# Patient Record
Sex: FEMALE | Race: WHITE | NOT HISPANIC OR LATINO
[De-identification: names, ages, dates, MRNs, and addresses within clinical notes are randomized per-mention and may not be internally consistent; named-entity substitution may affect disease eponyms.]

---

## 2017-05-22 ENCOUNTER — APPOINTMENT (OUTPATIENT)
Dept: SURGERY | Facility: CLINIC | Age: 82
End: 2017-05-22

## 2017-05-22 VITALS — HEIGHT: 64 IN | WEIGHT: 170 LBS | BODY MASS INDEX: 29.02 KG/M2

## 2017-05-22 RX ORDER — COLESEVELAM HYDROCHLORIDE 625 MG/1
625 TABLET, FILM COATED ORAL
Qty: 60 | Refills: 0 | Status: ACTIVE | COMMUNITY
Start: 2016-12-29

## 2017-06-20 ENCOUNTER — RECORD ABSTRACTING (OUTPATIENT)
Age: 82
End: 2017-06-20

## 2017-06-20 ENCOUNTER — APPOINTMENT (OUTPATIENT)
Dept: HEMATOLOGY ONCOLOGY | Facility: CLINIC | Age: 82
End: 2017-06-20

## 2017-06-20 VITALS — SYSTOLIC BLOOD PRESSURE: 144 MMHG | DIASTOLIC BLOOD PRESSURE: 70 MMHG | TEMPERATURE: 98 F | HEART RATE: 68 BPM

## 2017-06-21 ENCOUNTER — APPOINTMENT (OUTPATIENT)
Dept: SURGERY | Facility: CLINIC | Age: 82
End: 2017-06-21

## 2017-07-07 ENCOUNTER — APPOINTMENT (OUTPATIENT)
Dept: SURGERY | Facility: CLINIC | Age: 82
End: 2017-07-07

## 2017-08-08 ENCOUNTER — RX RENEWAL (OUTPATIENT)
Age: 82
End: 2017-08-08

## 2017-10-02 ENCOUNTER — APPOINTMENT (OUTPATIENT)
Dept: SURGERY | Facility: CLINIC | Age: 82
End: 2017-10-02
Payer: MEDICARE

## 2017-10-02 DIAGNOSIS — Z80.3 FAMILY HISTORY OF MALIGNANT NEOPLASM OF BREAST: ICD-10-CM

## 2017-10-02 PROCEDURE — 99214 OFFICE O/P EST MOD 30 MIN: CPT

## 2017-11-09 ENCOUNTER — RX RENEWAL (OUTPATIENT)
Age: 82
End: 2017-11-09

## 2017-12-12 ENCOUNTER — APPOINTMENT (OUTPATIENT)
Dept: HEMATOLOGY ONCOLOGY | Facility: CLINIC | Age: 82
End: 2017-12-12
Payer: MEDICARE

## 2017-12-12 VITALS
HEART RATE: 72 BPM | SYSTOLIC BLOOD PRESSURE: 154 MMHG | BODY MASS INDEX: 29.52 KG/M2 | WEIGHT: 172 LBS | DIASTOLIC BLOOD PRESSURE: 80 MMHG

## 2017-12-12 DIAGNOSIS — Z92.89 PERSONAL HISTORY OF OTHER MEDICAL TREATMENT: ICD-10-CM

## 2017-12-12 PROCEDURE — 99214 OFFICE O/P EST MOD 30 MIN: CPT

## 2018-03-18 ENCOUNTER — RX RENEWAL (OUTPATIENT)
Age: 83
End: 2018-03-18

## 2018-06-07 ENCOUNTER — RECORD ABSTRACTING (OUTPATIENT)
Age: 83
End: 2018-06-07

## 2018-06-07 DIAGNOSIS — Z92.241 PERSONAL HISTORY OF SYSTEMIC STEROID THERAPY: ICD-10-CM

## 2018-06-07 DIAGNOSIS — S90.00XA CONTUSION OF UNSPECIFIED ANKLE, INITIAL ENCOUNTER: ICD-10-CM

## 2018-06-07 DIAGNOSIS — Z83.3 FAMILY HISTORY OF DIABETES MELLITUS: ICD-10-CM

## 2018-06-11 ENCOUNTER — APPOINTMENT (OUTPATIENT)
Dept: ORTHOPEDIC SURGERY | Facility: CLINIC | Age: 83
End: 2018-06-11
Payer: MEDICARE

## 2018-06-11 VITALS — BODY MASS INDEX: 30.48 KG/M2 | WEIGHT: 172 LBS | HEIGHT: 63 IN

## 2018-06-11 PROCEDURE — 20610 DRAIN/INJ JOINT/BURSA W/O US: CPT | Mod: 50

## 2018-06-11 PROCEDURE — 99214 OFFICE O/P EST MOD 30 MIN: CPT | Mod: 25

## 2018-06-12 ENCOUNTER — APPOINTMENT (OUTPATIENT)
Dept: CT IMAGING | Facility: HOSPITAL | Age: 83
End: 2018-06-12
Payer: MEDICARE

## 2018-06-12 ENCOUNTER — OUTPATIENT (OUTPATIENT)
Dept: OUTPATIENT SERVICES | Facility: HOSPITAL | Age: 83
LOS: 1 days | End: 2018-06-12
Payer: MEDICARE

## 2018-06-12 DIAGNOSIS — Z00.8 ENCOUNTER FOR OTHER GENERAL EXAMINATION: ICD-10-CM

## 2018-06-12 PROCEDURE — 74176 CT ABD & PELVIS W/O CONTRAST: CPT

## 2018-06-12 PROCEDURE — 74176 CT ABD & PELVIS W/O CONTRAST: CPT | Mod: 26

## 2018-06-14 ENCOUNTER — APPOINTMENT (OUTPATIENT)
Dept: HEMATOLOGY ONCOLOGY | Facility: CLINIC | Age: 83
End: 2018-06-14
Payer: MEDICARE

## 2018-06-14 VITALS
HEART RATE: 64 BPM | WEIGHT: 162 LBS | TEMPERATURE: 99.4 F | SYSTOLIC BLOOD PRESSURE: 160 MMHG | DIASTOLIC BLOOD PRESSURE: 74 MMHG | BODY MASS INDEX: 28.7 KG/M2

## 2018-06-14 PROCEDURE — 99214 OFFICE O/P EST MOD 30 MIN: CPT

## 2018-06-14 RX ORDER — LATANOPROST/PF 0.005 %
0.01 DROPS OPHTHALMIC (EYE)
Qty: 25 | Refills: 0 | Status: ACTIVE | COMMUNITY
Start: 2017-12-29

## 2018-06-15 ENCOUNTER — RX RENEWAL (OUTPATIENT)
Age: 83
End: 2018-06-15

## 2018-06-18 DIAGNOSIS — N26.1 ATROPHY OF KIDNEY (TERMINAL): ICD-10-CM

## 2018-06-18 DIAGNOSIS — K80.20 CALCULUS OF GALLBLADDER WITHOUT CHOLECYSTITIS WITHOUT OBSTRUCTION: ICD-10-CM

## 2018-06-18 DIAGNOSIS — K57.30 DIVERTICULOSIS OF LARGE INTESTINE WITHOUT PERFORATION OR ABSCESS WITHOUT BLEEDING: ICD-10-CM

## 2018-06-18 DIAGNOSIS — N28.1 CYST OF KIDNEY, ACQUIRED: ICD-10-CM

## 2018-08-02 ENCOUNTER — APPOINTMENT (OUTPATIENT)
Dept: HEMATOLOGY ONCOLOGY | Facility: CLINIC | Age: 83
End: 2018-08-02
Payer: MEDICARE

## 2018-08-02 VITALS
DIASTOLIC BLOOD PRESSURE: 64 MMHG | SYSTOLIC BLOOD PRESSURE: 130 MMHG | WEIGHT: 160 LBS | TEMPERATURE: 99.1 F | RESPIRATION RATE: 16 BRPM | BODY MASS INDEX: 28.34 KG/M2 | HEART RATE: 72 BPM

## 2018-08-02 PROCEDURE — 99215 OFFICE O/P EST HI 40 MIN: CPT

## 2018-08-08 ENCOUNTER — APPOINTMENT (OUTPATIENT)
Dept: SURGERY | Facility: CLINIC | Age: 83
End: 2018-08-08
Payer: MEDICARE

## 2018-08-08 VITALS
HEIGHT: 63 IN | SYSTOLIC BLOOD PRESSURE: 162 MMHG | HEART RATE: 67 BPM | RESPIRATION RATE: 16 BRPM | BODY MASS INDEX: 29.23 KG/M2 | WEIGHT: 165 LBS | DIASTOLIC BLOOD PRESSURE: 78 MMHG | TEMPERATURE: 98.1 F | OXYGEN SATURATION: 98 %

## 2018-08-08 DIAGNOSIS — Z85.828 PERSONAL HISTORY OF OTHER MALIGNANT NEOPLASM OF SKIN: ICD-10-CM

## 2018-08-08 DIAGNOSIS — Z86.69 PERSONAL HISTORY OF OTHER DISEASES OF THE NERVOUS SYSTEM AND SENSE ORGANS: ICD-10-CM

## 2018-08-08 PROCEDURE — 99214 OFFICE O/P EST MOD 30 MIN: CPT

## 2018-08-08 RX ORDER — CLOTRIMAZOLE AND BETAMETHASONE DIPROPIONATE 10; .5 MG/G; MG/G
1-0.05 CREAM TOPICAL
Qty: 45 | Refills: 0 | Status: DISCONTINUED | COMMUNITY
Start: 2016-12-27 | End: 2018-08-08

## 2018-08-08 RX ORDER — ATORVASTATIN CALCIUM 80 MG/1
TABLET, FILM COATED ORAL
Refills: 0 | Status: DISCONTINUED | COMMUNITY
End: 2018-08-08

## 2018-08-08 RX ORDER — NYSTATIN 100000 U/G
100000 OINTMENT TOPICAL
Qty: 15 | Refills: 0 | Status: DISCONTINUED | COMMUNITY
Start: 2018-05-17 | End: 2018-08-08

## 2018-08-08 RX ORDER — TRIAMCINOLONE ACETONIDE 1 MG/G
0.1 OINTMENT TOPICAL
Qty: 15 | Refills: 0 | Status: DISCONTINUED | COMMUNITY
Start: 2018-05-17 | End: 2018-08-08

## 2018-08-08 RX ORDER — UBIDECARENONE/VIT E ACET 100MG-5
50 MCG CAPSULE ORAL
Refills: 0 | Status: DISCONTINUED | COMMUNITY
End: 2018-08-08

## 2018-08-08 RX ORDER — TRIAMCINOLONE ACETONIDE 0.25 MG/G
0.03 OINTMENT TOPICAL
Qty: 80 | Refills: 0 | Status: DISCONTINUED | COMMUNITY
Start: 2017-08-08 | End: 2018-08-08

## 2018-08-17 ENCOUNTER — RESULT REVIEW (OUTPATIENT)
Age: 83
End: 2018-08-17

## 2018-09-06 ENCOUNTER — OUTPATIENT (OUTPATIENT)
Dept: OUTPATIENT SERVICES | Facility: HOSPITAL | Age: 83
LOS: 1 days | End: 2018-09-06
Payer: MEDICARE

## 2018-09-06 VITALS
OXYGEN SATURATION: 98 % | SYSTOLIC BLOOD PRESSURE: 156 MMHG | DIASTOLIC BLOOD PRESSURE: 66 MMHG | RESPIRATION RATE: 18 BRPM | TEMPERATURE: 99 F | WEIGHT: 160.06 LBS | HEIGHT: 62 IN | HEART RATE: 63 BPM

## 2018-09-06 DIAGNOSIS — Z98.890 OTHER SPECIFIED POSTPROCEDURAL STATES: Chronic | ICD-10-CM

## 2018-09-06 DIAGNOSIS — C19 MALIGNANT NEOPLASM OF RECTOSIGMOID JUNCTION: ICD-10-CM

## 2018-09-06 DIAGNOSIS — Z90.89 ACQUIRED ABSENCE OF OTHER ORGANS: Chronic | ICD-10-CM

## 2018-09-06 DIAGNOSIS — Z01.818 ENCOUNTER FOR OTHER PREPROCEDURAL EXAMINATION: ICD-10-CM

## 2018-09-06 DIAGNOSIS — I82.409 ACUTE EMBOLISM AND THROMBOSIS OF UNSPECIFIED DEEP VEINS OF UNSPECIFIED LOWER EXTREMITY: ICD-10-CM

## 2018-09-06 DIAGNOSIS — Z98.49 CATARACT EXTRACTION STATUS, UNSPECIFIED EYE: Chronic | ICD-10-CM

## 2018-09-06 LAB
ANION GAP SERPL CALC-SCNC: 8 MMOL/L — SIGNIFICANT CHANGE UP (ref 5–17)
BLD GP AB SCN SERPL QL: NEGATIVE — SIGNIFICANT CHANGE UP
BUN SERPL-MCNC: 16 MG/DL — SIGNIFICANT CHANGE UP (ref 7–23)
CALCIUM SERPL-MCNC: 7.9 MG/DL — LOW (ref 8.4–10.5)
CHLORIDE SERPL-SCNC: 107 MMOL/L — SIGNIFICANT CHANGE UP (ref 96–108)
CO2 SERPL-SCNC: 23 MMOL/L — SIGNIFICANT CHANGE UP (ref 22–31)
CREAT SERPL-MCNC: 1.24 MG/DL — SIGNIFICANT CHANGE UP (ref 0.5–1.3)
GLUCOSE SERPL-MCNC: 141 MG/DL — HIGH (ref 70–99)
HCT VFR BLD CALC: 32.1 % — LOW (ref 34.5–45)
HGB BLD-MCNC: 9.8 G/DL — LOW (ref 11.5–15.5)
MCHC RBC-ENTMCNC: 25.3 PG — LOW (ref 27–34)
MCHC RBC-ENTMCNC: 30.5 GM/DL — LOW (ref 32–36)
MCV RBC AUTO: 82.9 FL — SIGNIFICANT CHANGE UP (ref 80–100)
PLATELET # BLD AUTO: 384 K/UL — SIGNIFICANT CHANGE UP (ref 150–400)
POTASSIUM SERPL-MCNC: 3.5 MMOL/L — SIGNIFICANT CHANGE UP (ref 3.5–5.3)
POTASSIUM SERPL-SCNC: 3.5 MMOL/L — SIGNIFICANT CHANGE UP (ref 3.5–5.3)
RBC # BLD: 3.87 M/UL — SIGNIFICANT CHANGE UP (ref 3.8–5.2)
RBC # FLD: 16 % — HIGH (ref 10.3–14.5)
RH IG SCN BLD-IMP: POSITIVE — SIGNIFICANT CHANGE UP
SODIUM SERPL-SCNC: 138 MMOL/L — SIGNIFICANT CHANGE UP (ref 135–145)
WBC # BLD: 7.08 K/UL — SIGNIFICANT CHANGE UP (ref 3.8–10.5)
WBC # FLD AUTO: 7.08 K/UL — SIGNIFICANT CHANGE UP (ref 3.8–10.5)

## 2018-09-06 PROCEDURE — 85027 COMPLETE CBC AUTOMATED: CPT

## 2018-09-06 PROCEDURE — 80048 BASIC METABOLIC PNL TOTAL CA: CPT

## 2018-09-06 PROCEDURE — 86850 RBC ANTIBODY SCREEN: CPT

## 2018-09-06 PROCEDURE — 86901 BLOOD TYPING SEROLOGIC RH(D): CPT

## 2018-09-06 PROCEDURE — 86900 BLOOD TYPING SEROLOGIC ABO: CPT

## 2018-09-06 PROCEDURE — G0463: CPT

## 2018-09-06 NOTE — H&P PST ADULT - NSANTHOSAYNRD_GEN_A_CORE
No. HODAN screening performed.  STOP BANG Legend: 0-2 = LOW Risk; 3-4 = INTERMEDIATE Risk; 5-8 = HIGH Risk

## 2018-09-06 NOTE — H&P PST ADULT - PMH
<<-----Click on this checkbox to enter Past Medical History Basal cell carcinoma    Breast CA    DVT (deep venous thrombosis)  left leg  HLD (hyperlipidemia)    HTN (hypertension)    Macular degeneration

## 2018-09-06 NOTE — H&P PST ADULT - LAST ECHOCARDIOGRAM
going for pre-surgical evaluation next week going for pre-surgical evaluation next week with cardiologist

## 2018-09-06 NOTE — H&P PST ADULT - HISTORY OF PRESENT ILLNESS
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  Presents today for ERP, laparoscopic sigmoid colectomy.

## 2018-09-11 ENCOUNTER — APPOINTMENT (OUTPATIENT)
Dept: ORTHOPEDIC SURGERY | Facility: CLINIC | Age: 83
End: 2018-09-11
Payer: MEDICARE

## 2018-09-11 PROBLEM — I82.409 ACUTE EMBOLISM AND THROMBOSIS OF UNSPECIFIED DEEP VEINS OF UNSPECIFIED LOWER EXTREMITY: Chronic | Status: ACTIVE | Noted: 2018-09-06

## 2018-09-11 PROBLEM — H35.30 UNSPECIFIED MACULAR DEGENERATION: Chronic | Status: ACTIVE | Noted: 2018-09-06

## 2018-09-11 PROBLEM — I10 ESSENTIAL (PRIMARY) HYPERTENSION: Chronic | Status: ACTIVE | Noted: 2018-09-06

## 2018-09-11 PROBLEM — E78.5 HYPERLIPIDEMIA, UNSPECIFIED: Chronic | Status: ACTIVE | Noted: 2018-09-06

## 2018-09-11 PROBLEM — C50.919 MALIGNANT NEOPLASM OF UNSPECIFIED SITE OF UNSPECIFIED FEMALE BREAST: Chronic | Status: ACTIVE | Noted: 2018-09-06

## 2018-09-11 PROBLEM — C44.91 BASAL CELL CARCINOMA OF SKIN, UNSPECIFIED: Chronic | Status: ACTIVE | Noted: 2018-09-06

## 2018-09-11 PROCEDURE — 20610 DRAIN/INJ JOINT/BURSA W/O US: CPT | Mod: 50

## 2018-09-11 PROCEDURE — 99214 OFFICE O/P EST MOD 30 MIN: CPT | Mod: 25

## 2018-09-13 ENCOUNTER — NON-APPOINTMENT (OUTPATIENT)
Age: 83
End: 2018-09-13

## 2018-09-13 ENCOUNTER — APPOINTMENT (OUTPATIENT)
Dept: CARDIOLOGY | Facility: CLINIC | Age: 83
End: 2018-09-13
Payer: MEDICARE

## 2018-09-13 ENCOUNTER — APPOINTMENT (OUTPATIENT)
Dept: CARDIOLOGY | Facility: CLINIC | Age: 83
End: 2018-09-13

## 2018-09-13 VITALS
HEART RATE: 62 BPM | DIASTOLIC BLOOD PRESSURE: 61 MMHG | OXYGEN SATURATION: 97 % | WEIGHT: 165 LBS | SYSTOLIC BLOOD PRESSURE: 169 MMHG | BODY MASS INDEX: 29.23 KG/M2 | HEIGHT: 63 IN

## 2018-09-13 VITALS — HEART RATE: 60 BPM | DIASTOLIC BLOOD PRESSURE: 60 MMHG | SYSTOLIC BLOOD PRESSURE: 130 MMHG

## 2018-09-13 DIAGNOSIS — E78.00 PURE HYPERCHOLESTEROLEMIA, UNSPECIFIED: ICD-10-CM

## 2018-09-13 DIAGNOSIS — K62.5 HEMORRHAGE OF ANUS AND RECTUM: ICD-10-CM

## 2018-09-13 DIAGNOSIS — I82.402 ACUTE EMBOLISM AND THROMBOSIS OF UNSPECIFIED DEEP VEINS OF LEFT LOWER EXTREMITY: ICD-10-CM

## 2018-09-13 DIAGNOSIS — I10 ESSENTIAL (PRIMARY) HYPERTENSION: ICD-10-CM

## 2018-09-13 PROCEDURE — 93000 ELECTROCARDIOGRAM COMPLETE: CPT

## 2018-09-13 PROCEDURE — 93306 TTE W/DOPPLER COMPLETE: CPT

## 2018-09-13 PROCEDURE — 99205 OFFICE O/P NEW HI 60 MIN: CPT

## 2018-09-14 PROBLEM — I35.0 AORTIC STENOSIS: Status: ACTIVE | Noted: 2018-09-14

## 2018-09-19 ENCOUNTER — APPOINTMENT (OUTPATIENT)
Dept: SURGERY | Facility: HOSPITAL | Age: 83
End: 2018-09-19
Payer: MEDICARE

## 2018-09-19 ENCOUNTER — RESULT REVIEW (OUTPATIENT)
Age: 83
End: 2018-09-19

## 2018-09-19 ENCOUNTER — TRANSCRIPTION ENCOUNTER (OUTPATIENT)
Age: 83
End: 2018-09-19

## 2018-09-19 ENCOUNTER — INPATIENT (INPATIENT)
Facility: HOSPITAL | Age: 83
LOS: 4 days | Discharge: NURSING, REHAB OR LONG-TERM CARE | DRG: 331 | End: 2018-09-24
Attending: COLON & RECTAL SURGERY | Admitting: COLON & RECTAL SURGERY
Payer: MEDICARE

## 2018-09-19 VITALS
HEART RATE: 60 BPM | OXYGEN SATURATION: 100 % | RESPIRATION RATE: 18 BRPM | TEMPERATURE: 98 F | DIASTOLIC BLOOD PRESSURE: 74 MMHG | SYSTOLIC BLOOD PRESSURE: 157 MMHG

## 2018-09-19 DIAGNOSIS — C19 MALIGNANT NEOPLASM OF RECTOSIGMOID JUNCTION: ICD-10-CM

## 2018-09-19 DIAGNOSIS — Z98.49 CATARACT EXTRACTION STATUS, UNSPECIFIED EYE: Chronic | ICD-10-CM

## 2018-09-19 DIAGNOSIS — Z98.890 OTHER SPECIFIED POSTPROCEDURAL STATES: Chronic | ICD-10-CM

## 2018-09-19 DIAGNOSIS — Z90.89 ACQUIRED ABSENCE OF OTHER ORGANS: Chronic | ICD-10-CM

## 2018-09-19 LAB
ANION GAP SERPL CALC-SCNC: 10 MMOL/L — SIGNIFICANT CHANGE UP (ref 5–17)
ANION GAP SERPL CALC-SCNC: 9 MMOL/L — SIGNIFICANT CHANGE UP (ref 5–17)
APTT BLD: 23.5 SEC — LOW (ref 27.5–37.4)
APTT BLD: 23.6 SEC — LOW (ref 27.5–37.4)
BUN SERPL-MCNC: 20 MG/DL — SIGNIFICANT CHANGE UP (ref 7–23)
BUN SERPL-MCNC: 21 MG/DL — SIGNIFICANT CHANGE UP (ref 7–23)
CALCIUM SERPL-MCNC: 9.4 MG/DL — SIGNIFICANT CHANGE UP (ref 8.4–10.5)
CALCIUM SERPL-MCNC: 9.7 MG/DL — SIGNIFICANT CHANGE UP (ref 8.4–10.5)
CHLORIDE SERPL-SCNC: 97 MMOL/L — SIGNIFICANT CHANGE UP (ref 96–108)
CHLORIDE SERPL-SCNC: 98 MMOL/L — SIGNIFICANT CHANGE UP (ref 96–108)
CO2 SERPL-SCNC: 33 MMOL/L — HIGH (ref 22–31)
CO2 SERPL-SCNC: 33 MMOL/L — HIGH (ref 22–31)
CREAT SERPL-MCNC: 1.26 MG/DL — SIGNIFICANT CHANGE UP (ref 0.5–1.3)
CREAT SERPL-MCNC: 1.26 MG/DL — SIGNIFICANT CHANGE UP (ref 0.5–1.3)
GLUCOSE SERPL-MCNC: 198 MG/DL — HIGH (ref 70–99)
GLUCOSE SERPL-MCNC: 202 MG/DL — HIGH (ref 70–99)
HCT VFR BLD CALC: 31.7 % — LOW (ref 34.5–45)
HGB BLD-MCNC: 10.2 G/DL — LOW (ref 11.5–15.5)
INR BLD: 1.38 RATIO — HIGH (ref 0.88–1.16)
INR BLD: 1.39 RATIO — HIGH (ref 0.88–1.16)
MAGNESIUM SERPL-MCNC: 2.7 MG/DL — HIGH (ref 1.6–2.6)
MAGNESIUM SERPL-MCNC: 2.8 MG/DL — HIGH (ref 1.6–2.6)
MCHC RBC-ENTMCNC: 26 PG — LOW (ref 27–34)
MCHC RBC-ENTMCNC: 32.1 GM/DL — SIGNIFICANT CHANGE UP (ref 32–36)
MCV RBC AUTO: 81.2 FL — SIGNIFICANT CHANGE UP (ref 80–100)
PHOSPHATE SERPL-MCNC: 2.1 MG/DL — LOW (ref 2.5–4.5)
PHOSPHATE SERPL-MCNC: 2.1 MG/DL — LOW (ref 2.5–4.5)
PLATELET # BLD AUTO: 398 K/UL — SIGNIFICANT CHANGE UP (ref 150–400)
POTASSIUM SERPL-MCNC: 4.2 MMOL/L — SIGNIFICANT CHANGE UP (ref 3.5–5.3)
POTASSIUM SERPL-MCNC: 4.3 MMOL/L — SIGNIFICANT CHANGE UP (ref 3.5–5.3)
POTASSIUM SERPL-SCNC: 4.2 MMOL/L — SIGNIFICANT CHANGE UP (ref 3.5–5.3)
POTASSIUM SERPL-SCNC: 4.3 MMOL/L — SIGNIFICANT CHANGE UP (ref 3.5–5.3)
PROTHROM AB SERPL-ACNC: 15 SEC — HIGH (ref 9.8–12.7)
PROTHROM AB SERPL-ACNC: 15.1 SEC — HIGH (ref 9.8–12.7)
RBC # BLD: 3.9 M/UL — SIGNIFICANT CHANGE UP (ref 3.8–5.2)
RBC # FLD: 14.4 % — SIGNIFICANT CHANGE UP (ref 10.3–14.5)
RH IG SCN BLD-IMP: POSITIVE — SIGNIFICANT CHANGE UP
SODIUM SERPL-SCNC: 140 MMOL/L — SIGNIFICANT CHANGE UP (ref 135–145)
SODIUM SERPL-SCNC: 140 MMOL/L — SIGNIFICANT CHANGE UP (ref 135–145)
WBC # BLD: 15.1 K/UL — HIGH (ref 3.8–10.5)
WBC # FLD AUTO: 15.1 K/UL — HIGH (ref 3.8–10.5)

## 2018-09-19 PROCEDURE — 45378 DIAGNOSTIC COLONOSCOPY: CPT

## 2018-09-19 PROCEDURE — 88305 TISSUE EXAM BY PATHOLOGIST: CPT | Mod: 26

## 2018-09-19 RX ORDER — CIPROFLOXACIN LACTATE 400MG/40ML
250 VIAL (ML) INTRAVENOUS ONCE
Qty: 0 | Refills: 0 | Status: COMPLETED | OUTPATIENT
Start: 2018-09-19 | End: 2018-09-19

## 2018-09-19 RX ORDER — METRONIDAZOLE 500 MG
250 TABLET ORAL ONCE
Qty: 0 | Refills: 0 | Status: COMPLETED | OUTPATIENT
Start: 2018-09-19 | End: 2018-09-19

## 2018-09-19 RX ORDER — INFLUENZA VIRUS VACCINE 15; 15; 15; 15 UG/.5ML; UG/.5ML; UG/.5ML; UG/.5ML
0.5 SUSPENSION INTRAMUSCULAR ONCE
Qty: 0 | Refills: 0 | Status: DISCONTINUED | OUTPATIENT
Start: 2018-09-19 | End: 2018-09-24

## 2018-09-19 RX ORDER — HEPARIN SODIUM 5000 [USP'U]/ML
5000 INJECTION INTRAVENOUS; SUBCUTANEOUS EVERY 8 HOURS
Qty: 0 | Refills: 0 | Status: DISCONTINUED | OUTPATIENT
Start: 2018-09-19 | End: 2018-09-20

## 2018-09-19 RX ORDER — ATENOLOL 25 MG/1
50 TABLET ORAL DAILY
Qty: 0 | Refills: 0 | Status: DISCONTINUED | OUTPATIENT
Start: 2018-09-19 | End: 2018-09-20

## 2018-09-19 RX ORDER — LATANOPROST 0.05 MG/ML
1 SOLUTION/ DROPS OPHTHALMIC; TOPICAL AT BEDTIME
Qty: 0 | Refills: 0 | Status: DISCONTINUED | OUTPATIENT
Start: 2018-09-19 | End: 2018-09-20

## 2018-09-19 RX ORDER — EXEMESTANE 25 MG/1
25 TABLET, SUGAR COATED ORAL DAILY
Qty: 0 | Refills: 0 | Status: DISCONTINUED | OUTPATIENT
Start: 2018-09-19 | End: 2018-09-20

## 2018-09-19 RX ORDER — COLESEVELAM HYDROCHLORIDE 625 MG/1
1875 TABLET, FILM COATED ORAL
Qty: 0 | Refills: 0 | Status: DISCONTINUED | OUTPATIENT
Start: 2018-09-19 | End: 2018-09-20

## 2018-09-19 RX ADMIN — HEPARIN SODIUM 5000 UNIT(S): 5000 INJECTION INTRAVENOUS; SUBCUTANEOUS at 22:55

## 2018-09-19 RX ADMIN — EXEMESTANE 25 MILLIGRAM(S): 25 TABLET, SUGAR COATED ORAL at 11:40

## 2018-09-19 RX ADMIN — Medication 250 MILLIGRAM(S): at 11:40

## 2018-09-19 RX ADMIN — COLESEVELAM HYDROCHLORIDE 1875 MILLIGRAM(S): 625 TABLET, FILM COATED ORAL at 17:33

## 2018-09-19 RX ADMIN — Medication 250 MILLIGRAM(S): at 16:17

## 2018-09-19 RX ADMIN — Medication 250 MILLIGRAM(S): at 23:34

## 2018-09-19 RX ADMIN — HEPARIN SODIUM 5000 UNIT(S): 5000 INJECTION INTRAVENOUS; SUBCUTANEOUS at 14:50

## 2018-09-19 RX ADMIN — LATANOPROST 1 DROP(S): 0.05 SOLUTION/ DROPS OPHTHALMIC; TOPICAL at 22:55

## 2018-09-20 ENCOUNTER — RESULT REVIEW (OUTPATIENT)
Age: 83
End: 2018-09-20

## 2018-09-20 ENCOUNTER — APPOINTMENT (OUTPATIENT)
Dept: SURGERY | Facility: HOSPITAL | Age: 83
End: 2018-09-20
Payer: MEDICARE

## 2018-09-20 DIAGNOSIS — I35.0 NONRHEUMATIC AORTIC (VALVE) STENOSIS: ICD-10-CM

## 2018-09-20 LAB
ANION GAP SERPL CALC-SCNC: 13 MMOL/L — SIGNIFICANT CHANGE UP (ref 5–17)
APTT BLD: 24.8 SEC — LOW (ref 27.5–37.4)
BUN SERPL-MCNC: 16 MG/DL — SIGNIFICANT CHANGE UP (ref 7–23)
CALCIUM SERPL-MCNC: 8.6 MG/DL — SIGNIFICANT CHANGE UP (ref 8.4–10.5)
CHLORIDE SERPL-SCNC: 99 MMOL/L — SIGNIFICANT CHANGE UP (ref 96–108)
CO2 SERPL-SCNC: 25 MMOL/L — SIGNIFICANT CHANGE UP (ref 22–31)
CREAT SERPL-MCNC: 1.14 MG/DL — SIGNIFICANT CHANGE UP (ref 0.5–1.3)
GLUCOSE SERPL-MCNC: 189 MG/DL — HIGH (ref 70–99)
HCT VFR BLD CALC: 31.8 % — LOW (ref 34.5–45)
HCT VFR BLD CALC: 32.7 % — LOW (ref 34.5–45)
HGB BLD-MCNC: 9.8 G/DL — LOW (ref 11.5–15.5)
HGB BLD-MCNC: 9.9 G/DL — LOW (ref 11.5–15.5)
INR BLD: 1.36 RATIO — HIGH (ref 0.88–1.16)
MCHC RBC-ENTMCNC: 24.7 PG — LOW (ref 27–34)
MCHC RBC-ENTMCNC: 25.1 PG — LOW (ref 27–34)
MCHC RBC-ENTMCNC: 30.3 GM/DL — LOW (ref 32–36)
MCHC RBC-ENTMCNC: 30.8 GM/DL — LOW (ref 32–36)
MCV RBC AUTO: 81.5 FL — SIGNIFICANT CHANGE UP (ref 80–100)
MCV RBC AUTO: 81.6 FL — SIGNIFICANT CHANGE UP (ref 80–100)
PLATELET # BLD AUTO: 383 K/UL — SIGNIFICANT CHANGE UP (ref 150–400)
PLATELET # BLD AUTO: 393 K/UL — SIGNIFICANT CHANGE UP (ref 150–400)
POTASSIUM SERPL-MCNC: 3.6 MMOL/L — SIGNIFICANT CHANGE UP (ref 3.5–5.3)
POTASSIUM SERPL-SCNC: 3.6 MMOL/L — SIGNIFICANT CHANGE UP (ref 3.5–5.3)
PROTHROM AB SERPL-ACNC: 14.8 SEC — HIGH (ref 9.8–12.7)
RBC # BLD: 3.9 M/UL — SIGNIFICANT CHANGE UP (ref 3.8–5.2)
RBC # BLD: 4.01 M/UL — SIGNIFICANT CHANGE UP (ref 3.8–5.2)
RBC # FLD: 14.1 % — SIGNIFICANT CHANGE UP (ref 10.3–14.5)
RBC # FLD: 14.1 % — SIGNIFICANT CHANGE UP (ref 10.3–14.5)
SODIUM SERPL-SCNC: 137 MMOL/L — SIGNIFICANT CHANGE UP (ref 135–145)
SURGICAL PATHOLOGY STUDY: SIGNIFICANT CHANGE UP
WBC # BLD: 10.1 K/UL — SIGNIFICANT CHANGE UP (ref 3.8–10.5)
WBC # BLD: 16.9 K/UL — HIGH (ref 3.8–10.5)
WBC # FLD AUTO: 10.1 K/UL — SIGNIFICANT CHANGE UP (ref 3.8–10.5)
WBC # FLD AUTO: 16.9 K/UL — HIGH (ref 3.8–10.5)

## 2018-09-20 PROCEDURE — 88309 TISSUE EXAM BY PATHOLOGIST: CPT | Mod: 26

## 2018-09-20 PROCEDURE — 88341 IMHCHEM/IMCYTCHM EA ADD ANTB: CPT | Mod: 26

## 2018-09-20 PROCEDURE — 44213 LAP MOBIL SPLENIC FL ADD-ON: CPT

## 2018-09-20 PROCEDURE — 44207 L COLECTOMY/COLOPROCTOSTOMY: CPT

## 2018-09-20 PROCEDURE — 88305 TISSUE EXAM BY PATHOLOGIST: CPT | Mod: 26

## 2018-09-20 PROCEDURE — 88342 IMHCHEM/IMCYTCHM 1ST ANTB: CPT | Mod: 26

## 2018-09-20 RX ORDER — ACETAMINOPHEN 500 MG
1000 TABLET ORAL ONCE
Qty: 0 | Refills: 0 | Status: COMPLETED | OUTPATIENT
Start: 2018-09-20 | End: 2018-09-20

## 2018-09-20 RX ORDER — ACETAMINOPHEN 500 MG
1000 TABLET ORAL ONCE
Qty: 0 | Refills: 0 | Status: COMPLETED | OUTPATIENT
Start: 2018-09-21 | End: 2018-09-21

## 2018-09-20 RX ORDER — HYDROMORPHONE HYDROCHLORIDE 2 MG/ML
0.1 INJECTION INTRAMUSCULAR; INTRAVENOUS; SUBCUTANEOUS
Qty: 0 | Refills: 0 | Status: DISCONTINUED | OUTPATIENT
Start: 2018-09-20 | End: 2018-09-20

## 2018-09-20 RX ORDER — HEPARIN SODIUM 5000 [USP'U]/ML
5000 INJECTION INTRAVENOUS; SUBCUTANEOUS EVERY 8 HOURS
Qty: 0 | Refills: 0 | Status: DISCONTINUED | OUTPATIENT
Start: 2018-09-20 | End: 2018-09-24

## 2018-09-20 RX ORDER — DEXTROSE MONOHYDRATE, SODIUM CHLORIDE, AND POTASSIUM CHLORIDE 50; .745; 4.5 G/1000ML; G/1000ML; G/1000ML
1000 INJECTION, SOLUTION INTRAVENOUS
Qty: 0 | Refills: 0 | Status: DISCONTINUED | OUTPATIENT
Start: 2018-09-20 | End: 2018-09-20

## 2018-09-20 RX ORDER — METOPROLOL TARTRATE 50 MG
2.5 TABLET ORAL EVERY 6 HOURS
Qty: 0 | Refills: 0 | Status: DISCONTINUED | OUTPATIENT
Start: 2018-09-20 | End: 2018-09-22

## 2018-09-20 RX ORDER — SODIUM CHLORIDE 9 MG/ML
1000 INJECTION, SOLUTION INTRAVENOUS
Qty: 0 | Refills: 0 | Status: DISCONTINUED | OUTPATIENT
Start: 2018-09-20 | End: 2018-09-21

## 2018-09-20 RX ORDER — HYDROMORPHONE HYDROCHLORIDE 2 MG/ML
0.2 INJECTION INTRAMUSCULAR; INTRAVENOUS; SUBCUTANEOUS
Qty: 0 | Refills: 0 | Status: DISCONTINUED | OUTPATIENT
Start: 2018-09-20 | End: 2018-09-20

## 2018-09-20 RX ORDER — ONDANSETRON 8 MG/1
4 TABLET, FILM COATED ORAL ONCE
Qty: 0 | Refills: 0 | Status: DISCONTINUED | OUTPATIENT
Start: 2018-09-20 | End: 2018-09-20

## 2018-09-20 RX ORDER — SODIUM CHLORIDE 9 MG/ML
1000 INJECTION, SOLUTION INTRAVENOUS
Qty: 0 | Refills: 0 | Status: DISCONTINUED | OUTPATIENT
Start: 2018-09-20 | End: 2018-09-20

## 2018-09-20 RX ADMIN — HYDROMORPHONE HYDROCHLORIDE 0.2 MILLIGRAM(S): 2 INJECTION INTRAMUSCULAR; INTRAVENOUS; SUBCUTANEOUS at 13:44

## 2018-09-20 RX ADMIN — HYDROMORPHONE HYDROCHLORIDE 0.2 MILLIGRAM(S): 2 INJECTION INTRAMUSCULAR; INTRAVENOUS; SUBCUTANEOUS at 14:00

## 2018-09-20 RX ADMIN — Medication 1000 MILLIGRAM(S): at 23:57

## 2018-09-20 RX ADMIN — COLESEVELAM HYDROCHLORIDE 1875 MILLIGRAM(S): 625 TABLET, FILM COATED ORAL at 05:20

## 2018-09-20 RX ADMIN — HEPARIN SODIUM 5000 UNIT(S): 5000 INJECTION INTRAVENOUS; SUBCUTANEOUS at 21:34

## 2018-09-20 RX ADMIN — HEPARIN SODIUM 5000 UNIT(S): 5000 INJECTION INTRAVENOUS; SUBCUTANEOUS at 18:29

## 2018-09-20 RX ADMIN — HYDROMORPHONE HYDROCHLORIDE 0.2 MILLIGRAM(S): 2 INJECTION INTRAMUSCULAR; INTRAVENOUS; SUBCUTANEOUS at 11:45

## 2018-09-20 RX ADMIN — Medication 400 MILLIGRAM(S): at 18:29

## 2018-09-20 RX ADMIN — Medication 400 MILLIGRAM(S): at 23:38

## 2018-09-20 RX ADMIN — SODIUM CHLORIDE 40 MILLILITER(S): 9 INJECTION, SOLUTION INTRAVENOUS at 11:54

## 2018-09-20 RX ADMIN — HYDROMORPHONE HYDROCHLORIDE 0.2 MILLIGRAM(S): 2 INJECTION INTRAMUSCULAR; INTRAVENOUS; SUBCUTANEOUS at 11:35

## 2018-09-20 NOTE — CONSULT NOTE ADULT - SUBJECTIVE AND OBJECTIVE BOX
HPI: 89 f s/p colon mass resection.       PAST MEDICAL & SURGICAL HISTORY:  Basal cell carcinoma  Breast CA  HLD (hyperlipidemia)  Macular degeneration  DVT (deep venous thrombosis): left leg  HTN (hypertension)  S/P tonsillectomy  S/P cataract extraction  S/P lumpectomy of breast      Review of Systems:   limited , s/p OR.  denies CP, SOB, palpitaions.  + incisional discomfort    Allergies    No Known Allergies    Intolerances        Social History:     FAMILY HISTORY:      MEDICATIONS  (STANDING):  acetaminophen  IVPB .. 1000 milliGRAM(s) IV Intermittent once  acetaminophen  IVPB .. 1000 milliGRAM(s) IV Intermittent once  heparin  Injectable 5000 Unit(s) SubCutaneous every 8 hours  influenza   Vaccine 0.5 milliLiter(s) IntraMuscular once  lactated ringers. 1000 milliLiter(s) (40 mL/Hr) IV Continuous <Continuous>    MEDICATIONS  (PRN):        CAPILLARY BLOOD GLUCOSE        I&O's Summary    19 Sep 2018 07:01  -  20 Sep 2018 07:00  --------------------------------------------------------  IN: 375 mL / OUT: 1 mL / NET: 374 mL    20 Sep 2018 07:01  -  20 Sep 2018 16:41  --------------------------------------------------------  IN: 120 mL / OUT: 300 mL / NET: -180 mL        PHYSICAL EXAM:  GENERAL: NAD  HEAD:  Atraumatic, Normocephalic  EYES: EOMI, PERRLA, conjunctiva and sclera clear  NECK: Supple, No JVD  CHEST/LUNG: Clear to auscultation bilaterally; No wheeze  HEART: Regular rate and rhythm; No murmurs, rubs, or gallops  ABDOMEN: Soft, Nontender, Nondistended; Dressing clean  EXTREMITIES:  2+ Peripheral Pulses, No clubbing, cyanosis, or edema  PSYCH: AAOx3  NEUROLOGY: non-focal  SKIN: No rashes or lesions    LABS:                        9.8    16.9  )-----------( 393      ( 20 Sep 2018 12:08 )             31.8     09-20    137  |  99  |  16  ----------------------------<  189<H>  3.6   |  25  |  1.14    Ca    8.6      20 Sep 2018 12:08  Phos  2.1     09-19  Mg     2.8     09-19      PT/INR - ( 20 Sep 2018 06:47 )   PT: 14.8 sec;   INR: 1.36 ratio         PTT - ( 20 Sep 2018 06:47 )  PTT:24.8 sec          RADIOLOGY & ADDITIONAL TESTS:    Imaging Personally Reviewed:    Consultant(s) Notes Reviewed:      Care Discussed with Consultants/Other Providers:

## 2018-09-20 NOTE — CONSULT NOTE ADULT - ASSESSMENT
89 f s/p colon mass resection  pain control  await return of GI fx  low dose BB   DVT-restart AC with Coumadin when surgically stable.  d/w daughter at bedside  Further action as per clinical course   Julio Whitmore MD pager [phone]

## 2018-09-20 NOTE — BRIEF OPERATIVE NOTE - PROCEDURE
<<-----Click on this checkbox to enter Procedure Laparoscopic sigmoid colectomy  09/20/2018    Active  AGAINES

## 2018-09-20 NOTE — PROVIDER CONTACT NOTE (OTHER) - SITUATION
Pt NPO at midnight. Ordered for 3 10oz Ensures overnight between 8 hours and 2 hours prior to surgery at 730am.

## 2018-09-20 NOTE — CONSULT NOTE ADULT - ASSESSMENT
89 y.o. female who had a recent work-up for rectal bleeding.  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  Patient is now s/p surgery for colo-rectal cancer.   Pain is being controlled.  No melena.  No rectal bleeding.    Plan:  1.  Appreciate excellent surgical care.  2.  Post-op diet per surgery.  3.  Await surgical path.

## 2018-09-20 NOTE — CONSULT NOTE ADULT - SUBJECTIVE AND OBJECTIVE BOX
HPI:  89 y.o. female who had a recent work-up for rectal bleeding.  I did a flexible sigmoidoscopy that showed an adenocarcinoma of the recto-sigmoid.  Patient is now s/p surgery for colo-rectal cancer.   Pain is being controlled.  No melena.  No rectal bleeding.      PAST MEDICAL & SURGICAL HISTORY:  Basal cell carcinoma  Breast CA  HLD (hyperlipidemia)  Macular degeneration  DVT (deep venous thrombosis): left leg  HTN (hypertension)  S/P tonsillectomy  S/P cataract extraction  S/P lumpectomy of breast      Allergies    No Known Allergies    Intolerances        MEDICATIONS  (STANDING):  acetaminophen  IVPB .. 1000 milliGRAM(s) IV Intermittent once  heparin  Injectable 5000 Unit(s) SubCutaneous every 8 hours  influenza   Vaccine 0.5 milliLiter(s) IntraMuscular once  lactated ringers. 1000 milliLiter(s) (40 mL/Hr) IV Continuous <Continuous>  metoprolol tartrate Injectable 2.5 milliGRAM(s) IV Push every 6 hours    MEDICATIONS  (PRN):          Review of Systems:    General:  No wt loss, fevers, chills, night sweats,fatigue,   CV:  No pain, palpitatioins, hypo/hypertension  Resp:  No dyspnea, cough, tachypnea, wheezing  GI:  No pain, No nausea, No vomiting, No diarrhea, No constipatiion, No weight loss, No fever, No pruritis, No rectal bleeding, No tarry stools, No dysphagia,  :  No pain, bleeding, incontinence, nocturia  Muscle:  No pain, weakness  Neuro:  No weakness, tingling, memory problems  Psych:  No fatigue, insomnia, mood problems, depression  Endocrine:  No polyuria, polydypsia, cold/heat intolerance  Heme:  No petechiae, ecchymosis, easy bruisability  Skin:  No rash, tattoos, scars, edema  Otherwise 10 point ROS negative      Vital Signs Last 24 Hrs  T(C): 36.7 (20 Sep 2018 19:38), Max: 37.2 (20 Sep 2018 01:37)  T(F): 98 (20 Sep 2018 19:38), Max: 98.9 (20 Sep 2018 01:37)  HR: 64 (20 Sep 2018 19:38) (54 - 65)  BP: 122/58 (20 Sep 2018 19:38) (109/61 - 162/67)  BP(mean): 111 (20 Sep 2018 14:30) (80 - 122)  RR: 18 (20 Sep 2018 19:38) (15 - 18)  SpO2: 98% (20 Sep 2018 19:38) (96% - 100%)    PHYSICAL EXAM:    Constitutional: NAD, well-developed  Neck: No LAD, supple  Respiratory: CTA and P  Cardiovascular: S1 and S2, RRR, no M  Gastrointestinal: BS+, soft, NT/ND, neg HSM,  Extremities: No peripheral edema, neg clubing, cyanosis  Vascular: 2+ peripheral pulses  Neurological: A/O x 3, no focal deficits  Psychiatric: Normal mood, normal affect  Skin: No rashes        LABS:                        9.8    16.9  )-----------( 393      ( 20 Sep 2018 12:08 )             31.8     09-20    137  |  99  |  16  ----------------------------<  189<H>  3.6   |  25  |  1.14    Ca    8.6      20 Sep 2018 12:08  Phos  2.1     09-19  Mg     2.8     09-19      PT/INR - ( 20 Sep 2018 06:47 )   PT: 14.8 sec;   INR: 1.36 ratio         PTT - ( 20 Sep 2018 06:47 )  PTT:24.8 sec        RADIOLOGY & ADDITIONAL TESTS:

## 2018-09-21 LAB
ANION GAP SERPL CALC-SCNC: 9 MMOL/L — SIGNIFICANT CHANGE UP (ref 5–17)
BUN SERPL-MCNC: 10 MG/DL — SIGNIFICANT CHANGE UP (ref 7–23)
CALCIUM SERPL-MCNC: 8 MG/DL — LOW (ref 8.4–10.5)
CHLORIDE SERPL-SCNC: 95 MMOL/L — LOW (ref 96–108)
CO2 SERPL-SCNC: 24 MMOL/L — SIGNIFICANT CHANGE UP (ref 22–31)
CREAT SERPL-MCNC: 1.01 MG/DL — SIGNIFICANT CHANGE UP (ref 0.5–1.3)
GLUCOSE SERPL-MCNC: 104 MG/DL — HIGH (ref 70–99)
HCT VFR BLD CALC: 24.7 % — LOW (ref 34.5–45)
HCT VFR BLD CALC: 29.2 % — LOW (ref 34.5–45)
HGB BLD-MCNC: 10.2 G/DL — LOW (ref 11.5–15.5)
HGB BLD-MCNC: 7.3 G/DL — LOW (ref 11.5–15.5)
MAGNESIUM SERPL-MCNC: 2.2 MG/DL — SIGNIFICANT CHANGE UP (ref 1.6–2.6)
MCHC RBC-ENTMCNC: 24.3 PG — LOW (ref 27–34)
MCHC RBC-ENTMCNC: 28.7 PG — SIGNIFICANT CHANGE UP (ref 27–34)
MCHC RBC-ENTMCNC: 29.6 GM/DL — LOW (ref 32–36)
MCHC RBC-ENTMCNC: 34.8 GM/DL — SIGNIFICANT CHANGE UP (ref 32–36)
MCV RBC AUTO: 82.3 FL — SIGNIFICANT CHANGE UP (ref 80–100)
MCV RBC AUTO: 82.4 FL — SIGNIFICANT CHANGE UP (ref 80–100)
PHOSPHATE SERPL-MCNC: 2.1 MG/DL — LOW (ref 2.5–4.5)
PLATELET # BLD AUTO: 322 K/UL — SIGNIFICANT CHANGE UP (ref 150–400)
PLATELET # BLD AUTO: 403 K/UL — HIGH (ref 150–400)
POTASSIUM SERPL-MCNC: 4.3 MMOL/L — SIGNIFICANT CHANGE UP (ref 3.5–5.3)
POTASSIUM SERPL-SCNC: 4.3 MMOL/L — SIGNIFICANT CHANGE UP (ref 3.5–5.3)
RBC # BLD: 3 M/UL — LOW (ref 3.8–5.2)
RBC # BLD: 3.54 M/UL — LOW (ref 3.8–5.2)
RBC # FLD: 14.4 % — SIGNIFICANT CHANGE UP (ref 10.3–14.5)
RBC # FLD: 16.2 % — HIGH (ref 10.3–14.5)
SODIUM SERPL-SCNC: 128 MMOL/L — LOW (ref 135–145)
WBC # BLD: 10.9 K/UL — HIGH (ref 3.8–10.5)
WBC # BLD: 13.6 K/UL — HIGH (ref 3.8–10.5)
WBC # FLD AUTO: 10.9 K/UL — HIGH (ref 3.8–10.5)
WBC # FLD AUTO: 13.6 K/UL — HIGH (ref 3.8–10.5)

## 2018-09-21 RX ORDER — ACETAMINOPHEN 500 MG
650 TABLET ORAL EVERY 6 HOURS
Qty: 0 | Refills: 0 | Status: DISCONTINUED | OUTPATIENT
Start: 2018-09-21 | End: 2018-09-24

## 2018-09-21 RX ORDER — ACETAMINOPHEN 500 MG
1000 TABLET ORAL ONCE
Qty: 0 | Refills: 0 | Status: COMPLETED | OUTPATIENT
Start: 2018-09-21 | End: 2018-09-21

## 2018-09-21 RX ADMIN — Medication 1000 MILLIGRAM(S): at 18:06

## 2018-09-21 RX ADMIN — HEPARIN SODIUM 5000 UNIT(S): 5000 INJECTION INTRAVENOUS; SUBCUTANEOUS at 05:17

## 2018-09-21 RX ADMIN — HEPARIN SODIUM 5000 UNIT(S): 5000 INJECTION INTRAVENOUS; SUBCUTANEOUS at 21:31

## 2018-09-21 RX ADMIN — Medication 2.5 MILLIGRAM(S): at 17:36

## 2018-09-21 RX ADMIN — Medication 1000 MILLIGRAM(S): at 05:30

## 2018-09-21 RX ADMIN — HEPARIN SODIUM 5000 UNIT(S): 5000 INJECTION INTRAVENOUS; SUBCUTANEOUS at 13:25

## 2018-09-21 RX ADMIN — Medication 400 MILLIGRAM(S): at 05:15

## 2018-09-21 RX ADMIN — Medication 2.5 MILLIGRAM(S): at 11:13

## 2018-09-21 RX ADMIN — Medication 400 MILLIGRAM(S): at 17:35

## 2018-09-21 RX ADMIN — Medication 1000 MILLIGRAM(S): at 11:45

## 2018-09-21 RX ADMIN — Medication 400 MILLIGRAM(S): at 11:17

## 2018-09-21 NOTE — PHYSICAL THERAPY INITIAL EVALUATION ADULT - ADDITIONAL COMMENTS
Pt lives with her daughter in a private house with 2 small steps to negotiate. Pt has a Rollator and raised toilet seat.

## 2018-09-21 NOTE — PROGRESS NOTE ADULT - ATTENDING COMMENTS
I have seen and examined the patient. I agree with the above surgery resident's note.  await GI fxn, cont erp, physical therapy, will need rehab

## 2018-09-21 NOTE — PHYSICAL THERAPY INITIAL EVALUATION ADULT - BALANCE TRAINING, PT EVAL
GOAL: Patient will demonstrate an increase in static/dynamic balance in sitting/standing where deficient by at least 1 grade within 3 weeks to facilitate greater independence during functional mobility and ADL's.

## 2018-09-21 NOTE — PROVIDER CONTACT NOTE (OTHER) - ACTION/TREATMENT ORDERED:
MD notified, instructed to attempt to get urine, no order for straight cath at this time, will continue to try to obtain.
MD notified, instructed to keep pt NPO overnight, will continue to monitor patient.
will continue to monitor
will continue to monitor

## 2018-09-21 NOTE — PHYSICAL THERAPY INITIAL EVALUATION ADULT - STRENGTHENING, PT EVAL
GOAL: Patient will demonstrate a 1/3 increase in strength where deficient within 3 weeks to assist with performing functional mobility and ADLs.

## 2018-09-22 LAB
ANION GAP SERPL CALC-SCNC: 8 MMOL/L — SIGNIFICANT CHANGE UP (ref 5–17)
BUN SERPL-MCNC: 14 MG/DL — SIGNIFICANT CHANGE UP (ref 7–23)
CALCIUM SERPL-MCNC: 9.2 MG/DL — SIGNIFICANT CHANGE UP (ref 8.4–10.5)
CHLORIDE SERPL-SCNC: 103 MMOL/L — SIGNIFICANT CHANGE UP (ref 96–108)
CO2 SERPL-SCNC: 29 MMOL/L — SIGNIFICANT CHANGE UP (ref 22–31)
CREAT SERPL-MCNC: 1.24 MG/DL — SIGNIFICANT CHANGE UP (ref 0.5–1.3)
GLUCOSE SERPL-MCNC: 110 MG/DL — HIGH (ref 70–99)
HCT VFR BLD CALC: 30.6 % — LOW (ref 34.5–45)
HGB BLD-MCNC: 8.9 G/DL — LOW (ref 11.5–15.5)
MAGNESIUM SERPL-MCNC: 2.6 MG/DL — SIGNIFICANT CHANGE UP (ref 1.6–2.6)
MCHC RBC-ENTMCNC: 24.1 PG — LOW (ref 27–34)
MCHC RBC-ENTMCNC: 29.1 GM/DL — LOW (ref 32–36)
MCV RBC AUTO: 82.9 FL — SIGNIFICANT CHANGE UP (ref 80–100)
PHOSPHATE SERPL-MCNC: 2.4 MG/DL — LOW (ref 2.5–4.5)
PLATELET # BLD AUTO: 420 K/UL — HIGH (ref 150–400)
POTASSIUM SERPL-MCNC: 4.5 MMOL/L — SIGNIFICANT CHANGE UP (ref 3.5–5.3)
POTASSIUM SERPL-SCNC: 4.5 MMOL/L — SIGNIFICANT CHANGE UP (ref 3.5–5.3)
RBC # BLD: 3.69 M/UL — LOW (ref 3.8–5.2)
RBC # FLD: 16.5 % — HIGH (ref 10.3–14.5)
SODIUM SERPL-SCNC: 140 MMOL/L — SIGNIFICANT CHANGE UP (ref 135–145)
WBC # BLD: 10.69 K/UL — HIGH (ref 3.8–10.5)
WBC # FLD AUTO: 10.69 K/UL — HIGH (ref 3.8–10.5)

## 2018-09-22 RX ORDER — ATENOLOL 25 MG/1
50 TABLET ORAL DAILY
Qty: 0 | Refills: 0 | Status: DISCONTINUED | OUTPATIENT
Start: 2018-09-22 | End: 2018-09-24

## 2018-09-22 RX ORDER — SODIUM,POTASSIUM PHOSPHATES 278-250MG
1 POWDER IN PACKET (EA) ORAL
Qty: 0 | Refills: 0 | Status: COMPLETED | OUTPATIENT
Start: 2018-09-22 | End: 2018-09-23

## 2018-09-22 RX ADMIN — Medication 650 MILLIGRAM(S): at 22:42

## 2018-09-22 RX ADMIN — Medication 2.5 MILLIGRAM(S): at 05:00

## 2018-09-22 RX ADMIN — Medication 650 MILLIGRAM(S): at 11:00

## 2018-09-22 RX ADMIN — Medication 650 MILLIGRAM(S): at 02:25

## 2018-09-22 RX ADMIN — HEPARIN SODIUM 5000 UNIT(S): 5000 INJECTION INTRAVENOUS; SUBCUTANEOUS at 15:14

## 2018-09-22 RX ADMIN — Medication 2.5 MILLIGRAM(S): at 01:55

## 2018-09-22 RX ADMIN — HEPARIN SODIUM 5000 UNIT(S): 5000 INJECTION INTRAVENOUS; SUBCUTANEOUS at 22:41

## 2018-09-22 RX ADMIN — Medication 650 MILLIGRAM(S): at 18:18

## 2018-09-22 RX ADMIN — Medication 650 MILLIGRAM(S): at 01:55

## 2018-09-22 RX ADMIN — Medication 650 MILLIGRAM(S): at 10:21

## 2018-09-22 RX ADMIN — Medication 650 MILLIGRAM(S): at 18:15

## 2018-09-22 RX ADMIN — Medication 1 PACKET(S): at 18:16

## 2018-09-22 RX ADMIN — HEPARIN SODIUM 5000 UNIT(S): 5000 INJECTION INTRAVENOUS; SUBCUTANEOUS at 05:00

## 2018-09-22 NOTE — PROGRESS NOTE ADULT - ATTENDING COMMENTS
I saw and examined the pt and discussed the tx plan with the House Staff. I agree with the exam and plan as documented in the surgery resident's note from today.  Diamond Myers MD

## 2018-09-23 LAB
ANION GAP SERPL CALC-SCNC: 8 MMOL/L — SIGNIFICANT CHANGE UP (ref 5–17)
BUN SERPL-MCNC: 17 MG/DL — SIGNIFICANT CHANGE UP (ref 7–23)
CALCIUM SERPL-MCNC: 9.5 MG/DL — SIGNIFICANT CHANGE UP (ref 8.4–10.5)
CHLORIDE SERPL-SCNC: 101 MMOL/L — SIGNIFICANT CHANGE UP (ref 96–108)
CO2 SERPL-SCNC: 28 MMOL/L — SIGNIFICANT CHANGE UP (ref 22–31)
CREAT SERPL-MCNC: 1.19 MG/DL — SIGNIFICANT CHANGE UP (ref 0.5–1.3)
GLUCOSE SERPL-MCNC: 130 MG/DL — HIGH (ref 70–99)
HCT VFR BLD CALC: 35 % — SIGNIFICANT CHANGE UP (ref 34.5–45)
HGB BLD-MCNC: 9.9 G/DL — LOW (ref 11.5–15.5)
MAGNESIUM SERPL-MCNC: 2.4 MG/DL — SIGNIFICANT CHANGE UP (ref 1.6–2.6)
MCHC RBC-ENTMCNC: 24.1 PG — LOW (ref 27–34)
MCHC RBC-ENTMCNC: 28.3 GM/DL — LOW (ref 32–36)
MCV RBC AUTO: 85.4 FL — SIGNIFICANT CHANGE UP (ref 80–100)
PHOSPHATE SERPL-MCNC: 2.8 MG/DL — SIGNIFICANT CHANGE UP (ref 2.5–4.5)
PLATELET # BLD AUTO: 409 K/UL — HIGH (ref 150–400)
POTASSIUM SERPL-MCNC: 4.8 MMOL/L — SIGNIFICANT CHANGE UP (ref 3.5–5.3)
POTASSIUM SERPL-SCNC: 4.8 MMOL/L — SIGNIFICANT CHANGE UP (ref 3.5–5.3)
RBC # BLD: 4.1 M/UL — SIGNIFICANT CHANGE UP (ref 3.8–5.2)
RBC # FLD: 16.9 % — HIGH (ref 10.3–14.5)
SODIUM SERPL-SCNC: 137 MMOL/L — SIGNIFICANT CHANGE UP (ref 135–145)
WBC # BLD: 11.19 K/UL — HIGH (ref 3.8–10.5)
WBC # FLD AUTO: 11.19 K/UL — HIGH (ref 3.8–10.5)

## 2018-09-23 RX ADMIN — Medication 650 MILLIGRAM(S): at 20:00

## 2018-09-23 RX ADMIN — HEPARIN SODIUM 5000 UNIT(S): 5000 INJECTION INTRAVENOUS; SUBCUTANEOUS at 05:45

## 2018-09-23 RX ADMIN — Medication 650 MILLIGRAM(S): at 14:46

## 2018-09-23 RX ADMIN — ATENOLOL 50 MILLIGRAM(S): 25 TABLET ORAL at 05:42

## 2018-09-23 RX ADMIN — Medication 650 MILLIGRAM(S): at 13:43

## 2018-09-23 RX ADMIN — Medication 650 MILLIGRAM(S): at 02:29

## 2018-09-23 RX ADMIN — Medication 650 MILLIGRAM(S): at 09:28

## 2018-09-23 RX ADMIN — HEPARIN SODIUM 5000 UNIT(S): 5000 INJECTION INTRAVENOUS; SUBCUTANEOUS at 13:43

## 2018-09-23 RX ADMIN — HEPARIN SODIUM 5000 UNIT(S): 5000 INJECTION INTRAVENOUS; SUBCUTANEOUS at 22:28

## 2018-09-23 RX ADMIN — Medication 650 MILLIGRAM(S): at 01:00

## 2018-09-23 RX ADMIN — Medication 1 PACKET(S): at 05:45

## 2018-09-24 ENCOUNTER — TRANSCRIPTION ENCOUNTER (OUTPATIENT)
Age: 83
End: 2018-09-24

## 2018-09-24 VITALS
DIASTOLIC BLOOD PRESSURE: 75 MMHG | TEMPERATURE: 98 F | OXYGEN SATURATION: 95 % | SYSTOLIC BLOOD PRESSURE: 131 MMHG | RESPIRATION RATE: 18 BRPM | HEART RATE: 70 BPM

## 2018-09-24 LAB
ANION GAP SERPL CALC-SCNC: 6 MMOL/L — SIGNIFICANT CHANGE UP (ref 5–17)
BUN SERPL-MCNC: 18 MG/DL — SIGNIFICANT CHANGE UP (ref 7–23)
CALCIUM SERPL-MCNC: 9.5 MG/DL — SIGNIFICANT CHANGE UP (ref 8.4–10.5)
CHLORIDE SERPL-SCNC: 104 MMOL/L — SIGNIFICANT CHANGE UP (ref 96–108)
CO2 SERPL-SCNC: 28 MMOL/L — SIGNIFICANT CHANGE UP (ref 22–31)
CREAT SERPL-MCNC: 1.13 MG/DL — SIGNIFICANT CHANGE UP (ref 0.5–1.3)
GLUCOSE SERPL-MCNC: 117 MG/DL — HIGH (ref 70–99)
MAGNESIUM SERPL-MCNC: 2.2 MG/DL — SIGNIFICANT CHANGE UP (ref 1.6–2.6)
PHOSPHATE SERPL-MCNC: 2.9 MG/DL — SIGNIFICANT CHANGE UP (ref 2.5–4.5)
POTASSIUM SERPL-MCNC: 4.9 MMOL/L — SIGNIFICANT CHANGE UP (ref 3.5–5.3)
POTASSIUM SERPL-SCNC: 4.9 MMOL/L — SIGNIFICANT CHANGE UP (ref 3.5–5.3)
SODIUM SERPL-SCNC: 138 MMOL/L — SIGNIFICANT CHANGE UP (ref 135–145)

## 2018-09-24 PROCEDURE — 85027 COMPLETE CBC AUTOMATED: CPT

## 2018-09-24 PROCEDURE — 84100 ASSAY OF PHOSPHORUS: CPT

## 2018-09-24 PROCEDURE — 88309 TISSUE EXAM BY PATHOLOGIST: CPT

## 2018-09-24 PROCEDURE — 80048 BASIC METABOLIC PNL TOTAL CA: CPT

## 2018-09-24 PROCEDURE — 97162 PT EVAL MOD COMPLEX 30 MIN: CPT

## 2018-09-24 PROCEDURE — 85730 THROMBOPLASTIN TIME PARTIAL: CPT

## 2018-09-24 PROCEDURE — 88305 TISSUE EXAM BY PATHOLOGIST: CPT

## 2018-09-24 PROCEDURE — 86900 BLOOD TYPING SEROLOGIC ABO: CPT

## 2018-09-24 PROCEDURE — 97530 THERAPEUTIC ACTIVITIES: CPT

## 2018-09-24 PROCEDURE — C1889: CPT

## 2018-09-24 PROCEDURE — 85610 PROTHROMBIN TIME: CPT

## 2018-09-24 PROCEDURE — 83735 ASSAY OF MAGNESIUM: CPT

## 2018-09-24 PROCEDURE — 86901 BLOOD TYPING SEROLOGIC RH(D): CPT

## 2018-09-24 PROCEDURE — 88341 IMHCHEM/IMCYTCHM EA ADD ANTB: CPT

## 2018-09-24 PROCEDURE — 88342 IMHCHEM/IMCYTCHM 1ST ANTB: CPT

## 2018-09-24 PROCEDURE — 97116 GAIT TRAINING THERAPY: CPT

## 2018-09-24 RX ORDER — ACETAMINOPHEN 500 MG
2 TABLET ORAL
Qty: 40 | Refills: 0
Start: 2018-09-24 | End: 2018-09-28

## 2018-09-24 RX ADMIN — HEPARIN SODIUM 5000 UNIT(S): 5000 INJECTION INTRAVENOUS; SUBCUTANEOUS at 06:02

## 2018-09-24 RX ADMIN — Medication 650 MILLIGRAM(S): at 13:11

## 2018-09-24 RX ADMIN — Medication 650 MILLIGRAM(S): at 13:08

## 2018-09-24 RX ADMIN — ATENOLOL 50 MILLIGRAM(S): 25 TABLET ORAL at 06:03

## 2018-09-24 RX ADMIN — Medication 650 MILLIGRAM(S): at 08:40

## 2018-09-24 RX ADMIN — Medication 650 MILLIGRAM(S): at 08:35

## 2018-09-24 RX ADMIN — HEPARIN SODIUM 5000 UNIT(S): 5000 INJECTION INTRAVENOUS; SUBCUTANEOUS at 13:07

## 2018-09-24 NOTE — DISCHARGE NOTE ADULT - CARE PLAN
Principal Discharge DX:	Colon cancer  Goal:	sigmoid colectomy  Assessment and plan of treatment:	-laparoscopic sigmoid colectomy was performed with stapled EEA anastomosis  -pain controlled, diet advanced as appropriate. Principal Discharge DX:	Colon cancer  Goal:	sigmoid colectomy  Assessment and plan of treatment:	-laparoscopic sigmoid colectomy was performed with stapled EEA anastomosis  -pain controlled, diet advanced as appropriate.    No plan for chemotherapy or radiation therapy while in rehab.

## 2018-09-24 NOTE — PROGRESS NOTE ADULT - SUBJECTIVE AND OBJECTIVE BOX
SURGERY DAILY PROGRESS NOTE:       SUBJECTIVE/ROS: Patient examined at bedside. No acute events overnight.  - passing flatus and bm  - tolerating regular diet  - OOB to chair      OBJECTIVE:    Vital Signs Last 24 Hrs  T(C): 36.8 (24 Sep 2018 01:21), Max: 37.5 (23 Sep 2018 14:30)  T(F): 98.2 (24 Sep 2018 01:21), Max: 99.5 (23 Sep 2018 14:30)  HR: 60 (24 Sep 2018 01:21) (60 - 79)  BP: 151/73 (24 Sep 2018 01:21) (127/72 - 176/82)  BP(mean): --  RR: 18 (24 Sep 2018 01:21) (18 - 18)  SpO2: 98% (24 Sep 2018 01:21) (95% - 99%)    I&O's Detail    22 Sep 2018 07:01  -  23 Sep 2018 07:00  --------------------------------------------------------  IN:    Oral Fluid: 540 mL  Total IN: 540 mL    OUT:    Voided: 650 mL  Total OUT: 650 mL    Total NET: -110 mL      23 Sep 2018 07:01  -  24 Sep 2018 02:41  --------------------------------------------------------  IN:    Oral Fluid: 660 mL  Total IN: 660 mL    OUT:  Total OUT: 0 mL    Total NET: 660 mL          LABS:                        9.9    11.19 )-----------( 409      ( 23 Sep 2018 08:59 )             35.0     09-23    137  |  101  |  17  ----------------------------<  130<H>  4.8   |  28  |  1.19    Ca    9.5      23 Sep 2018 07:03  Phos  2.8     09-23  Mg     2.4     09-23      EXAM:  GEN: NAD  Pulm: unlabored breathing on RA  CV: radial pulse 2+, cap refill <2sec  Abd: soft, nontender, nondistended, incision CDI
[facility] SURGERY DAILY PROGRESS NOTE      SUBJECTIVE:    The patient was seen and examined at bedside this morning.  -  tolerating NPO, no n/v  -  awaiting ERP lap sigmoidectomy today      OBJECTIVE:    Vital Signs Last 24 Hrs  T(C): 37.1 (20 Sep 2018 04:55), Max: 38.1 (19 Sep 2018 17:25)  T(F): 98.7 (20 Sep 2018 04:55), Max: 100.6 (19 Sep 2018 17:25)  HR: 60 (20 Sep 2018 04:55) (60 - 65)  BP: 144/73 (20 Sep 2018 04:55) (109/61 - 157/74)  BP(mean): --  RR: 18 (20 Sep 2018 04:55) (18 - 18)  SpO2: 99% (20 Sep 2018 04:55) (96% - 100%)    I&O's Detail    19 Sep 2018 07:01  -  20 Sep 2018 05:42  --------------------------------------------------------  IN:  Total IN: 0 mL    OUT:    Voided: 1 mL  Total OUT: 1 mL    Total NET: -1 mL          LABS:                        10.2   15.1  )-----------( 398      ( 19 Sep 2018 16:45 )             31.7     09-19    140  |  97  |  20  ----------------------------<  202<H>  4.3   |  33<H>  |  1.26    Ca    9.7      19 Sep 2018 14:39  Phos  2.1     09-19  Mg     2.8     09-19      PT/INR - ( 19 Sep 2018 14:39 )   PT: 15.0 sec;   INR: 1.38 ratio         PTT - ( 19 Sep 2018 14:39 )  PTT:23.6 sec        EXAM:  Gen:       alert, in NAD  Lungs:       unlabored breathing  CV:       regular rate, rhythm  Abd:       nondistended, nontender, soft  Ext:        no edema
Patient is a 89y old  Female who presents with a chief complaint of     INTERVAL HPI/OVERNIGHT EVENTS:  No melena.  No rectal bleeding.    MEDICATIONS  (STANDING):  acetaminophen   Tablet .. 650 milliGRAM(s) Oral every 6 hours  heparin  Injectable 5000 Unit(s) SubCutaneous every 8 hours  influenza   Vaccine 0.5 milliLiter(s) IntraMuscular once  metoprolol tartrate Injectable 2.5 milliGRAM(s) IV Push every 6 hours    MEDICATIONS  (PRN):      Allergies    No Known Allergies    Intolerances        Review of Systems:    General:  No wt loss, fevers, chills, night sweats,fatigue,   ENT:  No sore throat, pain, runny nose, dysphagia  CV:  No pain, palpitatioins, hypo/hypertension  Resp:  No dyspnea, cough, tachypnea, wheezing  Neuro:  No weakness, tingling, memory problems  Heme:  No petechiae, ecchymosis, easy bruisability  Otherwise 10 point ROS negative    Vital Signs Last 24 Hrs  T(C): 36.5 (21 Sep 2018 05:10), Max: 36.8 (20 Sep 2018 16:14)  T(F): 97.7 (21 Sep 2018 05:10), Max: 98.2 (20 Sep 2018 16:14)  HR: 58 (21 Sep 2018 05:10) (54 - 65)  BP: 146/78 (21 Sep 2018 05:10) (115/58 - 150/65)  BP(mean): 111 (20 Sep 2018 14:30) (80 - 111)  RR: 18 (21 Sep 2018 05:10) (15 - 18)  SpO2: 95% (21 Sep 2018 05:10) (95% - 100%)    PHYSICAL EXAM:    Constitutional: NAD, well-developed  Neck: No LAD, supple  Respiratory: clear to auscultation b/l no rales, rhonchi, wheezing  Cardiovascular: S1 and S2, RRR, no murmur  Gastrointestinal: +BS x4, soft, NT/ND, neg HSM,  Extremities: No peripheral edema, neg clubbing, cyanosis  Vascular: 2+ peripheral pulses  Neurological: A/O x 3, no focal deficits  Psychiatric: Normal mood, normal affect  Skin: No rashes    LABS:                        7.3    10.90 )-----------( 322      ( 21 Sep 2018 07:50 )             24.7     09-21    128<L>  |  95<L>  |  10  ----------------------------<  104<H>  4.3   |  24  |  1.01    Ca    8.0<L>      21 Sep 2018 07:17  Phos  2.1     09-21  Mg     2.2     09-21      PT/INR - ( 20 Sep 2018 06:47 )   PT: 14.8 sec;   INR: 1.36 ratio         PTT - ( 20 Sep 2018 06:47 )  PTT:24.8 sec        RADIOLOGY & ADDITIONAL TESTS:
Patient is a 89y old  Female who presents with a chief complaint of     INTERVAL HPI/OVERNIGHT EVENTS:  Tolerating PO diet.    MEDICATIONS  (STANDING):  acetaminophen   Tablet .. 650 milliGRAM(s) Oral every 6 hours  heparin  Injectable 5000 Unit(s) SubCutaneous every 8 hours  influenza   Vaccine 0.5 milliLiter(s) IntraMuscular once  metoprolol tartrate Injectable 2.5 milliGRAM(s) IV Push every 6 hours    MEDICATIONS  (PRN):      Allergies    No Known Allergies    Intolerances        Review of Systems:    General:  No wt loss, fevers, chills, night sweats,fatigue,   ENT:  No sore throat, pain, runny nose, dysphagia  CV:  No pain, palpitatioins, hypo/hypertension  Resp:  No dyspnea, cough, tachypnea, wheezing  Neuro:  No weakness, tingling, memory problems  Heme:  No petechiae, ecchymosis, easy bruisability  Otherwise 10 point ROS negative    Vital Signs Last 24 Hrs  T(F): 98.1 (09-24-18 @ 04:50), Max: 99.5 (09-23-18 @ 14:30)  HR: 60 (09-24-18 @ 04:50) (60 - 79)  BP: 161/74 (09-24-18 @ 04:50) (127/72 - 161/74)  RR: 18 (09-24-18 @ 04:50) (18 - 18)  SpO2: 96% (09-24-18 @ 04:50) (95% - 99%)  Wt(kg): --  ,   I&O's Summary    23 Sep 2018 07:01  -  24 Sep 2018 07:00  --------------------------------------------------------  IN: 660 mL / OUT: 200 mL / NET: 460 mL          PHYSICAL EXAM:    Constitutional: NAD, well-developed  Neck: No LAD, supple  Respiratory: clear to auscultation b/l no rales, rhonchi, wheezing  Cardiovascular: S1 and S2, RRR, no murmur  Gastrointestinal: +BS x4, soft, NT/ND, neg HSM,  Extremities: No peripheral edema, neg clubbing, cyanosis  Vascular: 2+ peripheral pulses  Neurological: A/O x 3, no focal deficits  Psychiatric: Normal mood, normal affect  Skin: No rashes    LABS:                                   9.9    11.19 )-----------( 409      ( 23 Sep 2018 08:59 )             35.0               09-24    138  |  104  |  18  ----------------------------<  117<H>  4.9   |  28  |  1.13    Ca    9.5      24 Sep 2018 07:10  Phos  2.9     09-24  Mg     2.2     09-24                               RADIOLOGY & ADDITIONAL TESTS:
Patient is a 89y old  Female who presents with a chief complaint of Colon cancer (21 Sep 2018 11:36)      SUBJECTIVE / OVERNIGHT EVENTS: Comfortable without new complaints.   Review of Systems  chest pain no  palpitations no  sob no  nausea no  headache no    MEDICATIONS  (STANDING):  acetaminophen   Tablet .. 650 milliGRAM(s) Oral every 6 hours  ATENolol  Tablet 50 milliGRAM(s) Oral daily  heparin  Injectable 5000 Unit(s) SubCutaneous every 8 hours  influenza   Vaccine 0.5 milliLiter(s) IntraMuscular once  potassium phosphate / sodium phosphate powder 1 Packet(s) Oral two times a day    MEDICATIONS  (PRN):      Vital Signs Last 24 Hrs  T(C): 36.7 (22 Sep 2018 14:31), Max: 37 (21 Sep 2018 22:04)  T(F): 98 (22 Sep 2018 14:31), Max: 98.6 (21 Sep 2018 22:04)  HR: 63 (22 Sep 2018 14:31) (57 - 83)  BP: 164/75 (22 Sep 2018 14:31) (128/66 - 169/72)  BP(mean): --  RR: 18 (22 Sep 2018 14:31) (18 - 18)  SpO2: 97% (22 Sep 2018 14:31) (92% - 98%)    PHYSICAL EXAM:  GENERAL: NAD, well-developed  HEAD:  Atraumatic, Normocephalic  EYES: EOMI, PERRLA, conjunctiva and sclera clear  NECK: Supple, No JVD  CHEST/LUNG: Clear to auscultation bilaterally; No wheeze  HEART: Regular rate and rhythm; No murmurs, rubs, or gallops  ABDOMEN: Soft, Nontender, Nondistended; Bowel sounds present Incision healing.  EXTREMITIES:  2+ Peripheral Pulses, No clubbing, cyanosis, or edema  PSYCH: AAOx3  NEUROLOGY: non-focal  SKIN: No rashes or lesions    LABS:                        8.9    10.69 )-----------( 420      ( 22 Sep 2018 10:18 )             30.6     09-22    140  |  103  |  14  ----------------------------<  110<H>  4.5   |  29  |  1.24    Ca    9.2      22 Sep 2018 09:16  Phos  2.4     09-22  Mg     2.6     09-22                  RADIOLOGY & ADDITIONAL TESTS:    Imaging Personally Reviewed:    Consultant(s) Notes Reviewed:      Care Discussed with Consultants/Other Providers:
Patient is a 89y old  Female who presents with a chief complaint of Colon cancer (21 Sep 2018 11:36)      SUBJECTIVE / OVERNIGHT EVENTS: Comfortable without new complaints. +BM + flatus  Review of Systems  chest pain no  palpitations no  sob no  nausea no  headache no    MEDICATIONS  (STANDING):  acetaminophen   Tablet .. 650 milliGRAM(s) Oral every 6 hours  ATENolol  Tablet 50 milliGRAM(s) Oral daily  heparin  Injectable 5000 Unit(s) SubCutaneous every 8 hours  influenza   Vaccine 0.5 milliLiter(s) IntraMuscular once    MEDICATIONS  (PRN):      Vital Signs Last 24 Hrs  T(C): 37.5 (23 Sep 2018 14:30), Max: 37.5 (23 Sep 2018 14:30)  T(F): 99.5 (23 Sep 2018 14:30), Max: 99.5 (23 Sep 2018 14:30)  HR: 64 (23 Sep 2018 14:30) (64 - 77)  BP: 150/76 (23 Sep 2018 14:30) (133/73 - 176/82)  BP(mean): --  RR: 18 (23 Sep 2018 14:30) (18 - 18)  SpO2: 96% (23 Sep 2018 14:30) (96% - 97%)    PHYSICAL EXAM:  GENERAL: NAD, well-developed  HEAD:  Atraumatic, Normocephalic  EYES: EOMI, PERRLA, conjunctiva and sclera clear  NECK: Supple, No JVD  CHEST/LUNG: Clear to auscultation bilaterally; No wheeze  HEART: Regular rate and rhythm; No murmurs, rubs, or gallops  ABDOMEN: Soft, Nontender, Nondistended; Bowel sounds present  EXTREMITIES:  2+ Peripheral Pulses, No clubbing, cyanosis, or edema  PSYCH: AAOx3  NEUROLOGY: non-focal  SKIN: No rashes or lesions    LABS:                        9.9    11.19 )-----------( 409      ( 23 Sep 2018 08:59 )             35.0     09-23    137  |  101  |  17  ----------------------------<  130<H>  4.8   |  28  |  1.19    Ca    9.5      23 Sep 2018 07:03  Phos  2.8     09-23  Mg     2.4     09-23                  RADIOLOGY & ADDITIONAL TESTS:    Imaging Personally Reviewed:    Consultant(s) Notes Reviewed:      Care Discussed with Consultants/Other Providers:
Patient is a 89y old  Female who presents with a chief complaint of Colon cancer (21 Sep 2018 11:36)      SUBJECTIVE / OVERNIGHT EVENTS: Comfortable without new complaints. No flatus or BM. Daughter at bedside  Review of Systems  chest pain no  palpitations no  sob no  nausea no  headache no    MEDICATIONS  (STANDING):  acetaminophen   Tablet .. 650 milliGRAM(s) Oral every 6 hours  heparin  Injectable 5000 Unit(s) SubCutaneous every 8 hours  influenza   Vaccine 0.5 milliLiter(s) IntraMuscular once  metoprolol tartrate Injectable 2.5 milliGRAM(s) IV Push every 6 hours    MEDICATIONS  (PRN):      Vital Signs Last 24 Hrs  T(C): 36.8 (21 Sep 2018 17:27), Max: 37.1 (21 Sep 2018 11:50)  T(F): 98.2 (21 Sep 2018 17:27), Max: 98.7 (21 Sep 2018 11:50)  HR: 61 (21 Sep 2018 17:27) (58 - 83)  BP: 136/73 (21 Sep 2018 17:27) (114/62 - 168/75)  BP(mean): --  RR: 18 (21 Sep 2018 17:27) (18 - 18)  SpO2: 98% (21 Sep 2018 17:27) (95% - 98%)    PHYSICAL EXAM:  GENERAL: NAD, well-developed  HEAD:  Atraumatic, Normocephalic  EYES: EOMI, PERRLA, conjunctiva and sclera clear  NECK: Supple, No JVD  CHEST/LUNG: Clear to auscultation bilaterally; No wheeze  HEART: Regular rate and rhythm; No murmurs, rubs, or gallops  ABDOMEN: Soft, Nontender, Nondistended; Bowel sounds absent.  EXTREMITIES:  2+ Peripheral Pulses, No clubbing, cyanosis, or edema  PSYCH: AAOx3  NEUROLOGY: non-focal  SKIN: No rashes or lesions    LABS:                        10.2   13.6  )-----------( 403      ( 21 Sep 2018 12:46 )             29.2     09-21    128<L>  |  95<L>  |  10  ----------------------------<  104<H>  4.3   |  24  |  1.01    Ca    8.0<L>      21 Sep 2018 07:17  Phos  2.1     09-21  Mg     2.2     09-21      PT/INR - ( 20 Sep 2018 06:47 )   PT: 14.8 sec;   INR: 1.36 ratio         PTT - ( 20 Sep 2018 06:47 )  PTT:24.8 sec            RADIOLOGY & ADDITIONAL TESTS:    Imaging Personally Reviewed:    Consultant(s) Notes Reviewed:      Care Discussed with Consultants/Other Providers:
Patient is a 89y old  Female who presents with a chief complaint of Colon cancer (24 Sep 2018 08:54)      SUBJECTIVE / OVERNIGHT EVENTS: Comfortable without new complaints. Tolerating diet.  Review of Systems  chest pain no  palpitations no  sob no  nausea no  headache no    MEDICATIONS  (STANDING):  acetaminophen   Tablet .. 650 milliGRAM(s) Oral every 6 hours  ATENolol  Tablet 50 milliGRAM(s) Oral daily  heparin  Injectable 5000 Unit(s) SubCutaneous every 8 hours  influenza   Vaccine 0.5 milliLiter(s) IntraMuscular once    MEDICATIONS  (PRN):      Vital Signs Last 24 Hrs  T(C): 36.8 (24 Sep 2018 10:31), Max: 37.5 (23 Sep 2018 14:30)  T(F): 98.2 (24 Sep 2018 10:31), Max: 99.5 (23 Sep 2018 14:30)  HR: 69 (24 Sep 2018 10:31) (60 - 79)  BP: 123/68 (24 Sep 2018 10:31) (123/68 - 161/74)  BP(mean): --  RR: 18 (24 Sep 2018 10:31) (18 - 18)  SpO2: 97% (24 Sep 2018 10:31) (95% - 99%)    PHYSICAL EXAM:  GENERAL: NAD  HEAD:  Atraumatic, Normocephalic  EYES: EOMI, PERRLA, conjunctiva and sclera clear  NECK: Supple, No JVD  CHEST/LUNG: Clear to auscultation bilaterally; No wheeze  HEART: Regular rate and rhythm; No murmurs, rubs, or gallops  ABDOMEN: Soft, Nontender, Nondistended; Bowel sounds present  EXTREMITIES:  2+ Peripheral Pulses, No clubbing, cyanosis, or edema  PSYCH: AAOx3  NEUROLOGY: non-focal  SKIN: No rashes or lesions    LABS:                        9.9    11.19 )-----------( 409      ( 23 Sep 2018 08:59 )             35.0     09-24    138  |  104  |  18  ----------------------------<  117<H>  4.9   |  28  |  1.13    Ca    9.5      24 Sep 2018 07:10  Phos  2.9     09-24  Mg     2.2     09-24                  RADIOLOGY & ADDITIONAL TESTS:    Imaging Personally Reviewed:    Consultant(s) Notes Reviewed:      Care Discussed with Consultants/Other Providers:
SURGERY DAILY PROGRESS NOTE:       SUBJECTIVE/ROS: Patient examined at bedside. No acute events overnight.  - passing flatus and bm  - tolerating regular diet  - OOB to chair      OBJECTIVE:    Vital Signs Last 24 Hrs  T(C): 36.7 (23 Sep 2018 01:24), Max: 36.8 (22 Sep 2018 20:42)  T(F): 98.1 (23 Sep 2018 01:24), Max: 98.3 (22 Sep 2018 20:42)  HR: 76 (23 Sep 2018 01:24) (60 - 77)  BP: 153/79 (23 Sep 2018 01:24) (133/73 - 169/72)  BP(mean): --  RR: 18 (23 Sep 2018 01:24) (18 - 18)  SpO2: 97% (23 Sep 2018 01:24) (96% - 97%)    I&O's Detail    21 Sep 2018 07:01  -  22 Sep 2018 07:00  --------------------------------------------------------  IN:    Oral Fluid: 700 mL  Total IN: 700 mL    OUT:    Indwelling Catheter - Urethral: 175 mL    Voided: 400 mL  Total OUT: 575 mL    Total NET: 125 mL      22 Sep 2018 07:01  -  23 Sep 2018 03:45  --------------------------------------------------------  IN:    Oral Fluid: 540 mL  Total IN: 540 mL    OUT:    Voided: 650 mL  Total OUT: 650 mL    Total NET: -110 mL          LABS:                        8.9    10.69 )-----------( 420      ( 22 Sep 2018 10:18 )             30.6     09-22    140  |  103  |  14  ----------------------------<  110<H>  4.5   |  29  |  1.24    Ca    9.2      22 Sep 2018 09:16  Phos  2.4     09-22  Mg     2.6     09-22      EXAM:  GEN: NAD  Pulm: unlabored breathing on RA  CV: radial pulse 2+, cap refil <2sec  Abd: soft, nontender, nondistended incision CDI
SURGERY DAILY PROGRESS NOTE:       SUBJECTIVE/ROS: Patient examined at bedside. No acute events overnight. Tolerated CLD w/o N/V. No GI fxn. Pain well controlled. Denies fevers/chills.          MEDICATIONS  (STANDING):  acetaminophen  IVPB .. 1000 milliGRAM(s) IV Intermittent once  heparin  Injectable 5000 Unit(s) SubCutaneous every 8 hours  influenza   Vaccine 0.5 milliLiter(s) IntraMuscular once  lactated ringers. 1000 milliLiter(s) (40 mL/Hr) IV Continuous <Continuous>  metoprolol tartrate Injectable 2.5 milliGRAM(s) IV Push every 6 hours    MEDICATIONS  (PRN):      OBJECTIVE:    Vital Signs Last 24 Hrs  T(C): 36.5 (21 Sep 2018 05:10), Max: 36.8 (20 Sep 2018 16:14)  T(F): 97.7 (21 Sep 2018 05:10), Max: 98.2 (20 Sep 2018 16:14)  HR: 58 (21 Sep 2018 05:10) (54 - 65)  BP: 146/78 (21 Sep 2018 05:10) (115/58 - 162/67)  BP(mean): 111 (20 Sep 2018 14:30) (80 - 122)  RR: 18 (21 Sep 2018 05:10) (15 - 18)  SpO2: 95% (21 Sep 2018 05:10) (95% - 100%)        I&O's Detail    19 Sep 2018 07:01  -  20 Sep 2018 07:00  --------------------------------------------------------  IN:    sodium chloride 0.9% with potassium chloride 20 mEq/L: 375 mL  Total IN: 375 mL    OUT:    Voided: 1 mL  Total OUT: 1 mL    Total NET: 374 mL      20 Sep 2018 07:01  -  21 Sep 2018 05:36  --------------------------------------------------------  IN:    IV PiggyBack: 200 mL    lactated ringers.: 120 mL    lactated ringers.: 120 mL  Total IN: 440 mL    OUT:    Indwelling Catheter - Urethral: 1150 mL  Total OUT: 1150 mL    Total NET: -710 mL          Daily     Daily     LABS:                        9.8    16.9  )-----------( 393      ( 20 Sep 2018 12:08 )             31.8     09-20    137  |  99  |  16  ----------------------------<  189<H>  3.6   |  25  |  1.14    Ca    8.6      20 Sep 2018 12:08  Phos  2.1     09-19  Mg     2.8     09-19      PT/INR - ( 20 Sep 2018 06:47 )   PT: 14.8 sec;   INR: 1.36 ratio         PTT - ( 20 Sep 2018 06:47 )  PTT:24.8 sec              PHYSICAL EXAM:  GEN: NAD  Pulm: unlabored breathing on RA  CV: radial pulse 2+, cap refil <2sec  Abd: soft, RLQ moderated tenderness, nondistended, incision CDI w/ surgical dressing
SURGERY DAILY PROGRESS NOTE:       SUBJECTIVE/ROS: Patient examined at bedside. No acute events overnight. Yesterday d/c'ed Drew voided well. Tolerates LRD w/o N/V. +BM/+Flatus. Ambulates.          MEDICATIONS  (STANDING):  acetaminophen   Tablet .. 650 milliGRAM(s) Oral every 6 hours  heparin  Injectable 5000 Unit(s) SubCutaneous every 8 hours  influenza   Vaccine 0.5 milliLiter(s) IntraMuscular once  metoprolol tartrate Injectable 2.5 milliGRAM(s) IV Push every 6 hours    MEDICATIONS  (PRN):      OBJECTIVE:    Vital Signs Last 24 Hrs  T(C): 36.4 (22 Sep 2018 04:59), Max: 37.1 (21 Sep 2018 11:50)  T(F): 97.6 (22 Sep 2018 04:59), Max: 98.7 (21 Sep 2018 11:50)  HR: 64 (22 Sep 2018 04:59) (57 - 83)  BP: 169/72 (22 Sep 2018 04:59) (114/62 - 169/72)  BP(mean): --  RR: 18 (22 Sep 2018 04:59) (18 - 18)  SpO2: 96% (22 Sep 2018 04:59) (92% - 98%)        I&O's Detail    20 Sep 2018 07:01  -  21 Sep 2018 07:00  --------------------------------------------------------  IN:    IV PiggyBack: 200 mL    lactated ringers.: 600 mL    lactated ringers.: 120 mL  Total IN: 920 mL    OUT:    Indwelling Catheter - Urethral: 1150 mL  Total OUT: 1150 mL    Total NET: -230 mL      21 Sep 2018 07:01  -  22 Sep 2018 05:21  --------------------------------------------------------  IN:    Oral Fluid: 700 mL  Total IN: 700 mL    OUT:    Indwelling Catheter - Urethral: 175 mL    Voided: 400 mL  Total OUT: 575 mL    Total NET: 125 mL          Daily     Daily     LABS:                        10.2   13.6  )-----------( 403      ( 21 Sep 2018 12:46 )             29.2     09-21    128<L>  |  95<L>  |  10  ----------------------------<  104<H>  4.3   |  24  |  1.01    Ca    8.0<L>      21 Sep 2018 07:17  Phos  2.1     09-21  Mg     2.2     09-21      PT/INR - ( 20 Sep 2018 06:47 )   PT: 14.8 sec;   INR: 1.36 ratio         PTT - ( 20 Sep 2018 06:47 )  PTT:24.8 sec              PHYSICAL EXAM:  GEN: NAD  Pulm: unlabored breathing on RA  CV: radial pulse 2+, cap refil <2sec  Abd: soft, nontender, nondistended incision CDI

## 2018-09-24 NOTE — DISCHARGE NOTE ADULT - MEDICATION SUMMARY - MEDICATIONS TO TAKE
I will START or STAY ON the medications listed below when I get home from the hospital:    Citracal  -- 1 dose(s) by mouth once a day  -- Indication: For Home med    acetaminophen 325 mg oral tablet  -- 2 tab(s) by mouth every 6 hours, As Needed for pain  -- Indication: For pain    warfarin 4 mg oral tablet  -- 1 tab(s) by mouth once a day  -- Indication: For Home med    Welchol 625 mg oral tablet  -- 3 tab(s) by mouth 2 times a day  -- Indication: For Home med    exemestane 25 mg oral tablet  -- 1 tab(s) by mouth once a day  -- Indication: For Home med    atenolol 50 mg oral tablet  -- 1 tab(s) by mouth once a day  -- Indication: For Home med    Lutein 20 mg oral capsule  -- 1 cap(s) by mouth once a day  -- Indication: For Home med    latanoprost 0.005% ophthalmic solution  -- 1 drop(s) to each affected eye once a day (in the evening)  -- Indication: For Home med

## 2018-09-24 NOTE — DISCHARGE NOTE ADULT - ADDITIONAL INSTRUCTIONS
WOUND CARE:    BATHING: Please do not submerge wound underwater. You may shower and/or sponge bathe.  ACTIVITY: No heavy lifting or straining. Otherwise, you may return to your usual level of physical activity. If you are taking narcotic pain medication (such as Percocet) DO NOT drive a car, operate machinery or make important decisions.  DIET: Return to your usual diet.  NOTIFY YOUR SURGEON IF: You have any bleeding that does not stop, any pus draining from your wound(s), any fever (over 100.4 F) or chills, persistent nausea/vomiting, persistent diarrhea, or if your pain is not controlled on your discharge pain medications.  FOLLOW-UP: Please follow up with your primary care physician in one week regarding your hospitalization  ALSO, Please follow up with Dr. Velazquez in office in 1-2 weeks, please call [phone] to set up appointment

## 2018-09-24 NOTE — PROGRESS NOTE ADULT - ASSESSMENT
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 Appropriate recovery    - C/w LRD  - Dispo: subacute rehab today    [place] [id number]
89 f s/p colon mass resection  Postop care  pain control  await full return of GI fx  low dose BB   DVT-restart AC with Coumadin when surgically stable.  PT  DCP rehab  Julio Whitmore MD pager [phone]
89 f s/p colon mass resection  Postop care  pain control  await full return of GI fx  low dose BB   DVT-restart AC with Coumadin when surgically stable.  d/w daughter at bedside  Julio Whitmore MD pager [phone]
89 f s/p colon mass resection  Postop care  pain control  await return of GI fx  low dose BB   DVT-restart AC with Coumadin when surgically stable.  d/w daughter at bedside  Julio Whitmore MD pager [phone]
89 f s/p colon mass resection  Postop care  pain control  low dose BB   DVT-restart AC with Coumadin when surgically stable.  PT  Medically stable.  DCP rehab  Julio Whitmore MD pager [phone]
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 Appropriate recovery      - Advance to LRD if passes flatus  - D/c IVF   - D/c Drew  - Pain control  - PT/OOB/Ambulation  -DVT PPX       [place] [facility]
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 Appropriate recovery      - C/w LRD  - Pain control  - PT/OOB/Ambulation  - DVT PPX   - Dispo: subacute rehab       [place] [id number]
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 Appropriate recovery    - C/w LRD  - Pain control  - PT/OOB/Ambulation  - DVT PPX   - Dispo: subacute rehab, Monday    [place] 8008
89yo F admitted for scheduled ERP lap sigmoid colectomy on 9/20    -  NPO, ERP prep protocol for OR today  -  give atenolol this AM  -  DVT ppx, holding coumadin for procedure    [facility]  [id number]
· Assessment		  89 y.o. female who had a recent work-up for rectal bleeding.  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  Patient is now s/p surgery for colo-rectal cancer.   Pain is being controlled.  No melena.  No rectal bleeding.    Plan:  1.  Appreciate excellent surgical care.  2.  Post-op diet per surgery.  3.  Await surgical path.
· Assessment		  89 y.o. female who had a recent work-up for rectal bleeding.  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  Patient is now s/p surgery for colo-rectal cancer.   Pain is being controlled.  No melena.  No rectal bleeding.  Tolerating regular diet.    Plan:  1.  D/C planning to sub-acute rehab noted.  2.  Post-op diet per surgery.  3.  Await surgical path.

## 2018-09-24 NOTE — DISCHARGE NOTE ADULT - CARE PROVIDER_API CALL
Jabari Velazquez), ColonRectal Surgery; Surgery  [address] [facility]  [address] [place] [address]  Phone: [phone]  Fax: [phone]

## 2018-09-24 NOTE — DISCHARGE NOTE ADULT - HOSPITAL COURSE
89F PMH of fall 2 years ago, left lower extremity DVT developed after discharge from rehabilitation center due to decreased mobility, has remained on coumadin for past 2 years, c/o BRPR w/ frequent stools over past few months, found to have malignant neoplasm of sigmoid colon.   She had lap sigmoid colectomy on 9/20 with an uneventful postop course. Her pain is well-controlled, tolerating diet, and ambulating, and is discharged today 9/24 in stable condition.  She should follow up with surgery, Dr. Velazquez [phone] in office in 1-2 weeks. 89F PMH of fall 2 years ago, left lower extremity DVT developed after discharge from rehabilitation center due to decreased mobility, has remained on coumadin for past 2 years, c/o BRPR w/ frequent stools over past few months, found to have malignant neoplasm of sigmoid colon.   She had lap sigmoid colectomy on 9/20 with an uneventful postop course. Her pain is well-controlled, tolerating diet, and ambulating, and is discharged today 9/24 in stable condition.  She should follow up with surgery, Dr. Velazquez [phone] in office in 1-2 weeks.    No plan for chemotherapy or radiation therapy while in rehab.

## 2018-09-24 NOTE — PROGRESS NOTE ADULT - PROVIDER SPECIALTY LIST ADULT
Gastroenterology
Gastroenterology
Internal Medicine
Surgery

## 2018-09-24 NOTE — DISCHARGE NOTE ADULT - PATIENT PORTAL LINK FT
You can access the [facility] Patient Portal, offered by [facility], by registering with the following website: [email or web address]

## 2018-09-24 NOTE — DISCHARGE NOTE ADULT - PLAN OF CARE
sigmoid colectomy -laparoscopic sigmoid colectomy was performed with stapled EEA anastomosis  -pain controlled, diet advanced as appropriate. -laparoscopic sigmoid colectomy was performed with stapled EEA anastomosis  -pain controlled, diet advanced as appropriate.    No plan for chemotherapy or radiation therapy while in rehab.

## 2018-09-25 LAB — SURGICAL PATHOLOGY STUDY: SIGNIFICANT CHANGE UP

## 2018-10-09 ENCOUNTER — APPOINTMENT (OUTPATIENT)
Dept: SURGERY | Facility: CLINIC | Age: 83
End: 2018-10-09
Payer: MEDICARE

## 2018-10-09 VITALS
RESPIRATION RATE: 14 BRPM | TEMPERATURE: 97.3 F | DIASTOLIC BLOOD PRESSURE: 79 MMHG | SYSTOLIC BLOOD PRESSURE: 146 MMHG | HEART RATE: 71 BPM | OXYGEN SATURATION: 99 %

## 2018-10-09 DIAGNOSIS — Z09 ENCOUNTER FOR FOLLOW-UP EXAMINATION AFTER COMPLETED TREATMENT FOR CONDITIONS OTHER THAN MALIGNANT NEOPLASM: ICD-10-CM

## 2018-10-09 PROCEDURE — 99024 POSTOP FOLLOW-UP VISIT: CPT

## 2018-10-09 RX ORDER — SODIUM PICOSULFATE, MAGNESIUM OXIDE, AND ANHYDROUS CITRIC ACID 10; 3.5; 12 MG/160ML; G/160ML; G/160ML
10-3.5-12 MG-GM LIQUID ORAL
Qty: 1 | Refills: 0 | Status: DISCONTINUED | COMMUNITY
Start: 2018-09-11 | End: 2018-10-09

## 2018-12-14 ENCOUNTER — OUTPATIENT (OUTPATIENT)
Dept: OUTPATIENT SERVICES | Facility: HOSPITAL | Age: 83
LOS: 1 days | Discharge: HOME | End: 2018-12-14

## 2018-12-14 DIAGNOSIS — Z90.89 ACQUIRED ABSENCE OF OTHER ORGANS: Chronic | ICD-10-CM

## 2018-12-14 DIAGNOSIS — Z98.890 OTHER SPECIFIED POSTPROCEDURAL STATES: Chronic | ICD-10-CM

## 2018-12-14 DIAGNOSIS — Z98.49 CATARACT EXTRACTION STATUS, UNSPECIFIED EYE: Chronic | ICD-10-CM

## 2018-12-14 DIAGNOSIS — D64.9 ANEMIA, UNSPECIFIED: ICD-10-CM

## 2018-12-18 ENCOUNTER — APPOINTMENT (OUTPATIENT)
Dept: HEMATOLOGY ONCOLOGY | Facility: CLINIC | Age: 83
End: 2018-12-18

## 2019-01-19 NOTE — PROVIDER CONTACT NOTE (OTHER) - ASSESSMENT
6SOU/619b KALE 5NOR/500b Pt is not c/o bladder fullness. ABdomen is soft. Post void urine is 0 cc. 4S/417b

## 2019-02-21 ENCOUNTER — APPOINTMENT (OUTPATIENT)
Dept: ORTHOPEDIC SURGERY | Facility: CLINIC | Age: 84
End: 2019-02-21
Payer: MEDICARE

## 2019-02-21 VITALS — BODY MASS INDEX: 29.23 KG/M2 | HEIGHT: 63 IN | WEIGHT: 165 LBS

## 2019-02-21 DIAGNOSIS — Z86.39 PERSONAL HISTORY OF OTHER ENDOCRINE, NUTRITIONAL AND METABOLIC DISEASE: ICD-10-CM

## 2019-02-21 DIAGNOSIS — Z80.9 FAMILY HISTORY OF MALIGNANT NEOPLASM, UNSPECIFIED: ICD-10-CM

## 2019-02-21 DIAGNOSIS — Z87.39 PERSONAL HISTORY OF OTHER DISEASES OF THE MUSCULOSKELETAL SYSTEM AND CONNECTIVE TISSUE: ICD-10-CM

## 2019-02-21 DIAGNOSIS — Z85.9 PERSONAL HISTORY OF MALIGNANT NEOPLASM, UNSPECIFIED: ICD-10-CM

## 2019-02-21 PROCEDURE — 20610 DRAIN/INJ JOINT/BURSA W/O US: CPT | Mod: 50

## 2019-02-21 PROCEDURE — 73562 X-RAY EXAM OF KNEE 3: CPT | Mod: 50

## 2019-02-21 PROCEDURE — 99214 OFFICE O/P EST MOD 30 MIN: CPT | Mod: 25

## 2019-02-22 PROBLEM — Z80.9 FAMILY HISTORY OF MALIGNANT NEOPLASM: Status: ACTIVE | Noted: 2019-02-21

## 2019-02-22 PROBLEM — Z87.39 HISTORY OF SPINAL STENOSIS: Status: RESOLVED | Noted: 2019-02-21 | Resolved: 2019-02-22

## 2019-02-22 PROBLEM — Z86.39 HISTORY OF HIGH CHOLESTEROL: Status: RESOLVED | Noted: 2019-02-21 | Resolved: 2019-02-22

## 2019-02-22 PROBLEM — Z85.9 HISTORY OF MALIGNANT NEOPLASM: Status: RESOLVED | Noted: 2019-02-21 | Resolved: 2019-02-22

## 2019-02-22 PROBLEM — Z87.39 HISTORY OF HERNIATED INTERVERTEBRAL DISC: Status: RESOLVED | Noted: 2019-02-21 | Resolved: 2019-02-22

## 2019-04-30 ENCOUNTER — MEDICATION RENEWAL (OUTPATIENT)
Age: 84
End: 2019-04-30

## 2019-05-29 ENCOUNTER — RX RENEWAL (OUTPATIENT)
Age: 84
End: 2019-05-29

## 2019-06-03 ENCOUNTER — RX RENEWAL (OUTPATIENT)
Age: 84
End: 2019-06-03

## 2019-06-06 ENCOUNTER — MEDICATION RENEWAL (OUTPATIENT)
Age: 84
End: 2019-06-06

## 2019-06-10 ENCOUNTER — OUTPATIENT (OUTPATIENT)
Dept: OUTPATIENT SERVICES | Facility: HOSPITAL | Age: 84
LOS: 1 days | Discharge: HOME | End: 2019-06-10

## 2019-06-10 DIAGNOSIS — Z98.49 CATARACT EXTRACTION STATUS, UNSPECIFIED EYE: Chronic | ICD-10-CM

## 2019-06-10 DIAGNOSIS — Z98.890 OTHER SPECIFIED POSTPROCEDURAL STATES: Chronic | ICD-10-CM

## 2019-06-10 DIAGNOSIS — Z90.89 ACQUIRED ABSENCE OF OTHER ORGANS: Chronic | ICD-10-CM

## 2019-06-10 DIAGNOSIS — D64.9 ANEMIA, UNSPECIFIED: ICD-10-CM

## 2019-06-13 ENCOUNTER — APPOINTMENT (OUTPATIENT)
Dept: HEMATOLOGY ONCOLOGY | Facility: CLINIC | Age: 84
End: 2019-06-13
Payer: MEDICARE

## 2019-06-13 VITALS
TEMPERATURE: 98 F | BODY MASS INDEX: 28.74 KG/M2 | WEIGHT: 162.26 LBS | OXYGEN SATURATION: 98 % | HEART RATE: 80 BPM | SYSTOLIC BLOOD PRESSURE: 181 MMHG | RESPIRATION RATE: 16 BRPM | DIASTOLIC BLOOD PRESSURE: 81 MMHG

## 2019-06-13 PROCEDURE — 99214 OFFICE O/P EST MOD 30 MIN: CPT

## 2019-06-13 NOTE — REASON FOR VISIT
[Follow-Up Visit] : a follow-up [Other: _____] : [unfilled] [FreeTextEntry2] : breast cancer, colorectal cancer

## 2019-06-13 NOTE — ASSESSMENT
[FreeTextEntry1] : #1) breast cancer-clinically MANAV. Patient consents to continue Aromasin-potential side effect profile reviewed.\par #2) colorectal cancer-Stage IIA-reviewed pathology. No plans for adjuvant therapy in this elderly female. Oncologic surveillance.\par Patient and her daughter were given the opportunity to ask questions. Their questions have been answered to their satisfaction at this time.

## 2019-06-13 NOTE — OB HISTORY
[Menarche Age: ____] : age at menarche was [unfilled] [Menopause Age: ____] : age at menopause was [unfilled] [Post-Menopause, No Sxs] : post-menopausal, currently without symptoms [___] : Total Pregnancies: [unfilled]

## 2019-06-13 NOTE — PHYSICAL EXAM
[Normal] : pharynx is unremarkable, moist mucus membrane, no oral lesions [de-identified] : Trace left ankle edema. No calf tenderness appreciated. [de-identified] : no vesicles [de-identified] : no dominant mass [de-identified] : alert and oriented x 3

## 2019-06-13 NOTE — REVIEW OF SYSTEMS
[Joint Pain] : joint pain [Negative] : Allergic/Immunologic [Loss of Hearing] : loss of hearing [FreeTextEntry9] : knee pain-not new

## 2019-06-13 NOTE — CONSULT LETTER
[Dear  ___] : Dear  [unfilled], [Courtesy Letter:] : I had the pleasure of seeing your patient, [unfilled], in my office today. [Please see my note below.] : Please see my note below. [Consult Closing:] : Thank you very much for allowing me to participate in the care of this patient.  If you have any questions, please do not hesitate to contact me. [Sincerely,] : Sincerely, [FreeTextEntry3] : Hortensia Curtis M.D.

## 2019-06-13 NOTE — HISTORY OF PRESENT ILLNESS
[Disease: _____________________] : Disease: [unfilled] [T: ___] : T[unfilled] [M: ___] : M[unfilled] [AJCC Stage: ____] : AJCC Stage: [unfilled] [de-identified] : S/P colorectal surgery followed by 2 1/2 months rehab. Overall, doing well currently. Eating well. No nausea/vomiting/abdominal pain. No blood per rectum or melena. Bowel movements regular.\par No complaints of chest pain, shortness of breath.\par \par \par \par  [de-identified] : Invasive well-differentiated duct carcinoma [de-identified] : History of right breast cancer 4/2005-Stage 1 (T1N0), ER positive, IN positive, HER-2 negative. Status post right breast conservation-->Arimidex begun in 6/2005, and continued for 5 years.\par Right breast cancer again in 3/2013-Stage 1 (T1bNx), ER positive, IN positive, HER-2 negative. Status post right breast conservation surgery--> Aromasin begun 5/2013.\par 7/2018-Colorectal cancer diagnosed. 10/2018-s/p rectosigmoid resection-Stage IIA -T3 N0 (22 LN's examined).

## 2019-08-19 ENCOUNTER — APPOINTMENT (OUTPATIENT)
Dept: ORTHOPEDIC SURGERY | Facility: CLINIC | Age: 84
End: 2019-08-19
Payer: MEDICARE

## 2019-08-19 PROCEDURE — 20610 DRAIN/INJ JOINT/BURSA W/O US: CPT | Mod: 50

## 2019-08-19 PROCEDURE — 99214 OFFICE O/P EST MOD 30 MIN: CPT | Mod: 25

## 2019-12-04 ENCOUNTER — OUTPATIENT (OUTPATIENT)
Dept: OUTPATIENT SERVICES | Facility: HOSPITAL | Age: 84
LOS: 1 days | Discharge: HOME | End: 2019-12-04

## 2019-12-04 DIAGNOSIS — Z90.89 ACQUIRED ABSENCE OF OTHER ORGANS: Chronic | ICD-10-CM

## 2019-12-04 DIAGNOSIS — Z98.890 OTHER SPECIFIED POSTPROCEDURAL STATES: Chronic | ICD-10-CM

## 2019-12-04 DIAGNOSIS — D64.9 ANEMIA, UNSPECIFIED: ICD-10-CM

## 2019-12-04 DIAGNOSIS — Z98.49 CATARACT EXTRACTION STATUS, UNSPECIFIED EYE: Chronic | ICD-10-CM

## 2020-01-12 ENCOUNTER — RX RENEWAL (OUTPATIENT)
Age: 85
End: 2020-01-12

## 2020-01-14 ENCOUNTER — OUTPATIENT (OUTPATIENT)
Dept: OUTPATIENT SERVICES | Facility: HOSPITAL | Age: 85
LOS: 1 days | Discharge: HOME | End: 2020-01-14

## 2020-01-14 DIAGNOSIS — Z90.89 ACQUIRED ABSENCE OF OTHER ORGANS: Chronic | ICD-10-CM

## 2020-01-14 DIAGNOSIS — Z98.49 CATARACT EXTRACTION STATUS, UNSPECIFIED EYE: Chronic | ICD-10-CM

## 2020-01-14 DIAGNOSIS — Z98.890 OTHER SPECIFIED POSTPROCEDURAL STATES: Chronic | ICD-10-CM

## 2020-01-14 DIAGNOSIS — D64.9 ANEMIA, UNSPECIFIED: ICD-10-CM

## 2020-01-16 ENCOUNTER — APPOINTMENT (OUTPATIENT)
Dept: HEMATOLOGY ONCOLOGY | Facility: CLINIC | Age: 85
End: 2020-01-16
Payer: MEDICARE

## 2020-01-16 VITALS
SYSTOLIC BLOOD PRESSURE: 145 MMHG | BODY MASS INDEX: 29.05 KG/M2 | HEART RATE: 68 BPM | RESPIRATION RATE: 16 BRPM | DIASTOLIC BLOOD PRESSURE: 80 MMHG | WEIGHT: 164 LBS | TEMPERATURE: 98.1 F | OXYGEN SATURATION: 98 %

## 2020-01-16 PROCEDURE — 99214 OFFICE O/P EST MOD 30 MIN: CPT

## 2020-01-16 RX ORDER — IPRATROPIUM BROMIDE 21 UG/1
0.03 SPRAY NASAL
Qty: 30 | Refills: 0 | Status: ACTIVE | COMMUNITY
Start: 2019-08-20

## 2020-01-16 NOTE — ASSESSMENT
[FreeTextEntry1] : #1) breast cancer-clinically MANAV. Discussed potential benefits, versus side effects of extended adjuvant endocrine therapy-patient wishes to continue Aromasin at this time.\par #2) colorectal cancer-clinically stable. Continue expectant surveillance. Further lab work/testing to be dictated by symptoms in this elderly female.\par Patient and her daughter were given the opportunity to ask questions. Their questions have been answered to their satisfaction at this time.

## 2020-01-16 NOTE — OB HISTORY
[Menarche Age: ____] : age at menarche was [unfilled] [Menopause Age: ____] : age at menopause was [unfilled] [Post-Menopause, No Sxs] : post-menopausal, currently without symptoms [___] : Living: [unfilled]

## 2020-01-16 NOTE — HISTORY OF PRESENT ILLNESS
[Disease: _____________________] : Disease: [unfilled] [T: ___] : T[unfilled] [M: ___] : M[unfilled] [AJCC Stage: ____] : AJCC Stage: [unfilled] [de-identified] : History of right breast cancer 4/2005-Stage 1 (T1N0), ER positive, AL positive, HER-2 negative. Status post right breast conservation-->Arimidex begun in 6/2005, and continued for 5 years.\par Right breast cancer again in 3/2013-Stage 1 (T1bNx), ER positive, AL positive, HER-2 negative. Status post right breast conservation surgery--> Aromasin begun 5/2013.\par 7/2018-Colorectal cancer diagnosed. 10/2018-s/p rectosigmoid resection-Stage IIA -T3 N0 (22 LN's examined). [de-identified] : Invasive well-differentiated duct carcinoma [de-identified] : +Knee arthralgias. Ambulates with a walker in the house. Has received cortisone injection.\par Generally otherwise doing well, though memory is decreased at times.\par No N/V/D or c/o abdominal/pelvic pain. No bleeding.\par No complaints of chest pain, shortness of breath.\par Daughter present.\par \par \par \par

## 2020-01-16 NOTE — PHYSICAL EXAM
[Normal] : affect appropriate [de-identified] : Trace left ankle edema. No calf tenderness appreciated. [de-identified] : no dominant mass [de-identified] : alert and oriented x 3 [de-identified] : NT [de-identified] : no vesicles; papery, dry

## 2020-01-16 NOTE — CONSULT LETTER
[Dear  ___] : Dear  [unfilled], [Consult Closing:] : Thank you very much for allowing me to participate in the care of this patient.  If you have any questions, please do not hesitate to contact me. [Sincerely,] : Sincerely, [Courtesy Letter:] : I had the pleasure of seeing your patient, [unfilled], in my office today. [Please see my note below.] : Please see my note below. [FreeTextEntry3] : Hortensia Curtis M.D.

## 2020-02-26 ENCOUNTER — APPOINTMENT (OUTPATIENT)
Dept: ORTHOPEDIC SURGERY | Facility: CLINIC | Age: 85
End: 2020-02-26
Payer: MEDICARE

## 2020-02-26 PROCEDURE — 99214 OFFICE O/P EST MOD 30 MIN: CPT | Mod: 25

## 2020-02-26 PROCEDURE — 20610 DRAIN/INJ JOINT/BURSA W/O US: CPT | Mod: 50

## 2020-02-26 PROCEDURE — 73562 X-RAY EXAM OF KNEE 3: CPT | Mod: 50

## 2020-04-07 ENCOUNTER — RX RENEWAL (OUTPATIENT)
Age: 85
End: 2020-04-07

## 2020-06-30 ENCOUNTER — RX RENEWAL (OUTPATIENT)
Age: 85
End: 2020-06-30

## 2020-07-16 ENCOUNTER — OUTPATIENT (OUTPATIENT)
Dept: OUTPATIENT SERVICES | Facility: HOSPITAL | Age: 85
LOS: 1 days | Discharge: HOME | End: 2020-07-16

## 2020-07-16 DIAGNOSIS — Z98.890 OTHER SPECIFIED POSTPROCEDURAL STATES: Chronic | ICD-10-CM

## 2020-07-16 DIAGNOSIS — Z98.49 CATARACT EXTRACTION STATUS, UNSPECIFIED EYE: Chronic | ICD-10-CM

## 2020-07-16 DIAGNOSIS — Z90.89 ACQUIRED ABSENCE OF OTHER ORGANS: Chronic | ICD-10-CM

## 2020-07-16 DIAGNOSIS — D64.9 ANEMIA, UNSPECIFIED: ICD-10-CM

## 2020-07-20 ENCOUNTER — APPOINTMENT (OUTPATIENT)
Dept: HEMATOLOGY ONCOLOGY | Facility: CLINIC | Age: 85
End: 2020-07-20
Payer: MEDICARE

## 2020-07-20 PROCEDURE — 99213 OFFICE O/P EST LOW 20 MIN: CPT | Mod: 95

## 2020-07-20 NOTE — ASSESSMENT
[FreeTextEntry1] : #1) breast cancer-clinically stable. Discussed potential benefits, versus side effects of extended adjuvant endocrine therapy-patient wishes to continue Aromasin at this time. She wishes to defer any f/u breast imaging unless related symptoms develop. \par #2) colorectal cancer-clinically stable. Continue expectant surveillance. F/U imaging to be dictated by symptoms in this elderly female.\par Patient and her daughter were given the opportunity to ask questions. Their questions have been answered to their satisfaction at this time.

## 2020-07-20 NOTE — CONSULT LETTER
[Dear  ___] : Dear  [unfilled], [Consult Closing:] : Thank you very much for allowing me to participate in the care of this patient.  If you have any questions, please do not hesitate to contact me. [Courtesy Letter:] : I had the pleasure of seeing your patient, [unfilled], in my office today. [Please see my note below.] : Please see my note below. [Sincerely,] : Sincerely, [FreeTextEntry3] : Hortensia Curtis MD

## 2020-07-20 NOTE — PHYSICAL EXAM
[Normal] : affect appropriate [de-identified] : breathing appeared unlabored [de-identified] : coloration appeared normal [de-identified] : awake, alert, responsive to questions appropriately

## 2020-07-20 NOTE — REASON FOR VISIT
[Home] : at home, [unfilled] , at the time of the visit. [Medical Office: ([facility])___] : at the medical office located in  [Verbal consent obtained from patient] : the patient, [unfilled] [Follow-Up Visit] : a follow-up [Other:____] : [unfilled] [FreeTextEntry2] : breast cancer

## 2020-08-10 ENCOUNTER — APPOINTMENT (OUTPATIENT)
Dept: ORTHOPEDIC SURGERY | Facility: CLINIC | Age: 85
End: 2020-08-10
Payer: MEDICARE

## 2020-08-10 VITALS — BODY MASS INDEX: 29.23 KG/M2 | WEIGHT: 165 LBS | HEIGHT: 63 IN

## 2020-08-10 PROCEDURE — 20610 DRAIN/INJ JOINT/BURSA W/O US: CPT | Mod: 50

## 2020-08-10 PROCEDURE — 99214 OFFICE O/P EST MOD 30 MIN: CPT | Mod: 25

## 2020-08-10 NOTE — DISCUSSION/SUMMARY
[de-identified] : The underlying pathophysiology was reviewed in great detail with the patient as well as the various treatment options, including ice, analgesics, NSAIDs, Physical therapy, steroid injections, hyaluronic gel injections, TKR \par \par Patient received bilateral corticosteroid injections today. \par \par Activity modifications and restrictions were discussed. I have recommended utilizing a knee sleeve to provide added support and stability.\par \par FU prn\par \par All questions were answered, all alternatives discussed and the patient is in complete agreement with that plan. Follow-up appointment as instructed. Any issues and the patient will call or come in sooner.

## 2020-08-10 NOTE — HISTORY OF PRESENT ILLNESS
[8] : a maximum pain level of 8/10 [de-identified] : JUSTYN PATINO is a 89 year female presenting to the office complaining of bilateral knee pain. She  presents to the office ambulating with with a walker with her daughter who is contributing to her medical history. Patient reports pain for approximately 6 years.  The patient has been diagnosed with bilateral advanced tricompartmental osteoarthritis, Denies injury or trauma to the area.  The patient describes the pain as a dull aching, and occasionally sharp pain localized to the anterolateral aspect of her bilateral knees that is intermittent and progressive in nature Her symptoms are exacerbated with walking or any activity that requires knees to move to point affecting her quality of life.   Pain is alleviated with rest.  Reports instability and  buckling. Pain in knees L>R, 7/10, constant, achy and sharp at times. She is wearing a knee sleeve on the left knee for support. Patient received a corticosteroid injection by Dr. Bingham on 02/26/2020 noting great relief of knee pain. Pain returned within a few months. Patient denies any fever, chills, trauma, swelling, erythema, hematomas, numbness or tingling sensation. Patient denies any other complaints at this time.

## 2020-09-27 ENCOUNTER — RX RENEWAL (OUTPATIENT)
Age: 85
End: 2020-09-27

## 2020-12-28 ENCOUNTER — RX RENEWAL (OUTPATIENT)
Age: 85
End: 2020-12-28

## 2021-01-20 ENCOUNTER — OUTPATIENT (OUTPATIENT)
Dept: OUTPATIENT SERVICES | Facility: HOSPITAL | Age: 86
LOS: 1 days | Discharge: HOME | End: 2021-01-20

## 2021-01-20 DIAGNOSIS — Z90.89 ACQUIRED ABSENCE OF OTHER ORGANS: Chronic | ICD-10-CM

## 2021-01-20 DIAGNOSIS — Z98.890 OTHER SPECIFIED POSTPROCEDURAL STATES: Chronic | ICD-10-CM

## 2021-01-20 DIAGNOSIS — Z98.49 CATARACT EXTRACTION STATUS, UNSPECIFIED EYE: Chronic | ICD-10-CM

## 2021-01-20 DIAGNOSIS — D64.9 ANEMIA, UNSPECIFIED: ICD-10-CM

## 2021-01-21 ENCOUNTER — APPOINTMENT (OUTPATIENT)
Dept: ORTHOPEDIC SURGERY | Facility: CLINIC | Age: 86
End: 2021-01-21
Payer: MEDICARE

## 2021-01-21 PROCEDURE — 20610 DRAIN/INJ JOINT/BURSA W/O US: CPT | Mod: 50

## 2021-01-21 PROCEDURE — 99213 OFFICE O/P EST LOW 20 MIN: CPT | Mod: 25

## 2021-01-21 NOTE — HISTORY OF PRESENT ILLNESS
[8] : a maximum pain level of 8/10 [de-identified] : JUSTYN PATINO is a 89 year female presenting to the office complaining of bilateral knee pain. She  presents to the office ambulating with with a walker with her daughter who is contributing to her medical history. Patient reports pain for approximately 6 years.  The patient has been diagnosed with bilateral advanced tricompartmental osteoarthritis, Denies injury or trauma to the area.  The patient describes the pain as a dull aching, and occasionally sharp pain localized to the anterolateral aspect of her bilateral knees that is intermittent and progressive in nature Her symptoms are exacerbated with walking or any activity that requires knees to move to point affecting her quality of life.   Pain is alleviated with rest.  Reports instability and  buckling. Pain in knees L>R, 7/10, constant, achy and sharp at times. She is wearing a knee sleeve on the left knee for support. Patient received a corticosteroid injection by Dr. Chester on 02/26/2020 noting great relief of knee pain. On 8/10/2020 patient received bilateral knee corticosteroid injections. Patient reports the pain returned a couple of months later and is not severe in nature.  Patient denies any fever, chills, trauma, swelling, erythema, hematomas, numbness or tingling sensation. Patient denies any other complaints at this time.

## 2021-01-21 NOTE — PROCEDURE
[de-identified] : At this point I recommended a therapeutic injection and under sterile precautions an injection of 5 cc 1% lidocaine with 0.5 cc of Kenalog and 0.5 cc of Dexamethasone - was placed into the joint of the Right knee without complication, and after several minutes, the patient felt significant relief.\par \par  At this point I recommended a therapeutic injection and under sterile precautions an injection of 5 cc 1% lidocaine with 0.5 cc of Kenalog and 0.5 cc of Dexamethasone - was placed into the joint of the Left knee without complication, and after several minutes, the patient felt significant relief.\par

## 2021-01-21 NOTE — DISCUSSION/SUMMARY
[de-identified] : The underlying pathophysiology was reviewed in great detail with the patient as well as the various treatment options, including ice, analgesics, NSAIDs, Physical therapy, steroid injections, hyaluronic gel injections, TKR \par \par Patient received bilateral corticosteroid injections today. \par \par Activity modifications and restrictions were discussed. I have recommended utilizing a knee sleeve to provide added support and stability.\par \par FU prn\par \par All questions were answered, all alternatives discussed and the patient is in complete agreement with that plan. Follow-up appointment as instructed. Any issues and the patient will call or come in sooner.

## 2021-01-21 NOTE — PHYSICAL EXAM
[de-identified] : Right Lower Extremity\par o Knee :\par ¦ Inspection/Palpation : medial and lateral tenderness to palpation, no swelling, no deformity valgus alignment, \par ¦ Range of Motion : 10 - 100 degrees, no crepitus\par ¦ Stability : no valgus or varus instability present on provocative testing, Lachman’s Test (-)\par o Muscle Bulk : normal muscle bulk present\par o Skin : no erythema, no ecchymosis\par o Sensation : sensation to pin intact\par o Vascular Exam : no edema, no cyanosis, dorsalis pedis artery pulse 2+, posterior tibial artery pulse 2+\par \par Left Lower Extremity\par o Knee :\par ¦ Inspection/Palpation : medial and lateral tenderness to palpation, no swelling, valgus alignment\par ¦ Range of Motion :10 -100 degrees, no crepitus\par ¦ Stability : no valgus or varus instability present on provocative testing, Lachman’s Test (-)\par o Muscle Bulk : normal muscle bulk present\par o Skin : no erythema, no ecchymosis\par o Sensation : sensation to pin intact\par o Vascular Exam : no edema, no cyanosis, dorsalis pedis artery pulse 2+, posterior tibial artery pulse 2+

## 2021-01-25 ENCOUNTER — APPOINTMENT (OUTPATIENT)
Dept: HEMATOLOGY ONCOLOGY | Facility: CLINIC | Age: 86
End: 2021-01-25
Payer: MEDICARE

## 2021-01-25 DIAGNOSIS — Z85.3 PERSONAL HISTORY OF MALIGNANT NEOPLASM OF BREAST: ICD-10-CM

## 2021-01-25 DIAGNOSIS — C19 MALIGNANT NEOPLASM OF RECTOSIGMOID JUNCTION: ICD-10-CM

## 2021-01-25 PROCEDURE — 99442: CPT | Mod: 95

## 2021-01-27 PROBLEM — C19 COLORECTAL CANCER: Status: ACTIVE | Noted: 2018-08-02

## 2021-01-27 NOTE — REVIEW OF SYSTEMS
[Loss of Hearing] : loss of hearing [Joint Pain] : joint pain [Negative] : Heme/Lymph [Fainting] : no fainting

## 2021-01-27 NOTE — ASSESSMENT
[FreeTextEntry1] : #1) breast cancer-clinically stable. Discussed potential benefits, versus side effects of extended adjuvant endocrine therapy-patient has now completed greater than 7-1/2 years of adjuvant aromatase inhibitor therapy-considering potential side effects in this elderly patient with history of arthritis, plan to hold further exemestane at this time. Oncologic surveillance.\par #2) colorectal cancer-clinically stable. However, slightly elevated CEA discussed–interval repeat CEA to be done. If continued increase/related symptoms develop, then would consider follow-up imaging. Supportive oncologic care favored in this 89-year-old patient.\par Patient and her daughter were given the opportunity to ask questions. Their questions have been answered to their apparent satisfaction at this time. They concur with plans of care.

## 2021-01-27 NOTE — RESULTS/DATA
[FreeTextEntry1] : 1/14/2021–hemoglobin 12.3, hematocrit 37.8, WBC 5.02, platelet count 282,000. CEA 5.1, creatinine 1.37, AST 18, ALT 12, alkaline phosphatase 84, calcium 9.8, total bilirubin 0.6, total protein 6.0.

## 2021-01-27 NOTE — HISTORY OF PRESENT ILLNESS
[Disease: _____________________] : Disease: [unfilled] [T: ___] : T[unfilled] [M: ___] : M[unfilled] [AJCC Stage: ____] : AJCC Stage: [unfilled] [de-identified] : History of right breast cancer 4/2005-Stage 1 (T1N0), ER positive, [place] positive, HER-2 negative. Status post right breast conservation-->Arimidex begun in 6/2005, and continued for 5 years.\par Right breast cancer again in 3/2013-Stage 1 (T1bNx), ER positive, MO positive, HER-2 negative. Status post right breast conservation surgery--> Aromasin begun 5/2013.\par 7/2018-Colorectal cancer diagnosed. 10/2018-s/p rectosigmoid resection-Stage IIA -T3 N0 (22 LN's examined). [de-identified] : Invasive well-differentiated duct carcinoma [de-identified] : Telephone visit due to COVID-19 pandemic (patient unable to do video). Daughter provided history/information for visit.\par Remains on Coumadin-getting monitored weekly by Dr. Pérez.\par +Knee arthralgias-has gotten steroid injections. Ambulates with a walker in the house.\par No N/V/D or c/o abdominal/pelvic pain. No bleeding.\par No complaints of chest pain, shortness of breath. No new breast pain or lumps reported.\par No fevers.\par

## 2021-01-27 NOTE — REASON FOR VISIT
[Home] : at home, [unfilled] , at the time of the visit. [Medical Office: ([facility])___] : at the medical office located in  [Other:____] : [unfilled] [Verbal consent obtained from patient] : the patient, [unfilled] [Follow-Up Visit] : a follow-up [FreeTextEntry2] : breast cancer

## 2021-03-26 NOTE — DISCHARGE NOTE ADULT - APPOINTMENTS. PLEASE FOLLOW UP WITH YOUR DOCTOR WITHIN 3 DAYS OF DISCHARGE TO SCHEDULE YOUR NEXT BLOOD TEST.
Wellness Visit for Adults   AMBULATORY CARE:   A wellness visit  is when you see your healthcare provider to get screened for health problems  Your healthcare provider will also give you advice on how to stay healthy  Write down your questions so you remember to ask them  Ask your healthcare provider how often you should have a wellness visit  What happens at a wellness visit:  Your healthcare provider will ask about your health, and your family history of health problems  This includes high blood pressure, heart disease, and cancer  He or she will ask if you have symptoms that concern you, if you smoke, and about your mood  You may also be asked about your intake of medicines, supplements, food, and alcohol  Any of the following may be done:  · Your weight  will be checked  Your height may also be checked so your body mass index (BMI) can be calculated  Your BMI shows if you are at a healthy weight  · Your blood pressure  and heart rate will be checked  Your temperature may also be checked  · Blood and urine tests  may be done  Blood tests may be done to check your cholesterol levels  Abnormal cholesterol levels increase your risk for heart disease and stroke  You may also need a blood or urine test to check for diabetes if you are at increased risk  Urine tests may be done to look for signs of an infection or kidney disease  · A physical exam  includes checking your heartbeat and lungs with a stethoscope  Your healthcare provider may also check your skin to look for sun damage  · Screening tests  may be recommended  A screening test is done to check for diseases that may not cause symptoms  The screening tests you may need depend on your age, gender, family history, and lifestyle habits  For example, colorectal screening may be recommended if you are 48years old or older  Screening tests you need if you are a woman:   · A Pap smear  is used to screen for cervical cancer   Pap smears are usually done every 3 to 5 years depending on your age  You may need them more often if you have had abnormal Pap smear test results in the past  Ask your healthcare provider how often you should have a Pap smear  · A mammogram  is an x-ray of your breasts to screen for breast cancer  Experts recommend mammograms every 2 years starting at age 48 years  You may need a mammogram at age 52 years or younger if you have an increased risk for breast cancer  Talk to your healthcare provider about when you should start having mammograms and how often you need them  Vaccines you may need:   · Get an influenza vaccine  every year  The influenza vaccine protects you from the flu  Several types of viruses cause the flu  The viruses change over time, so new vaccines are made each year  · Get a tetanus-diphtheria (Td) booster vaccine  every 10 years  This vaccine protects you against tetanus and diphtheria  Tetanus is a severe infection that may cause painful muscle spasms and lockjaw  Diphtheria is a severe bacterial infection that causes a thick covering in the back of your mouth and throat  · Get a human papillomavirus (HPV) vaccine  if you are female and aged 23 to 32 or male 23 to 24 and never received it  This vaccine protects you from HPV infection  HPV is the most common infection spread by sexual contact  HPV may also cause vaginal, penile, and anal cancers  · Get a pneumococcal vaccine  if you are aged 72 years or older  The pneumococcal vaccine is an injection given to protect you from pneumococcal disease  Pneumococcal disease is an infection caused by pneumococcal bacteria  The infection may cause pneumonia, meningitis, or an ear infection  · Get a shingles vaccine  if you are 60 or older, even if you have had shingles before  The shingles vaccine is an injection to protect you from the varicella-zoster virus  This is the same virus that causes chickenpox   Shingles is a painful rash that develops in people who had chickenpox or have been exposed to the virus  How to eat healthy:  My Plate is a model for planning healthy meals  It shows the types and amounts of foods that should go on your plate  Fruits and vegetables make up about half of your plate, and grains and protein make up the other half  A serving of dairy is included on the side of your plate  The amount of calories and serving sizes you need depends on your age, gender, weight, and height  Examples of healthy foods are listed below:  · Eat a variety of vegetables  such as dark green, red, and orange vegetables  You can also include canned vegetables low in sodium (salt) and frozen vegetables without added butter or sauces  · Eat a variety of fresh fruits , canned fruit in 100% juice, frozen fruit, and dried fruit  · Include whole grains  At least half of the grains you eat should be whole grains  Examples include whole-wheat bread, wheat pasta, brown rice, and whole-grain cereals such as oatmeal     · Eat a variety of protein foods such as seafood (fish and shellfish), lean meat, and poultry without skin (turkey and chicken)  Examples of lean meats include pork leg, shoulder, or tenderloin, and beef round, sirloin, tenderloin, and extra lean ground beef  Other protein foods include eggs and egg substitutes, beans, peas, soy products, nuts, and seeds  · Choose low-fat dairy products such as skim or 1% milk or low-fat yogurt, cheese, and cottage cheese  · Limit unhealthy fats  such as butter, hard margarine, and shortening  Exercise:  Exercise at least 30 minutes per day on most days of the week  Some examples of exercise include walking, biking, dancing, and swimming  You can also fit in more physical activity by taking the stairs instead of the elevator or parking farther away from stores  Include muscle strengthening activities 2 days each week  Regular exercise provides many health benefits   It helps you manage your weight, and decreases your risk for type 2 diabetes, heart disease, stroke, and high blood pressure  Exercise can also help improve your mood  Ask your healthcare provider about the best exercise plan for you  General health and safety guidelines:   · Do not smoke  Nicotine and other chemicals in cigarettes and cigars can cause lung damage  Ask your healthcare provider for information if you currently smoke and need help to quit  E-cigarettes or smokeless tobacco still contain nicotine  Talk to your healthcare provider before you use these products  · Limit alcohol  A drink of alcohol is 12 ounces of beer, 5 ounces of wine, or 1½ ounces of liquor  · Lose weight, if needed  Being overweight increases your risk of certain health conditions  These include heart disease, high blood pressure, type 2 diabetes, and certain types of cancer  · Protect your skin  Do not sunbathe or use tanning beds  Use sunscreen with a SPF 15 or higher  Apply sunscreen at least 15 minutes before you go outside  Reapply sunscreen every 2 hours  Wear protective clothing, hats, and sunglasses when you are outside  · Drive safely  Always wear your seatbelt  Make sure everyone in your car wears a seatbelt  A seatbelt can save your life if you are in an accident  Do not use your cell phone when you are driving  This could distract you and cause an accident  Pull over if you need to make a call or send a text message  · Practice safe sex  Use latex condoms if are sexually active and have more than one partner  Your healthcare provider may recommend screening tests for sexually transmitted infections (STIs)  · Wear helmets, lifejackets, and protective gear  Always wear a helmet when you ride a bike or motorcycle, go skiing, or play sports that could cause a head injury  Wear protective equipment when you play sports  Wear a lifejacket when you are on a boat or doing water sports      © Copyright [employer] 2020 Information is for End User's use only and may not be sold, redistributed or otherwise used for commercial purposes  All illustrations and images included in CareNotes® are the copyrighted property of A [employer] M [employer]  or Kaycee Chairez  The above information is an  only  It is not intended as medical advice for individual conditions or treatments  Talk to your doctor, nurse or pharmacist before following any medical regimen to see if it is safe and effective for you  Constipation   WHAT YOU NEED TO KNOW:   What is constipation? Constipation is when you have hard, dry bowel movements, or you go longer than usual between bowel movements  What causes constipation? · Not enough water or high-fiber foods    · Lack of physical activity    · Certain medicines, such as opioid pain medicine, antidepressants, or high blood pressure medicine    · A medical condition such as hemorrhoids, diabetes, or a stroke    What are the signs and symptoms of constipation? · Difficulty pushing out your bowel movement    · Pain or bleeding during your bowel movement    · A feeling that you did not finish having your bowel movement    · Nausea    · Bloating    · Headache    How is constipation treated? Medicine can help you have a bowel movement more easily  Medicines may increase moisture in your bowel movement or increase the motion of your intestines  · A suppository  may be used to help soften your bowel movements  This may make them easier to pass  A suppository is guided into your rectum through your anus  · Laxatives  can help stimulate your bowels to have a bowel movement  Your provider may recommend you only use laxatives for a short time  Long-term use may make your bowels dependent on the medicine  · An enema  is liquid medicine used to clear bowel movement from your rectum  The medicine is put into your rectum through your anus  What can I do to prevent constipation? · Drink liquids as directed  You may need to drink extra liquids to help soften and move your bowels  Ask how much liquid to drink each day and which liquids are best for you  · Eat high-fiber foods  This may help decrease constipation by adding bulk to your bowel movements  High-fiber foods include fruit, vegetables, whole-grain breads and cereals, and beans  Your healthcare provider or dietitian can help you create a high-fiber meal plan  Your provider may also recommend a fiber supplement if you cannot get enough fiber from food  · Exercise regularly  Regular physical activity can help stimulate your intestines  Walking is a good exercise to prevent or relieve constipation  Ask which exercises are best for you  · Schedule a time each day to have a bowel movement  This may help train your body to have regular bowel movements  Bend forward while you are on the toilet to help move the bowel movement out  Sit on the toilet for at least 10 minutes, even if you do not have a bowel movement  · Talk to your healthcare provider about your medicines  Certain medicines, such as opioids, can cause constipation  Your provider may be able to make medicine changes  For example, he or she may change the kind of medicine, or change when you take it  When should I call my doctor? · You have blood in your bowel movements  · You have a fever and abdominal pain with the constipation  · Your constipation gets worse  · You start to vomit  · You have questions or concerns about your condition or care  CARE AGREEMENT:   You have the right to help plan your care  Learn about your health condition and how it may be treated  Discuss treatment options with your healthcare providers to decide what care you want to receive  You always have the right to refuse treatment  The above information is an  only  It is not intended as medical advice for individual conditions or treatments   Talk to your doctor, nurse or pharmacist before following any medical regimen to see if it is safe and effective for you  © Copyright [address] Information is for End User's use only and may not be sold, redistributed or otherwise used for commercial purposes   All illustrations and images included in CareNotes® are the copyrighted property of A [employer] A M , Inc  or [address] [place] [address] Statement Selected

## 2021-03-29 ENCOUNTER — RX RENEWAL (OUTPATIENT)
Age: 86
End: 2021-03-29

## 2021-04-01 ENCOUNTER — INPATIENT (INPATIENT)
Facility: HOSPITAL | Age: 86
LOS: 3 days | Discharge: NURSING, REHAB OR LONG-TERM CARE | DRG: 563 | End: 2021-04-05
Attending: INTERNAL MEDICINE | Admitting: STUDENT IN AN ORGANIZED HEALTH CARE EDUCATION/TRAINING PROGRAM
Payer: MEDICARE

## 2021-04-01 VITALS
HEART RATE: 72 BPM | DIASTOLIC BLOOD PRESSURE: 80 MMHG | OXYGEN SATURATION: 98 % | SYSTOLIC BLOOD PRESSURE: 161 MMHG | HEIGHT: 62 IN | RESPIRATION RATE: 18 BRPM | WEIGHT: 130.07 LBS | TEMPERATURE: 98 F

## 2021-04-01 DIAGNOSIS — Z90.49 ACQUIRED ABSENCE OF OTHER SPECIFIED PARTS OF DIGESTIVE TRACT: Chronic | ICD-10-CM

## 2021-04-01 DIAGNOSIS — Z98.49 CATARACT EXTRACTION STATUS, UNSPECIFIED EYE: Chronic | ICD-10-CM

## 2021-04-01 DIAGNOSIS — Z98.890 OTHER SPECIFIED POSTPROCEDURAL STATES: Chronic | ICD-10-CM

## 2021-04-01 DIAGNOSIS — S42.291A OTHER DISPLACED FRACTURE OF UPPER END OF RIGHT HUMERUS, INITIAL ENCOUNTER FOR CLOSED FRACTURE: ICD-10-CM

## 2021-04-01 DIAGNOSIS — Z90.89 ACQUIRED ABSENCE OF OTHER ORGANS: Chronic | ICD-10-CM

## 2021-04-01 LAB
ALBUMIN SERPL ELPH-MCNC: 3.5 G/DL — SIGNIFICANT CHANGE UP (ref 3.3–5)
ALP SERPL-CCNC: 87 U/L — SIGNIFICANT CHANGE UP (ref 40–120)
ALT FLD-CCNC: 14 U/L — SIGNIFICANT CHANGE UP (ref 10–45)
ANION GAP SERPL CALC-SCNC: 10 MMOL/L — SIGNIFICANT CHANGE UP (ref 5–17)
APTT BLD: 36.5 SEC — HIGH (ref 27.5–35.5)
AST SERPL-CCNC: 16 U/L — SIGNIFICANT CHANGE UP (ref 10–40)
BASOPHILS # BLD AUTO: 0.05 K/UL — SIGNIFICANT CHANGE UP (ref 0–0.2)
BASOPHILS NFR BLD AUTO: 0.6 % — SIGNIFICANT CHANGE UP (ref 0–2)
BILIRUB SERPL-MCNC: 0.3 MG/DL — SIGNIFICANT CHANGE UP (ref 0.2–1.2)
BUN SERPL-MCNC: 26 MG/DL — HIGH (ref 7–23)
CALCIUM SERPL-MCNC: 9.6 MG/DL — SIGNIFICANT CHANGE UP (ref 8.4–10.5)
CHLORIDE SERPL-SCNC: 107 MMOL/L — SIGNIFICANT CHANGE UP (ref 96–108)
CO2 SERPL-SCNC: 22 MMOL/L — SIGNIFICANT CHANGE UP (ref 22–31)
CREAT SERPL-MCNC: 1.11 MG/DL — SIGNIFICANT CHANGE UP (ref 0.5–1.3)
EOSINOPHIL # BLD AUTO: 0.15 K/UL — SIGNIFICANT CHANGE UP (ref 0–0.5)
EOSINOPHIL NFR BLD AUTO: 1.8 % — SIGNIFICANT CHANGE UP (ref 0–6)
GLUCOSE SERPL-MCNC: 189 MG/DL — HIGH (ref 70–99)
HCT VFR BLD CALC: 39.5 % — SIGNIFICANT CHANGE UP (ref 34.5–45)
HGB BLD-MCNC: 12 G/DL — SIGNIFICANT CHANGE UP (ref 11.5–15.5)
IMM GRANULOCYTES NFR BLD AUTO: 0.4 % — SIGNIFICANT CHANGE UP (ref 0–1.5)
INR BLD: 4.17 RATIO — HIGH (ref 0.88–1.16)
LIDOCAIN IGE QN: 44 U/L — SIGNIFICANT CHANGE UP (ref 7–60)
LYMPHOCYTES # BLD AUTO: 0.91 K/UL — LOW (ref 1–3.3)
LYMPHOCYTES # BLD AUTO: 11 % — LOW (ref 13–44)
MCHC RBC-ENTMCNC: 26.5 PG — LOW (ref 27–34)
MCHC RBC-ENTMCNC: 30.4 GM/DL — LOW (ref 32–36)
MCV RBC AUTO: 87.2 FL — SIGNIFICANT CHANGE UP (ref 80–100)
MONOCYTES # BLD AUTO: 0.58 K/UL — SIGNIFICANT CHANGE UP (ref 0–0.9)
MONOCYTES NFR BLD AUTO: 7 % — SIGNIFICANT CHANGE UP (ref 2–14)
NEUTROPHILS # BLD AUTO: 6.55 K/UL — SIGNIFICANT CHANGE UP (ref 1.8–7.4)
NEUTROPHILS NFR BLD AUTO: 79.2 % — HIGH (ref 43–77)
NRBC # BLD: 0 /100 WBCS — SIGNIFICANT CHANGE UP (ref 0–0)
PLATELET # BLD AUTO: 304 K/UL — SIGNIFICANT CHANGE UP (ref 150–400)
POTASSIUM SERPL-MCNC: 4.1 MMOL/L — SIGNIFICANT CHANGE UP (ref 3.5–5.3)
POTASSIUM SERPL-SCNC: 4.1 MMOL/L — SIGNIFICANT CHANGE UP (ref 3.5–5.3)
PROT SERPL-MCNC: 6.2 G/DL — SIGNIFICANT CHANGE UP (ref 6–8.3)
PROTHROM AB SERPL-ACNC: 46.7 SEC — HIGH (ref 10.6–13.6)
RBC # BLD: 4.53 M/UL — SIGNIFICANT CHANGE UP (ref 3.8–5.2)
RBC # FLD: 15.5 % — HIGH (ref 10.3–14.5)
SARS-COV-2 RNA SPEC QL NAA+PROBE: SIGNIFICANT CHANGE UP
SODIUM SERPL-SCNC: 139 MMOL/L — SIGNIFICANT CHANGE UP (ref 135–145)
WBC # BLD: 8.27 K/UL — SIGNIFICANT CHANGE UP (ref 3.8–10.5)
WBC # FLD AUTO: 8.27 K/UL — SIGNIFICANT CHANGE UP (ref 3.8–10.5)

## 2021-04-01 PROCEDURE — 74177 CT ABD & PELVIS W/CONTRAST: CPT | Mod: 26,MA

## 2021-04-01 PROCEDURE — 72128 CT CHEST SPINE W/O DYE: CPT | Mod: 26,MA

## 2021-04-01 PROCEDURE — 72125 CT NECK SPINE W/O DYE: CPT | Mod: 26,MH

## 2021-04-01 PROCEDURE — 73060 X-RAY EXAM OF HUMERUS: CPT | Mod: 26,RT

## 2021-04-01 PROCEDURE — 71260 CT THORAX DX C+: CPT | Mod: 26,MA

## 2021-04-01 PROCEDURE — 93010 ELECTROCARDIOGRAM REPORT: CPT

## 2021-04-01 PROCEDURE — 72131 CT LUMBAR SPINE W/O DYE: CPT | Mod: 26

## 2021-04-01 PROCEDURE — 73502 X-RAY EXAM HIP UNI 2-3 VIEWS: CPT | Mod: 26,RT

## 2021-04-01 PROCEDURE — 71045 X-RAY EXAM CHEST 1 VIEW: CPT | Mod: 26

## 2021-04-01 PROCEDURE — 73030 X-RAY EXAM OF SHOULDER: CPT | Mod: 26,RT

## 2021-04-01 PROCEDURE — 70450 CT HEAD/BRAIN W/O DYE: CPT | Mod: 26,MH

## 2021-04-01 PROCEDURE — 73070 X-RAY EXAM OF ELBOW: CPT | Mod: 26,RT

## 2021-04-01 PROCEDURE — 99285 EMERGENCY DEPT VISIT HI MDM: CPT | Mod: CS

## 2021-04-01 RX ORDER — ACETAMINOPHEN 500 MG
975 TABLET ORAL ONCE
Refills: 0 | Status: COMPLETED | OUTPATIENT
Start: 2021-04-01 | End: 2021-04-01

## 2021-04-01 RX ORDER — TETANUS TOXOID, REDUCED DIPHTHERIA TOXOID AND ACELLULAR PERTUSSIS VACCINE, ADSORBED 5; 2.5; 8; 8; 2.5 [IU]/.5ML; [IU]/.5ML; UG/.5ML; UG/.5ML; UG/.5ML
0.5 SUSPENSION INTRAMUSCULAR ONCE
Refills: 0 | Status: COMPLETED | OUTPATIENT
Start: 2021-04-01 | End: 2021-04-01

## 2021-04-01 RX ADMIN — TETANUS TOXOID, REDUCED DIPHTHERIA TOXOID AND ACELLULAR PERTUSSIS VACCINE, ADSORBED 0.5 MILLILITER(S): 5; 2.5; 8; 8; 2.5 SUSPENSION INTRAMUSCULAR at 15:53

## 2021-04-01 RX ADMIN — Medication 975 MILLIGRAM(S): at 15:34

## 2021-04-01 NOTE — ED PROVIDER NOTE - SHIFT CHANGE DETAILS
Attending note (Roderick): I have endorsed this patient to the incoming physician, including clinical history, physical exam, current results and assessment/plan.

## 2021-04-01 NOTE — ED PROVIDER NOTE - PHYSICAL EXAMINATION
Skin: bruising to RUE with non healing skin break down  MSK RUE: TTP right humerus, 5/5 motor strength to right hand  Vasc: warm RUE, strong pulse   Neuro: b/l upper extremity sensation intact

## 2021-04-01 NOTE — ED ADULT NURSE NOTE - NSIMPLEMENTINTERV_GEN_ALL_ED
Implemented All Fall with Harm Risk Interventions:  [place] to call system. Call bell, personal items and telephone within reach. Instruct patient to call for assistance. Room bathroom lighting operational. Non-slip footwear when patient is off stretcher. Physically safe environment: no spills, clutter or unnecessary equipment. Stretcher in lowest position, wheels locked, appropriate side rails in place. Provide visual cue, wrist band, yellow gown, etc. Monitor gait and stability. Monitor for mental status changes and reorient to person, place, and time. Review medications for side effects contributing to fall risk. Reinforce activity limits and safety measures with patient and family. Provide visual clues: red socks.

## 2021-04-01 NOTE — ED PROVIDER NOTE - PMH
Basal cell carcinoma    Breast CA    DVT (deep venous thrombosis)  left leg  HLD (hyperlipidemia)    HTN (hypertension)    Macular degeneration

## 2021-04-01 NOTE — ED ADULT NURSE NOTE - OBJECTIVE STATEMENT
89 y/o female hx AS, DVT, HTN, breast CA, colon CA, presents to the ED via EMS from home c/o right arm/back pain s/p fall at home x 5 days ago. Pt states was trying to go to bathroom with help of daughter when slipped and fell onto side, PCP order home xray which showed humerus fx. Denies fever, chills, n/v, weakness, abd pain, diarrhea/constipation, numbness/tingling, urinary s/s. Pt A&Ox3, in no respiratory distress, no CP, ecchymosis to right back and RUE with skin breakdown, right arm tender to palpitation. PT safety maintained, call bell within reach and bed in the lowest position.

## 2021-04-01 NOTE — ED PROVIDER NOTE - ST/T WAVE
Ears: no ear pain and no hearing problems.Nose: no nasal congestion and no nasal drainage.Mouth/Throat: no dysphagia, no hoarseness and no throat pain.Neck: no lumps, no pain, no stiffness and no swollen glands.
No st elevations or depressions.  Grossly normal t wave morphology.

## 2021-04-01 NOTE — ED PROVIDER NOTE - CARE PLAN
Principal Discharge DX:	Other closed displaced fracture of proximal end of right humerus, initial encounter  Secondary Diagnosis:	Fall in home, initial encounter
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
## 2021-04-01 NOTE — ED PROVIDER NOTE - OBJECTIVE STATEMENT
89 y/o female PMHx LLE DVT currently on coumadin, HTN, HLD, Aortic Stenosis, breast cancer remission, colon cancer remission 2018 s/p resection now presenting to the ED via EMS s/p witnessed mechanical fall 5 days ago. Patient lives with daughter Gabi who is her primary care taker and daughter witnessed the fall in the bathroom while trying to get onto the toilet. Patient was reporting RUE pain/bruising and skin break down therefore PMD ordered at home XRAY which showed right humerus fracture. Per EMS daughter unable to take care of patient at home since she is elderly herself and is requiring more assistance. Patient denied CP, SOB, abdominal pain, back pain, N/V/D, pelvic pain, dizziness, syncope, palpitations, headache, confusion    PMD Dr. Abdulaziz Gonzalez

## 2021-04-01 NOTE — PATIENT PROFILE ADULT - DEAF OR HARD OF HEARING?
Provided patient with AVS and number to wound care clinic at [facility].    Patient verbalized understanding and appreciative.   no

## 2021-04-01 NOTE — ED PROVIDER NOTE - ATTENDING CONTRIBUTION TO CARE
Attending Statement (ROBERT Vaughn MD):    HPI: 89y/o F with h/o LLE DVT currently on coumadin, HTN, HLD, Aortic Stenosis, breast cancer remission, colon cancer remission 2018 s/p resection, who is BIBEMS s/p witnessed mechanical fall 5 days ago. Patient lives with daughter Gabi who is her primary care taker and daughter witnessed the fall in the bathroom while trying to get onto the toilet. Patient was reporting RUE pain/bruising and skin break down therefore PMD ordered at home XRAY which showed right humerus fracture. Per EMS daughter unable to take care of patient at home since she is elderly herself and is requiring more assistance. Patient denied CP, SOB, abdominal pain, back pain, N/V/D, pelvic pain, dizziness, syncope, palpitations, headache, confusion.     PMD Dr. Abdulaziz Gonzalez    Review of Systems:  -General: no fever or chills  -ENT: no congestion, no difficulty swallowing  -Pulmonary: no cough, no shortness of breath  -Cardiac: no chest pain, no palpitations  -Gastrointestinal: no abdominal pain, no nausea, no vomiting, and no diarrhea.  -Genitourinary: no blood or pain with urination  -Musculoskeletal: see hpi  -Skin: no rashes; abrasion to RUE  -Endocrine: No h/o diabetes or thyroid disease  -Neurologic: No focal weakness or numbness    All else negative unless otherwise specified elsewhere in this note.    PSH/PMH as noted above    On Physical Exam:  General: well appearing, in NAD, speaking clearly in full sentences and without difficulty; cooperative with exam  HEENT: PERRL, MMM  Neck: no neck tenderness, no nuchal rigidity  Cardiac: normal s1, s2; RRR; no MGR  Lungs: CTABL  Abdomen: soft nontender/nondistended  : no bladder tenderness or distension  Skin: RUE abrasions/ecchymoses as noted below.  Extensive R flank eccymoses  Back: R flank ecchymoses, tenderness over R flank, posterior lower ribs.  Extremities: no peripheral edema,  -RUE: ~2cm skin tear/abrasion to mid right arm; superficial, no visible bone, no active bleeding; extensive ecchymoses throughout humeral portion of RUE; sensation intact throughout RUE, deformity to upper humeral portion of RUE; cap refill in fingers < 2 sec, radial pulse 2+.  Neuro: no gross neurologic deficits    MDM: 89y/o F with h/o LLE DVT currently on coumadin, HTN, HLD, Aortic Stenosis; multiple cancers (in remission) presenting pain in R arm after fall 5 days ago; R flank ecchymoses, tenderness over R flank, posterior lower ribs, which raises concern for rib fractures, peritoneal/retroperitoneal bleeding (less likely given time since fall; however, concern remains based on exam findings, age and AC use).  Will obtain screening labs: cbc (to evaluate for leukocytosis or anemia), CMP (to evaluate for electrolyte abnormalities or renal/liver dysfunction), pt/inr (on coumadin); will obtain screening ECG.  Obtain CT head/c-spine/chest/abdomen w/ iv contrast for trauma/fall evaluation, as well as x-rays of R shoulder/humerus (suspected humerus fracture per report from patient/family/ems however no imaging available in EMR).  Likely to require admission given overall impression, daughter's inability to care for her and likely need for PT/OT.

## 2021-04-01 NOTE — CONSULT NOTE ADULT - SUBJECTIVE AND OBJECTIVE BOX
89yFemale c/o R shoulder pain  s/p mechanical fall 5 days ago. Patient denies head hit or LOC. Patient denies numbness or tingling in the RUE. Patient denies any other injuries. Brought to ED tonight as daughter could not care for the patient.    ROS: 10 point ROS otherwise negative    PMH:  HTN (hypertension)    DVT (deep venous thrombosis)    Macular degeneration    HLD (hyperlipidemia)    Breast CA    Basal cell carcinoma      PSH:  S/P lumpectomy of breast    S/P cataract extraction    S/P tonsillectomy    S/P colon resection      AH:    Meds: See med rec    T(C): 36.8 (04-01-21 @ 22:25)  HR: 61 (04-01-21 @ 22:25)  BP: 151/91 (04-01-21 @ 22:25)  RR: 16 (04-01-21 @ 22:25)  SpO2: 98% (04-01-21 @ 22:25)  Wt(kg): --    Gen: NAD  Resp: Unlabored breathing  PE RUE:  Skin intact,   Ecchymosis over posterior arm with small skin tear (covered with xerofrom)   SILT axillary/med/rad/ulnar  +Motor AIN/PIN/Ulnar/Radial/Musc/Median,   +painless elbow/wrist ROM,   shoulder ROM limited 2/2 pain,   2+radial pulse, soft compartments.    Secondary:  No TTP over bony landmarks, SILT BL, ROM intact BL, distal pulses palpable.    Imaging:  XR demonstrating right proximal humerus fracture        89yFemale with R proximal humerus fracture    pain control  NWB RUE in sling  PT/OT  No acute orthopaedic intervention  Ortho to sign off  Please call if you have further question  Can follow up with Dr. Varela in 1-2 weeks or upon discharge    Ortho

## 2021-04-01 NOTE — ED PROVIDER NOTE - PROGRESS NOTE DETAILS
Attending note (Roderick): spoke with katerin's daughter; patient normally uses wheelchair with assistance for transfer; but needs to take 2-3 steps on her own, supporting herself with arm; however, on saturday, slip/fall while trying to transfer to toilet and fell onto chair; no head trauma but hit right arm on chair.  small abrasion to the right arm, but next day noted increased bruising; she was able to get an xray at home today, which showed a humerus fracture (right arm); was called today and told by her PCP to take her to hospital. Daughter states she cannot care for her the past several days as she cannot lift her or help transfer her. Attending note (Roderick): spoke with katerin's daughter; patient normally uses wheelchair with assistance for transfer; but needs to take 2-3 steps on her own, supporting herself with arm; however, on saturday, slip/fall while trying to transfer to toilet and fell onto chair; no head trauma but hit right arm on chair.  small abrasion to the right arm, but next day noted increased bruising; she was able to get an xray at home today, which showed a humerus fracture (right arm); was called today and told by her PCP to take her to hospital. Daughter states she cannot care for her the past several days as she cannot lift her or help transfer her.  Daughter unsure of her last tetanus booster. Dr. Glez (Attending Physician)  Signed out to me. Proximal humerus fx will [facility] ortho. Admit medicine.

## 2021-04-02 DIAGNOSIS — R26.81 UNSTEADINESS ON FEET: ICD-10-CM

## 2021-04-02 DIAGNOSIS — Z29.9 ENCOUNTER FOR PROPHYLACTIC MEASURES, UNSPECIFIED: ICD-10-CM

## 2021-04-02 DIAGNOSIS — S42.309A UNSPECIFIED FRACTURE OF SHAFT OF HUMERUS, UNSPECIFIED ARM, INITIAL ENCOUNTER FOR CLOSED FRACTURE: ICD-10-CM

## 2021-04-02 DIAGNOSIS — I10 ESSENTIAL (PRIMARY) HYPERTENSION: ICD-10-CM

## 2021-04-02 DIAGNOSIS — Z86.718 PERSONAL HISTORY OF OTHER VENOUS THROMBOSIS AND EMBOLISM: ICD-10-CM

## 2021-04-02 DIAGNOSIS — H35.30 UNSPECIFIED MACULAR DEGENERATION: ICD-10-CM

## 2021-04-02 LAB
APTT BLD: 35.9 SEC — HIGH (ref 27.5–35.5)
FOLATE SERPL-MCNC: 11.3 NG/ML — SIGNIFICANT CHANGE UP
INR BLD: 3.57 RATIO — HIGH (ref 0.88–1.16)
PROTHROM AB SERPL-ACNC: 40.3 SEC — HIGH (ref 10.6–13.6)
VIT B12 SERPL-MCNC: 294 PG/ML — SIGNIFICANT CHANGE UP (ref 232–1245)

## 2021-04-02 PROCEDURE — 99223 1ST HOSP IP/OBS HIGH 75: CPT

## 2021-04-02 PROCEDURE — 73080 X-RAY EXAM OF ELBOW: CPT | Mod: 26,RT

## 2021-04-02 RX ORDER — COLESEVELAM HYDROCHLORIDE 625 MG/1
3 TABLET, FILM COATED ORAL
Qty: 0 | Refills: 0 | DISCHARGE

## 2021-04-02 RX ORDER — CHOLECALCIFEROL (VITAMIN D3) 125 MCG
1000 CAPSULE ORAL DAILY
Refills: 0 | Status: DISCONTINUED | OUTPATIENT
Start: 2021-04-02 | End: 2021-04-05

## 2021-04-02 RX ORDER — LATANOPROST 0.05 MG/ML
1 SOLUTION/ DROPS OPHTHALMIC; TOPICAL
Qty: 0 | Refills: 0 | DISCHARGE

## 2021-04-02 RX ORDER — ACETAMINOPHEN 500 MG
650 TABLET ORAL EVERY 6 HOURS
Refills: 0 | Status: DISCONTINUED | OUTPATIENT
Start: 2021-04-02 | End: 2021-04-05

## 2021-04-02 RX ORDER — EXEMESTANE 25 MG/1
1 TABLET, SUGAR COATED ORAL
Qty: 0 | Refills: 0 | DISCHARGE

## 2021-04-02 RX ORDER — ATENOLOL 25 MG/1
50 TABLET ORAL DAILY
Refills: 0 | Status: DISCONTINUED | OUTPATIENT
Start: 2021-04-02 | End: 2021-04-05

## 2021-04-02 RX ORDER — ENOXAPARIN SODIUM 100 MG/ML
40 INJECTION SUBCUTANEOUS DAILY
Refills: 0 | Status: DISCONTINUED | OUTPATIENT
Start: 2021-04-02 | End: 2021-04-04

## 2021-04-02 RX ADMIN — Medication 1000 UNIT(S): at 12:31

## 2021-04-02 RX ADMIN — ATENOLOL 50 MILLIGRAM(S): 25 TABLET ORAL at 09:14

## 2021-04-02 RX ADMIN — ENOXAPARIN SODIUM 40 MILLIGRAM(S): 100 INJECTION SUBCUTANEOUS at 12:30

## 2021-04-02 RX ADMIN — Medication 1 TABLET(S): at 12:30

## 2021-04-02 RX ADMIN — Medication 650 MILLIGRAM(S): at 10:53

## 2021-04-02 RX ADMIN — Medication 650 MILLIGRAM(S): at 11:27

## 2021-04-02 NOTE — H&P ADULT - NSICDXFAMILYHX_GEN_ALL_CORE_FT
FAMILY HISTORY:  Sibling  Still living? Unknown  FH: breast cancer, Age at diagnosis: Age Unknown

## 2021-04-02 NOTE — H&P ADULT - NSHPREVIEWOFSYSTEMS_GEN_ALL_CORE
REVIEW OF SYSTEMS  CONSTITUTIONAL: No fever, no chills, no fatigue  EYES: No eye pain, no vision changes  ENMT:  + difficulty hearing, no throat pain  RESPIRATORY: No cough, no sputum production; No shortness of breath  CARDIOVASCULAR: No chest pain, no palpitations, no leg swelling  GASTROINTESTINAL: No abdominal pain, no nausea, no vomiting  GENITOURINARY: No dysuria, no hematuria  NEUROLOGICAL: No headaches, no loss of strength,   SKIN: no rashes, no itching  MUSCULOSKELETAL: +R upper extremity pain with movement  HEME/LYMPH: no bleeding, +R upper extremity bruising,

## 2021-04-02 NOTE — H&P ADULT - NSHPLABSRESULTS_GEN_ALL_CORE
Labs, imaging  personally reviewed by me.     Hgb and WBC are WNL.   INR is elevated at 4.17. PT elevated at 46.7. PTT is elevated at 36.5.  CMP found SCr of 1.11.   COVID19 PCR is negative.    LABS:                        12.0   8.27  )-----------( 304      ( 01 Apr 2021 16:19 )             39.5     Hgb Trend: 12.0<--  04-01    139  |  107  |  26<H>  ----------------------------<  189<H>  4.1   |  22  |  1.11    Ca    9.6      01 Apr 2021 16:19    TPro  6.2  /  Alb  3.5  /  TBili  0.3  /  DBili  x   /  AST  16  /  ALT  14  /  AlkPhos  87  04-01    Creatinine Trend: 1.11<--  PT/INR - ( 01 Apr 2021 16:19 )   PT: 46.7 sec;   INR: 4.17 ratio    PTT - ( 01 Apr 2021 16:19 )  PTT:36.5 sec    Xray 2-3 view Right hip:    IMPRESSION:  There is no fracture or dislocation.  Mild right hip arthrosis.  No abnormal soft tissue swelling or mineralization.    Xray Elbow AP+ lateral, right:   IMPRESSION:  Comminuted fracture the proximal humerus involving at least the greater tuberosity and anatomic neck. Distal shaft fragment overriding and displaced laterally.  Glenohumeral osteoarthrosis. Limited evaluation of joint alignment.    Evaluation of the elbow is limited due to positioning. Question fracture of the right proximal radial head. Recommend repeat radiographs of the elbow.    Xray chest AP/PA- Impression: clear lungs    < from: CT Head No Cont (04.01.21 @ 19:25) >  IMPRESSION:    There is no evidence of skull fracture, intracranial hemorrhage or mass effect.  There is no acute displaced cervical spine fracture. Motion degraded study.    < from: CT Chest w/ IV Cont (04.01.21 @ 19:13) >    FINDINGS:  CHEST:  LUNGS AND LARGE AIRWAYS: Patent central airways. Bilateral lower lobe linear and basilar subsegmental atelectasis.  PLEWithin normal limits.ion.  VESSELS: No traumatic vascular injury. Aortic calcifications.  HEART: Heart size is normal. No pericardial effusion. Coronary artery calcifications  MEDIASTINUM AND LISA: No lymphadenWithin normal limits.  LOWER NECK: Left breast postsurgical changes    ABDOMEN AND PELVIS:  LIVER:Normal caliber.limits.  BILE DWithin normal limits.  GALLBLADDER: Cholelithiasis  SPLEEWithin normal limits.ts.  PANCREAWithin normal limits.ts.  ADRENALS: WithWithin normal limits.NEYS/URETERS: Indeterminate 4.0 cm hypodense left renal lesion, is unchanged in size dating back to 6/12/2018. Additional bilateral renal subcentimeter hypodensities, too small to characterize. No stones or hydronephrosis.    BLADDER: Under distended  REPRODUCTIVE ORGANS: Uterus and adnexa are within normal limits.    BOW Colorectal anastomosis. No bowel obstruction. Appendectomy.  PERITONEUM: Noascites.  VESSELS: No traumatic vascular injury.  Aortic calcifications. 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 Celiac, SMA, bilateral renal arteries, and VIKY are patent    RETROPERITONEUM/LYMPH NODES: No Within normal limits.  ABDOMINAL WALL: Within normal limits.  BONES: Impacted comminuted right proximal humeral fracture involving the greater tuberosity and anatomic neck. The right elbow, previously questioned, is not imaged. No other fracture or dislocation. Degenerative changes    IMPRESSION:  Impacted comminuted right proximal humeral fracture involving the greater tuberosity and anatomic neck.    No acute pathology within the chest abdomen or pelvis.    No traumatic vascular injury.    < end of copied text >

## 2021-04-02 NOTE — H&P ADULT - HISTORY OF PRESENT ILLNESS
This patient is a 89yo lady with PMH of breast cancer s/p resection and aromatase inhibitor use, colorectal cancer s/p rectosigmoid resection, LLE DVT on coumadin, htn, hLd, aortic stenosis, macular degeneration who presents to the ED With complaint of      This patient is a 89yo lady with PMH of breast cancer s/p resection and aromatase inhibitor use, colorectal cancer s/p rectosigmoid resection, LLE DVT on coumadin, htn, hLd, aortic stenosis, macular degeneration who presents to the ED with complaint of right arm pain sustained from a fall on 3/27/2021. The patient is very hard of hearing. History was provided by the patient's daughter, Gabi Jackson, over the phone. Gabi states that the patient has been using a walker until December 2020 and typically sleeps in a recliner. The patient has severely arthritic knees. In January 2021, her arthritis worsened with the cold weather. Gabi had acquired a wheelchair to assist the patient with getting to the restroom.  The patient had tried to maneuver from the wheelchair to her recliner by grabbing hold onto the back of a couch. She was able to walk a few steps, but her knee buckled and she toppled onto the recliner, hitting her right arm in the process.   That evening, she was able to go to sleep. An abrasion was noted at her right upper extremity. The following morning, the RUE appeared more swollen. Ice was applied and tylenol was given. The following day, the bruising appeared. Gabi notified the PMD, who ordered an xray which revealed R humerus fracture. EMS was called.     The patient denied any numbness in the extremities, but endorsed pain with movement of the right arm. She denied any hip pain. She also denied any dizziness, lightheadedness, chest pain, dyspnea or abdominal pain.

## 2021-04-02 NOTE — H&P ADULT - PROBLEM SELECTOR PLAN 1
Orthopedic surgery recommendations appreciated.  Continue NWB RUE with sling.  Pt should follow up with Dr. Coelho in 1-2 weeks.  Will start tylenol PRN for pain control.  Will obtain xray of elbow to assess R radial bone for fracture.   Apply ice PRN   PT and OT evaluation. Orthopedic surgery recommendations appreciated.  Continue NWB RUE with sling.  Pt should follow up with Dr. Varela in 1-2 weeks.  Will start tylenol PRN for pain control.  Will obtain xray of elbow to assess R radial bone for fracture.   Apply ice PRN   PT and OT evaluation.

## 2021-04-02 NOTE — H&P ADULT - PROBLEM SELECTOR PLAN 3
The patient had a fall 5 years ago and had a period of immobility, during which she developed a DVT. She had since been continued on coumadin for anticoagulation.   INR is supratherapeutic at 4.17. Coumadin dose on 4/1/2021 was held.  Recheck INR in AM.   Given the patient's age, and gait instability, continuing anticoagulation may place her at risk for bleeding. Discuss with PMD in AM details regarding the initial DVT to determine if maybe anticoagulation can be stopped.

## 2021-04-02 NOTE — OCCUPATIONAL THERAPY INITIAL EVALUATION ADULT - LIVES WITH, PROFILE
per EMR: Patient lives with daughter Gabi who is her primary care taker. patient normally uses wheelchair with assistance for transfer; but needs to take 2-3 steps on her own, supporting herself with arm.

## 2021-04-02 NOTE — PHYSICAL THERAPY INITIAL EVALUATION ADULT - ADDITIONAL COMMENTS
As per daughter Gabi, Patient lives with daughter Gabi who is her primary care taker. patient normally uses wheelchair with assistance for transfer; can take a few steps w/ RW. Needs assist w/ all functional & self care ADLs

## 2021-04-02 NOTE — H&P ADULT - PROBLEM SELECTOR PLAN 2
PT evaluation in AM.  Fall precautions.  Check B12 and folate.   Case management consult in AM for possible rehab placement.

## 2021-04-02 NOTE — H&P ADULT - NSHPSOCIALHISTORY_GEN_ALL_CORE
This patient is . She lives with her daughter.  She uses a wheelchair.   She denies alcohol or tobacco use.

## 2021-04-02 NOTE — H&P ADULT - NSICDXPASTSURGICALHX_GEN_ALL_CORE_FT
PAST SURGICAL HISTORY:  S/P cataract extraction     S/P colon resection     S/P lumpectomy of breast     S/P tonsillectomy

## 2021-04-02 NOTE — PHYSICAL THERAPY INITIAL EVALUATION ADULT - ACTIVE RANGE OF MOTION EXAMINATION, REHAB EVAL
R shlder not assessed 2/2 R elbow fx/bilateral upper extremity Active ROM was WFL (within functional limits)/bilateral  lower extremity Active ROM was WFL (within functional limits)

## 2021-04-02 NOTE — OCCUPATIONAL THERAPY INITIAL EVALUATION ADULT - PERTINENT HX OF CURRENT PROBLEM, REHAB EVAL
89yo lady with PMH of breast cancer s/p resection and aromatase inhibitor use, colorectal cancer s/p rectosigmoid resection, LLE DVT on coumadin, htn, hLd, aortic stenosis, macular degeneration who presents to the ED with complaint of right arm pain from humeral fracture sustained from a fall on 3/27/2021.

## 2021-04-02 NOTE — H&P ADULT - NSICDXPASTMEDICALHX_GEN_ALL_CORE_FT
PAST MEDICAL HISTORY:  Basal cell carcinoma     Breast CA     DVT (deep venous thrombosis) left leg    HLD (hyperlipidemia)     HTN (hypertension)     Macular degeneration

## 2021-04-02 NOTE — H&P ADULT - NSHPPHYSICALEXAM_GEN_ALL_CORE
T(C): 36.8 (04-01-21 @ 22:25), Max: 36.9 (04-01-21 @ 14:56)  HR: 61 (04-01-21 @ 22:25) (60 - 76)  BP: 151/91 (04-01-21 @ 22:25) (151/91 - 174/71)  RR: 16 (04-01-21 @ 22:25) (15 - 18)  SpO2: 98% (04-01-21 @ 22:25) (96% - 99%)  Wt(kg): --    PHYSICAL EXAM:  GENERAL: NAD, well-groomed, well-developed  HEAD:  Atraumatic, Normocephalic  EYES: EOMI, PERRLA, conjunctiva and sclera clear  ENMT: No oropharyngeal exudates, Moist mucous membranes  NECK: Supple, no cervical lymphadenopathy  NERVOUS SYSTEM:  Alert & Oriented X 2, CN II-XII intact, 5/5 BLE motor strength, right upper extremity strength not assessed due to pain, full sensation to light touch,   CHEST/LUNG: Clear to auscultation bilaterally anteriorly on auscultation; No rales, no rhonchi, no wheezing  HEART: Regular rate and rhythm; No murmurs  ABDOMEN: Soft, Nontender, Nondistended  EXTREMITIES:  2+ radial pulses, no cyanosis  MSK: + RUE tenderness to palpation and pain with movement  SKIN: warm, dry, + extensive bruising at right upper extremity and small bilateral lower extremity shin lacerations

## 2021-04-02 NOTE — PHYSICAL THERAPY INITIAL EVALUATION ADULT - PERTINENT HX OF CURRENT PROBLEM, REHAB EVAL
89yo lady with PMH of breast cancer s/p resection and aromatase inhibitor use, colorectal cancer s/p rectosigmoid resection, LLE DVT on coumadin, htn, hLd, aortic stenosis, macular degeneration who presents to the ED with complaint of right arm pain from humeral fracture sustained from a fall on 3/27/2021. (+)Xray R proximal humerus fracture

## 2021-04-02 NOTE — CHART NOTE - NSCHARTNOTEFT_GEN_A_CORE
Patient seen and examined at bedside.   GENERAL: NAD  EYES: conjunctiva and sclera clear  CHEST/LUNG: no wheezing noted   HEART: +S1/S2  ABDOMEN: Soft, Nontender, Nondistended  EXTREMITIES: RUE in sling, +2 R radial pulse.   PSYCH: Alert and awake, answering questions appropriately     -Continue with sling and conservative management of R humeral fx  -Elbow x-ray without any acute fractures  -F/.U B12 and folate  -INR improved to 3.57, can resume coumadin likely tomorrow  -PT eval recommending KIRA

## 2021-04-02 NOTE — H&P ADULT - ASSESSMENT
This patient is a 89yo lady with PMH of breast cancer s/p resection and aromatase inhibitor use, colorectal cancer s/p rectosigmoid resection, LLE DVT on coumadin, htn, hLd, aortic stenosis, macular degeneration who presents to the ED with complaint of right arm pain sustained from a fall on 3/27/2021. The patient is very hard of hearing. History was provided by the patient's daughter, Gabi Jackson, over the phone. Gabi states that the patient has been using a walker until December 2020 and typically sleeps in a recliner. The patient has severely arthritic knees. In January 2021, her arthritis worsened with the cold weather. Gabi had acquired a wheelchair to assist the patient with getting to the restroom.  The patient had tried to maneuver from the wheelchair to her recliner by grabbing hold onto the back of a couch. She was able to walk a few steps, but her knee buckled and she toppled onto the recliner, hitting her right arm in the process.   That evening, she was able to go to sleep. An abrasion was noted at her right upper extremity. The following morning, the RUE appeared more swollen. Ice was applied and tylenol was given. The following day, the bruising appeared. Gabi notified the PMD, who ordered an xray which revealed R humerus fracture. EMS was called.     The patient denied any numbness in the extremities, but endorsed pain with movement of the right arm. She denied any hip pain. She also denied any dizziness, lightheadedness, chest pain, dyspnea or abdominal pain.      This patient is a 89yo lady with PMH of breast cancer s/p resection and aromatase inhibitor use, colorectal cancer s/p rectosigmoid resection, LLE DVT on coumadin, htn, hLd, aortic stenosis, macular degeneration who presents to the ED with complaint of right arm pain from humeral fracture sustained from a fall on 3/27/2021.

## 2021-04-03 LAB
COVID-19 SPIKE DOMAIN AB INTERP: POSITIVE
COVID-19 SPIKE DOMAIN ANTIBODY RESULT: >250 U/ML — HIGH
HCT VFR BLD CALC: 39.8 % — SIGNIFICANT CHANGE UP (ref 34.5–45)
HGB BLD-MCNC: 12.3 G/DL — SIGNIFICANT CHANGE UP (ref 11.5–15.5)
INR BLD: 2.52 RATIO — HIGH (ref 0.88–1.16)
MCHC RBC-ENTMCNC: 26.7 PG — LOW (ref 27–34)
MCHC RBC-ENTMCNC: 30.9 GM/DL — LOW (ref 32–36)
MCV RBC AUTO: 86.3 FL — SIGNIFICANT CHANGE UP (ref 80–100)
NRBC # BLD: 0 /100 WBCS — SIGNIFICANT CHANGE UP (ref 0–0)
PLATELET # BLD AUTO: 316 K/UL — SIGNIFICANT CHANGE UP (ref 150–400)
PROTHROM AB SERPL-ACNC: 28.9 SEC — HIGH (ref 10.6–13.6)
RBC # BLD: 4.61 M/UL — SIGNIFICANT CHANGE UP (ref 3.8–5.2)
RBC # FLD: 15.3 % — HIGH (ref 10.3–14.5)
SARS-COV-2 IGG+IGM SERPL QL IA: >250 U/ML — HIGH
SARS-COV-2 IGG+IGM SERPL QL IA: POSITIVE
WBC # BLD: 5.74 K/UL — SIGNIFICANT CHANGE UP (ref 3.8–10.5)
WBC # FLD AUTO: 5.74 K/UL — SIGNIFICANT CHANGE UP (ref 3.8–10.5)

## 2021-04-03 PROCEDURE — 99232 SBSQ HOSP IP/OBS MODERATE 35: CPT

## 2021-04-03 RX ORDER — WARFARIN SODIUM 2.5 MG/1
4 TABLET ORAL ONCE
Refills: 0 | Status: COMPLETED | OUTPATIENT
Start: 2021-04-03 | End: 2021-04-03

## 2021-04-03 RX ADMIN — WARFARIN SODIUM 4 MILLIGRAM(S): 2.5 TABLET ORAL at 21:41

## 2021-04-03 RX ADMIN — ENOXAPARIN SODIUM 40 MILLIGRAM(S): 100 INJECTION SUBCUTANEOUS at 11:39

## 2021-04-03 RX ADMIN — ATENOLOL 50 MILLIGRAM(S): 25 TABLET ORAL at 08:41

## 2021-04-03 RX ADMIN — Medication 650 MILLIGRAM(S): at 12:31

## 2021-04-03 RX ADMIN — Medication 650 MILLIGRAM(S): at 11:39

## 2021-04-03 RX ADMIN — Medication 1000 UNIT(S): at 11:39

## 2021-04-03 RX ADMIN — Medication 1 TABLET(S): at 11:40

## 2021-04-03 NOTE — PROVIDER CONTACT NOTE (OTHER) - ACTION/TREATMENT ORDERED:
Notified PA of situation, ordered to reschedule med to 8:00am. Will complete. Will continue to monitor.

## 2021-04-03 NOTE — PROVIDER CONTACT NOTE (OTHER) - SITUATION
Pt has 50mg of Atenolol due. HR is 60 and is below the set parameter. BP is 174/78, pt is asymptomatic.

## 2021-04-04 LAB
ANION GAP SERPL CALC-SCNC: 12 MMOL/L — SIGNIFICANT CHANGE UP (ref 5–17)
BUN SERPL-MCNC: 22 MG/DL — SIGNIFICANT CHANGE UP (ref 7–23)
CALCIUM SERPL-MCNC: 10.1 MG/DL — SIGNIFICANT CHANGE UP (ref 8.4–10.5)
CHLORIDE SERPL-SCNC: 106 MMOL/L — SIGNIFICANT CHANGE UP (ref 96–108)
CO2 SERPL-SCNC: 23 MMOL/L — SIGNIFICANT CHANGE UP (ref 22–31)
CREAT SERPL-MCNC: 1.24 MG/DL — SIGNIFICANT CHANGE UP (ref 0.5–1.3)
GLUCOSE SERPL-MCNC: 126 MG/DL — HIGH (ref 70–99)
HCT VFR BLD CALC: 39.6 % — SIGNIFICANT CHANGE UP (ref 34.5–45)
HGB BLD-MCNC: 12.3 G/DL — SIGNIFICANT CHANGE UP (ref 11.5–15.5)
INR BLD: 1.64 RATIO — HIGH (ref 0.88–1.16)
MCHC RBC-ENTMCNC: 27.5 PG — SIGNIFICANT CHANGE UP (ref 27–34)
MCHC RBC-ENTMCNC: 31.1 GM/DL — LOW (ref 32–36)
MCV RBC AUTO: 88.6 FL — SIGNIFICANT CHANGE UP (ref 80–100)
NRBC # BLD: 0 /100 WBCS — SIGNIFICANT CHANGE UP (ref 0–0)
PLATELET # BLD AUTO: 313 K/UL — SIGNIFICANT CHANGE UP (ref 150–400)
POTASSIUM SERPL-MCNC: 4.4 MMOL/L — SIGNIFICANT CHANGE UP (ref 3.5–5.3)
POTASSIUM SERPL-SCNC: 4.4 MMOL/L — SIGNIFICANT CHANGE UP (ref 3.5–5.3)
PROTHROM AB SERPL-ACNC: 19.2 SEC — HIGH (ref 10.6–13.6)
RBC # BLD: 4.47 M/UL — SIGNIFICANT CHANGE UP (ref 3.8–5.2)
RBC # FLD: 15.7 % — HIGH (ref 10.3–14.5)
SARS-COV-2 RNA SPEC QL NAA+PROBE: SIGNIFICANT CHANGE UP
SODIUM SERPL-SCNC: 141 MMOL/L — SIGNIFICANT CHANGE UP (ref 135–145)
WBC # BLD: 7.64 K/UL — SIGNIFICANT CHANGE UP (ref 3.8–10.5)
WBC # FLD AUTO: 7.64 K/UL — SIGNIFICANT CHANGE UP (ref 3.8–10.5)

## 2021-04-04 PROCEDURE — 99232 SBSQ HOSP IP/OBS MODERATE 35: CPT

## 2021-04-04 RX ORDER — WARFARIN SODIUM 2.5 MG/1
4 TABLET ORAL ONCE
Refills: 0 | Status: DISCONTINUED | OUTPATIENT
Start: 2021-04-04 | End: 2021-04-04

## 2021-04-04 RX ORDER — WARFARIN SODIUM 2.5 MG/1
4 TABLET ORAL ONCE
Refills: 0 | Status: COMPLETED | OUTPATIENT
Start: 2021-04-04 | End: 2021-04-04

## 2021-04-04 RX ADMIN — Medication 1 TABLET(S): at 13:19

## 2021-04-04 RX ADMIN — Medication 650 MILLIGRAM(S): at 13:23

## 2021-04-04 RX ADMIN — ATENOLOL 50 MILLIGRAM(S): 25 TABLET ORAL at 05:47

## 2021-04-04 RX ADMIN — Medication 650 MILLIGRAM(S): at 14:00

## 2021-04-04 RX ADMIN — Medication 1000 UNIT(S): at 13:19

## 2021-04-04 RX ADMIN — WARFARIN SODIUM 4 MILLIGRAM(S): 2.5 TABLET ORAL at 22:05

## 2021-04-05 ENCOUNTER — TRANSCRIPTION ENCOUNTER (OUTPATIENT)
Age: 86
End: 2021-04-05

## 2021-04-05 VITALS
HEART RATE: 63 BPM | DIASTOLIC BLOOD PRESSURE: 73 MMHG | TEMPERATURE: 98 F | OXYGEN SATURATION: 97 % | SYSTOLIC BLOOD PRESSURE: 148 MMHG | RESPIRATION RATE: 18 BRPM

## 2021-04-05 LAB
HCT VFR BLD CALC: 40.4 % — SIGNIFICANT CHANGE UP (ref 34.5–45)
HGB BLD-MCNC: 12.3 G/DL — SIGNIFICANT CHANGE UP (ref 11.5–15.5)
MCHC RBC-ENTMCNC: 27 PG — SIGNIFICANT CHANGE UP (ref 27–34)
MCHC RBC-ENTMCNC: 30.4 GM/DL — LOW (ref 32–36)
MCV RBC AUTO: 88.8 FL — SIGNIFICANT CHANGE UP (ref 80–100)
NRBC # BLD: 0 /100 WBCS — SIGNIFICANT CHANGE UP (ref 0–0)
PLATELET # BLD AUTO: 327 K/UL — SIGNIFICANT CHANGE UP (ref 150–400)
RBC # BLD: 4.55 M/UL — SIGNIFICANT CHANGE UP (ref 3.8–5.2)
RBC # FLD: 15.8 % — HIGH (ref 10.3–14.5)
WBC # BLD: 6.89 K/UL — SIGNIFICANT CHANGE UP (ref 3.8–10.5)
WBC # FLD AUTO: 6.89 K/UL — SIGNIFICANT CHANGE UP (ref 3.8–10.5)

## 2021-04-05 PROCEDURE — 73070 X-RAY EXAM OF ELBOW: CPT

## 2021-04-05 PROCEDURE — 80048 BASIC METABOLIC PNL TOTAL CA: CPT

## 2021-04-05 PROCEDURE — 97161 PT EVAL LOW COMPLEX 20 MIN: CPT

## 2021-04-05 PROCEDURE — 90471 IMMUNIZATION ADMIN: CPT

## 2021-04-05 PROCEDURE — 73502 X-RAY EXAM HIP UNI 2-3 VIEWS: CPT

## 2021-04-05 PROCEDURE — 82746 ASSAY OF FOLIC ACID SERUM: CPT

## 2021-04-05 PROCEDURE — 72125 CT NECK SPINE W/O DYE: CPT

## 2021-04-05 PROCEDURE — 85025 COMPLETE CBC W/AUTO DIFF WBC: CPT

## 2021-04-05 PROCEDURE — 83690 ASSAY OF LIPASE: CPT

## 2021-04-05 PROCEDURE — 85027 COMPLETE CBC AUTOMATED: CPT

## 2021-04-05 PROCEDURE — 73060 X-RAY EXAM OF HUMERUS: CPT

## 2021-04-05 PROCEDURE — 99285 EMERGENCY DEPT VISIT HI MDM: CPT

## 2021-04-05 PROCEDURE — 74177 CT ABD & PELVIS W/CONTRAST: CPT

## 2021-04-05 PROCEDURE — 97535 SELF CARE MNGMENT TRAINING: CPT

## 2021-04-05 PROCEDURE — 82607 VITAMIN B-12: CPT

## 2021-04-05 PROCEDURE — 80053 COMPREHEN METABOLIC PANEL: CPT

## 2021-04-05 PROCEDURE — 71260 CT THORAX DX C+: CPT

## 2021-04-05 PROCEDURE — 86769 SARS-COV-2 COVID-19 ANTIBODY: CPT

## 2021-04-05 PROCEDURE — 97166 OT EVAL MOD COMPLEX 45 MIN: CPT

## 2021-04-05 PROCEDURE — U0003: CPT

## 2021-04-05 PROCEDURE — 70450 CT HEAD/BRAIN W/O DYE: CPT

## 2021-04-05 PROCEDURE — 85610 PROTHROMBIN TIME: CPT

## 2021-04-05 PROCEDURE — 71045 X-RAY EXAM CHEST 1 VIEW: CPT

## 2021-04-05 PROCEDURE — 90715 TDAP VACCINE 7 YRS/> IM: CPT

## 2021-04-05 PROCEDURE — U0005: CPT

## 2021-04-05 PROCEDURE — 73080 X-RAY EXAM OF ELBOW: CPT

## 2021-04-05 PROCEDURE — 99239 HOSP IP/OBS DSCHRG MGMT >30: CPT

## 2021-04-05 PROCEDURE — 85730 THROMBOPLASTIN TIME PARTIAL: CPT

## 2021-04-05 PROCEDURE — 73030 X-RAY EXAM OF SHOULDER: CPT

## 2021-04-05 RX ORDER — COLESEVELAM HYDROCHLORIDE 625 MG/1
1 TABLET, FILM COATED ORAL
Qty: 0 | Refills: 0 | DISCHARGE

## 2021-04-05 RX ORDER — ATENOLOL 25 MG/1
1 TABLET ORAL
Qty: 0 | Refills: 0 | DISCHARGE

## 2021-04-05 RX ORDER — CHOLECALCIFEROL (VITAMIN D3) 125 MCG
1000 CAPSULE ORAL
Qty: 0 | Refills: 0 | DISCHARGE
Start: 2021-04-05

## 2021-04-05 RX ADMIN — Medication 1000 UNIT(S): at 11:41

## 2021-04-05 RX ADMIN — Medication 1 TABLET(S): at 11:41

## 2021-04-05 RX ADMIN — ATENOLOL 50 MILLIGRAM(S): 25 TABLET ORAL at 05:27

## 2021-04-05 NOTE — DISCHARGE NOTE PROVIDER - NSDCCPCAREPLAN_GEN_ALL_CORE_FT
PRINCIPAL DISCHARGE DIAGNOSIS  Diagnosis: Other closed displaced fracture of proximal end of right humerus, initial encounter  Assessment and Plan of Treatment: Problem: Humerus fracture.  Plan: Orthopedic surgery recommendations appreciated.  Continue NWB RUE with sling.  Pt should follow up with Dr. Varela in 1-2 weeks.  Will start tylenol PRN for pain control.  Will obtain xray of elbow to assess R radial bone for fracture.   Apply ice PRN   PT and OT evaluation and recommended KIRA      SECONDARY DISCHARGE DIAGNOSES  Diagnosis: History of DVT (deep vein thrombosis)  Assessment and Plan of Treatment: ·  Problem: History of DVT (deep vein thrombosis).  Plan: -The patient had a fall 5 years ago and had a period of immobility, during which she developed a DVT. She had since been continued on coumadin for anticoagulation.   C/W coumadin while inpatient, will dose today. Check INR daily.   Given the patient's age, and gait instability, continuing anticoagulation may place her at risk for bleeding, would weigh risks and benefits with patient and family for discharging on AC.       Diagnosis: Gait instability  Assessment and Plan of Treatment: ·  Problem: Gait instability.  Plan: Fall precautions   B12 and folate WNL   KIRA for strengthning and deconditioning    Diagnosis: Fall in home, initial encounter  Assessment and Plan of Treatment:      PRINCIPAL DISCHARGE DIAGNOSIS  Diagnosis: Other closed displaced fracture of proximal end of right humerus, initial encounter  Assessment and Plan of Treatment: Problem: Humerus fracture.  Plan: Orthopedic surgery recommendations appreciated.  Continue NWB RUE with sling.  Pt should follow up with Dr. Varela in 1-2 weeks.  Will start tylenol PRN for pain control.  Will obtain xray of elbow to assess R radial bone for fracture.   Apply ice PRN   PT and OT evaluation and recommended [facility]IRA      SECONDARY DISCHARGE DIAGNOSES  Diagnosis: Hypertension  Assessment and Plan of Treatment: c/w atenolol 25 mgs daily and add norvasc 5 mgs daily, EKG show bradycardia with prolonged GA interval so atneolol dose reduced to 25 mgs    Diagnosis: History of DVT (deep vein thrombosis)  Assessment and Plan of Treatment: ·  Problem: History of DVT (deep vein thrombosis).  Plan: -The patient had a fall 5 years ago and had a period of immobility, during which she developed a DVT. She had since been continued on coumadin for anticoagulation.   C/W coumadin while inpatient, will dose today. Check INR daily.   Given the patient's age, and gait instability, continuing anticoagulation may place her at risk for bleeding, would weigh risks and benefits with patient and family for discharging on AC.       Diagnosis: Gait instability  Assessment and Plan of Treatment: ·  Problem: Gait instability.  Plan: Fall precautions   B12 and folate WNL   KIRA for strengthning and deconditioning    Diagnosis: Fall in home, initial encounter  Assessment and Plan of Treatment:

## 2021-04-05 NOTE — PROGRESS NOTE ADULT - SUBJECTIVE AND OBJECTIVE BOX
Héctor Ayala M.D.  Division of Hospital Medicine  TEAMS or Pager: [phone]    Patient is a 89y old  Female who presents with a chief complaint of Right arm pain (03 Apr 2021 13:15)    SUBJECTIVE / OVERNIGHT EVENTS: Patient seen and examined. No acute overnight events, no subjective complaints.    OBJECTIVE:  Vital Signs Last 24 Hrs  T(F): 98.4 (04 Apr 2021 08:50), Max: 98.4 (04 Apr 2021 08:50)  HR: 51 (04 Apr 2021 08:50) (51 - 69)  BP: 168/81 (04 Apr 2021 08:50) (153/75 - 168/81)  RR: 18 (04 Apr 2021 08:50) (18 - 18)  SpO2: 99% (04 Apr 2021 08:50) (97% - 99%)    I&O's Summary  03 Apr 2021 07:01  -  04 Apr 2021 07:00  --------------------------------------------------------  IN: 200 mL / OUT: 900 mL / NET: -700 mL    Physical Examination:  GEN: elderly woman, laying in bed in NAD  PSYCH: A&Ox1-2, mood and affect appear appropriate   NEURO: no focal neurologic deficits appreciated  NECK: supple, no e/o elevated JVP  RESPI: no accessory muscle use, B/L air entry, CTAB   CARDIO: regular rate/rhythm, no LE edema B/L  ABD: soft, NT, ND, +BS  EXT: RUE in sling  VASC: peripheral pulses palpated    Labs:                      12.3   7.64  )-----------( 313      ( 04 Apr 2021 06:55 )             39.6     04-04  141  |  106  |  22  ----------------------------<  126<H>  4.4   |  23  |  1.24    Ca    10.1      04 Apr 2021 06:55    PT/INR - ( 04 Apr 2021 06:55 )   PT: 19.2 sec;   INR: 1.64 ratio      Care Discussed with Consultants/Other Providers: ACP Rich    MEDICATIONS  (STANDING):  ATENolol  Tablet 50 milliGRAM(s) Oral daily  cholecalciferol 1000 Unit(s) Oral daily  multivitamin 1 Tablet(s) Oral daily  warfarin 4 milliGRAM(s) Oral once    MEDICATIONS  (PRN):  acetaminophen   Tablet .. 650 milliGRAM(s) Oral every 6 hours PRN Temp greater or equal to 38C (100.4F), Mild Pain (1 - 3), Moderate Pain (4 - 6)
Patient is a 89y old  Female who presents with a chief complaint of Right arm pain (02 Apr 2021 02:14)    SUBJECTIVE / OVERNIGHT EVENTS: no acute events overnight     MEDICATIONS  (STANDING):  ATENolol  Tablet 50 milliGRAM(s) Oral daily  cholecalciferol 1000 Unit(s) Oral daily  enoxaparin Injectable 40 milliGRAM(s) SubCutaneous daily  multivitamin 1 Tablet(s) Oral daily  warfarin 4 milliGRAM(s) Oral once    MEDICATIONS  (PRN):  acetaminophen   Tablet .. 650 milliGRAM(s) Oral every 6 hours PRN Temp greater or equal to 38C (100.4F), Mild Pain (1 - 3), Moderate Pain (4 - 6)      Vital Signs Last 24 Hrs  T(C): 36.5 (03 Apr 2021 08:52), Max: 36.6 (02 Apr 2021 16:03)  T(F): 97.7 (03 Apr 2021 08:52), Max: 97.9 (02 Apr 2021 16:03)  HR: 74 (03 Apr 2021 08:52) (53 - 74)  BP: 148/74 (03 Apr 2021 09:30) (127/63 - 184/91)  BP(mean): --  RR: 18 (03 Apr 2021 08:52) (18 - 18)  SpO2: 98% (03 Apr 2021 08:52) (95% - 98%)  CAPILLARY BLOOD GLUCOSE      I&O's Summary    02 Apr 2021 07:01  -  03 Apr 2021 07:00  --------------------------------------------------------  IN: 240 mL / OUT: 750 mL / NET: -510 mL      PHYSICAL EXAM:  GENERAL: NAD  EYES: conjunctiva and sclera clear  CHEST/LUNG: no wheezing noted   HEART: +S1/S2   ABDOMEN: Soft, Nontender, Nondistended  EXTREMITIES: RUE in sling   PSYCH: AAOx2    LABS:                        12.3   5.74  )-----------( 316      ( 03 Apr 2021 06:23 )             39.8     04-01    139  |  107  |  26<H>  ----------------------------<  189<H>  4.1   |  22  |  1.11    Ca    9.6      01 Apr 2021 16:19    TPro  6.2  /  Alb  3.5  /  TBili  0.3  /  DBili  x   /  AST  16  /  ALT  14  /  AlkPhos  87  04-01    PT/INR - ( 03 Apr 2021 06:34 )   PT: 28.9 sec;   INR: 2.52 ratio         PTT - ( 02 Apr 2021 10:35 )  PTT:35.9 sec      
Patient is a 89y old  Female who presents with a chief complaint of Right arm pain (05 Apr 2021 12:26)      SUBJECTIVE / OVERNIGHT EVENTS: Pt in bed, denies pain.     Vital Signs Last 24 Hrs  T(C): 36.4 (05 Apr 2021 08:30), Max: 36.9 (05 Apr 2021 00:15)  T(F): 97.6 (05 Apr 2021 08:30), Max: 98.4 (05 Apr 2021 00:15)  HR: 77 (05 Apr 2021 08:30) (56 - 77)  BP: 143/85 (05 Apr 2021 08:30) (143/85 - 168/80)  BP(mean): --  RR: 18 (05 Apr 2021 08:30) (18 - 18)  SpO2: 95% (05 Apr 2021 08:30) (95% - 96%)    MEDICATIONS  (STANDING):  ATENolol  Tablet 50 milliGRAM(s) Oral daily  cholecalciferol 1000 Unit(s) Oral daily  multivitamin 1 Tablet(s) Oral daily    MEDICATIONS  (PRN):  acetaminophen   Tablet .. 650 milliGRAM(s) Oral every 6 hours PRN Temp greater or equal to 38C (100.4F), Mild Pain (1 - 3), Moderate Pain (4 - 6)        CAPILLARY BLOOD GLUCOSE        I&O's Summary    04 Apr 2021 07:01  -  05 Apr 2021 07:00  --------------------------------------------------------  IN: 360 mL / OUT: 900 mL / NET: -540 mL    05 Apr 2021 07:01  -  05 Apr 2021 13:42  --------------------------------------------------------  IN: 200 mL / OUT: 0 mL / NET: 200 mL        PHYSICAL EXAM:  GENERAL: NAD, well-developed  HEAD:  Atraumatic, Normocephalic  EYES: EOMI, PERRLA, conjunctiva and sclera clear  NECK: Supple, No JVD  CHEST/LUNG: Clear to auscultation anteriorly  HEART: Regular rate and rhythm; No murmurs, rubs, or gallops  ABDOMEN: Soft, Nontender, Nondistended; Bowel sounds present  EXTREMITIES:  R arm in sling  PSYCH: AAOx3  NEUROLOGY: non-focal  SKIN: No rashes or lesions    LABS:                        12.3   6.89  )-----------( 327      ( 05 Apr 2021 06:55 )             40.4     04-05    136  |  107  |  29<H>  ----------------------------<  152<H>  4.4   |  20<L>  |  1.12    Ca    9.9      05 Apr 2021 06:53      PT/INR - ( 05 Apr 2021 06:56 )   PT: 22.6 sec;   INR: 1.94 ratio         PTT - ( 05 Apr 2021 06:56 )  PTT:31.3 sec          RADIOLOGY & ADDITIONAL TESTS:    Imaging Personally Reviewed:    Consultant(s) Notes Reviewed:      Care Discussed with Consultants/Other Providers: NP

## 2021-04-05 NOTE — PROGRESS NOTE ADULT - PROBLEM SELECTOR PLAN 5
On Atenolol 50 mg. Has had intermittent bradycardia, and has a prolonged DE interval. Would decrease Atenolol to 25mg/day and add Norvasc 5mg/d.

## 2021-04-05 NOTE — PROGRESS NOTE ADULT - PROBLEM SELECTOR PLAN 3
-The patient had a fall 5 years ago and had a period of immobility, during which she developed a DVT. She had since been continued on coumadin for anticoagulation.    Spoke w daughter- pt last fell 5 years back, has not had frequent falls. Discussed increased bleeding risk if pt falls again. For now, to continue Coumadin and discuss ongoing anticoagulation w PMD Dr Abdulaziz Pérez after discharge.     Attempted to call Dr Pérez's office but it's closed today.
-The patient had a fall 5 years ago and had a period of immobility, during which she developed a DVT. She had since been continued on coumadin for anticoagulation.   C/W coumadin while inpatient, will dose today. Check INR daily.   Given the patient's age, and gait instability, continuing anticoagulation may place her at risk for bleeding, would weigh risks and benefits with patient and family for discharging on AC.
-The patient had a fall 5 years ago and had a period of immobility, during which she developed a DVT. She had since been continued on coumadin for anticoagulation.   C/W coumadin while inpatient, will dose today. Check INR daily.   Given the patient's age, and gait instability, continuing anticoagulation may place her at risk for bleeding, would weigh risks and benefits with patient and family for discharging on AC.

## 2021-04-05 NOTE — PROGRESS NOTE ADULT - PROBLEM SELECTOR PLAN 4
Home dose of lutein not available on formulary. C/W daily vitamin.

## 2021-04-05 NOTE — PROGRESS NOTE ADULT - PROBLEM SELECTOR PLAN 6
VTE:  lovenox subQ  Activity: OOB with assistance  Diet: DASH
VTE: On Coumadin as above    D/c to rehab today. D/c time 40 mins.
VTE: On Coumadin as above  Activity: OOB with assistance  Diet: DASH

## 2021-04-05 NOTE — DISCHARGE NOTE PROVIDER - PROVIDER TOKENS
PROVIDER:[TOKEN:[[id number]],FOLLOWUP:[2 weeks],ESTABLISHEDPATIENT:[T]],PROVIDER:[TOKEN:[[id number]]]

## 2021-04-05 NOTE — PROGRESS NOTE ADULT - ASSESSMENT
This patient is a 89yo lady with PMH of breast cancer s/p resection and aromatase inhibitor use, colorectal cancer s/p rectosigmoid resection, LLE DVT on coumadin, htn, hLd, aortic stenosis, macular degeneration who presents to the ED with complaint of right arm pain from humeral fracture sustained from a fall on 3/27/2021.    
This patient is a 89yo lady with PMH of breast cancer s/p resection and aromatase inhibitor use, colorectal cancer s/p rectosigmoid resection, LLE DVT on coumadin, htn, hLd, aortic stenosis, macular degeneration who presents to the ED with complaint of right arm pain from humeral fracture sustained from a fall on 3/27/2021.    Awaiting KIRA. 
This patient is a 89yo lady with PMH of breast cancer s/p resection and aromatase inhibitor use, colorectal cancer s/p rectosigmoid resection, LLE DVT on coumadin, htn, hLd, aortic stenosis, macular degeneration who presents to the ED with complaint of right arm pain from humeral fracture sustained from a fall on 3/27/2021.    Awaiting KIRA.

## 2021-04-05 NOTE — PROGRESS NOTE ADULT - PROBLEM SELECTOR PLAN 1
Continue NWB RUE with sling.  Pt should follow up with Dr. Varela in 1-2 weeks.
Orthopedic surgery recommendations appreciated.  Continue NWB RUE with sling.  Pt should follow up with Dr. Varela in 1-2 weeks.  X ray of elbow neg for R radial bone fracture.   Apply ice PRN   P KIRA placement  Continue with tylenol for pain
Orthopedic surgery recommendations appreciated.  Continue NWB RUE with sling.  Pt should follow up with Dr. Varela in 1-2 weeks.  X ray of elbow neg for R radial bone fracture.   Apply ice PRN   Pending KIRA placement  Continue with tylenol for pain

## 2021-04-05 NOTE — DISCHARGE NOTE PROVIDER - HOSPITAL COURSE
This patient is a 89yo lady with PMH of breast cancer s/p resection and aromatase inhibitor use, colorectal cancer s/p rectosigmoid resection, LLE DVT on coumadin, htn, hLd, aortic stenosis, macular degeneration who presents to the ED with complaint of right arm pain sustained from a fall on 3/27/2021. The patient is very hard of hearing. History was provided by the patient's daughter, Gabi Jackson, over the phone. Gabi states that the patient has been using a walker until December 2020 and typically sleeps in a recliner. The patient has severely arthritic knees. In January 2021, her arthritis worsened with the cold weather. Gabi had acquired a wheelchair to assist the patient with getting to the restroom.  The patient had tried to maneuver from the wheelchair to her recliner by grabbing hold onto the back of a couch. She was able to walk a few steps, but her knee buckled and she toppled onto the recliner, hitting her right arm in the process.   That evening, she was able to go to sleep. An abrasion was noted at her right upper extremity. The following morning, the RUE appeared more swollen. Ice was applied and tylenol was given. The following day, the bruising appeared. Gabi notified the PMD, who ordered an xray which revealed R humerus fracture. EMS was called.     The patient denied any numbness in the extremities, but endorsed pain with movement of the right arm. She denied any hip pain. She also denied any dizziness, lightheadedness, chest pain, dyspnea or abdominal pain. Patient was seen by orthopedics for right upper humerus fracture , they recommended NWB RUE in sling  No acute orthopaedic intervention . Can follow up with Dr. Varela in 1-2 weeks or upon discharge

## 2021-04-05 NOTE — DISCHARGE NOTE PROVIDER - CARE PROVIDER_API CALL
Immanuel Varela)  Orthopaedic Surgery  [address], [address]  [place] [address]  Phone: [phone]  Fax: [phone]  Established Patient  Follow Up Time: 2 weeks    Abdulaziz Pérez  [address], [address]  [place] [address]  Phone: [phone]  Fax: [phone]  Follow Up Time:

## 2021-04-05 NOTE — DISCHARGE NOTE PROVIDER - CARE PROVIDERS DIRECT ADDRESSES
,[email or web address]

## 2021-04-05 NOTE — DISCHARGE NOTE PROVIDER - NSDCMRMEDTOKEN_GEN_ALL_CORE_FT
atenolol 50 mg oral tablet: 1 tab(s) orally once a day  cholecalciferol oral tablet: 1000 unit(s) orally once a day  Lutein 20 mg oral capsule: 1 cap(s) orally once a day  Multiple Vitamins oral tablet: 1 tab(s) orally once a day  warfarin 4 mg oral tablet: 1 tab(s) orally once a day for 6 days a week, 2mg once a week on Tuesday   atenolol 25 mg oral tablet: 1 tab(s) orally once a day  cholecalciferol oral tablet: 1000 unit(s) orally once a day  Lutein 20 mg oral capsule: 1 cap(s) orally once a day  Multiple Vitamins oral tablet: 1 tab(s) orally once a day  Norvasc 5 mg oral tablet: 1 tab(s) orally once a day  warfarin 4 mg oral tablet: 1 tab(s) orally once a day for 6 days a week, 2mg once a week on Tuesday

## 2021-04-05 NOTE — DISCHARGE NOTE NURSING/CASE MANAGEMENT/SOCIAL WORK - PATIENT PORTAL LINK FT
You can access the FollowMyHealth Patient Portal offered by [facility] by registering at the following website: [email or web address]. By joining [employer]’s FollowMyHealth portal, you will also be able to view your health information using other applications (apps) compatible with our system.

## 2021-04-12 ENCOUNTER — RESULT REVIEW (OUTPATIENT)
Age: 86
End: 2021-04-12

## 2021-04-22 ENCOUNTER — APPOINTMENT (OUTPATIENT)
Dept: ORTHOPEDIC SURGERY | Facility: CLINIC | Age: 86
End: 2021-04-22
Payer: MEDICARE

## 2021-04-22 VITALS — HEIGHT: 63 IN | WEIGHT: 170 LBS | BODY MASS INDEX: 30.12 KG/M2

## 2021-04-22 PROCEDURE — 99214 OFFICE O/P EST MOD 30 MIN: CPT

## 2021-04-22 PROCEDURE — 73030 X-RAY EXAM OF SHOULDER: CPT | Mod: RT

## 2021-04-22 NOTE — END OF VISIT
[FreeTextEntry3] : All medical record entries made by the Scribe were at my,  Dr. Immanuel Varela MD., direction and personally dictated by me on 04/22/2021. I have personally reviewed the chart and agree that the record accurately reflects my personal performance of the history, physical exam, assessment and plan.\par

## 2021-04-22 NOTE — HISTORY OF PRESENT ILLNESS
[de-identified] : Pt is a 89 y/o female with right proximal humerus fracture.  She fell onto the arm of a chair 3-4 weeks ago.  She had swelling and ecchymosis.  She was seen at [facility] ED where xrays were taken of multiple body parts.  Xray revealed right proximal humerus fracture.  All other xrays were negative for fracture.  She was admitted to [facility] for 4 days.  She is currently recovering in [facility].  She also c/o left ankle pain.  Xrays were taken of her left ankle on 4/12/21 which were negative for fracture.  Patient is at [facility].

## 2021-04-22 NOTE — ADDENDUM
[FreeTextEntry1] : I, Aliyah Abraham wrote this note acting as a scribe for Dr. Immanuel Varela on Apr 22, 2021.\par \par \par

## 2021-04-22 NOTE — PHYSICAL EXAM
[de-identified] : Patient is WDWN, alert, and in no acute distress. Breathing is unlabored. She is grossly oriented to person, place, \par and time.\par \par She is in a wheelchair\par Her daughter is present\par Right Shoulder:\par Mild tenderness over the proximal humerus\par Discomfort with ROM,passive flexion 0-45 [de-identified] : \par \par \par \par 4/22/21 - AP, transcapula, and axillary views of the right shoulder were obtained and revealed right proximal humerus fracture that is healing in acceptable position.

## 2021-04-22 NOTE — DISCUSSION/SUMMARY
[de-identified] : Patient advised that she is healing and is able to begin using the arm more.\par Encourage gentle ROM exercises\par Paperwork filled out for [facility]. \par \par Follow up in 4 weeks. \par

## 2021-05-27 ENCOUNTER — APPOINTMENT (OUTPATIENT)
Dept: ORTHOPEDIC SURGERY | Facility: CLINIC | Age: 86
End: 2021-05-27
Payer: MEDICARE

## 2021-05-27 DIAGNOSIS — S42.201A UNSPECIFIED FRACTURE OF UPPER END OF RIGHT HUMERUS, INITIAL ENCOUNTER FOR CLOSED FRACTURE: ICD-10-CM

## 2021-05-27 DIAGNOSIS — M17.0 BILATERAL PRIMARY OSTEOARTHRITIS OF KNEE: ICD-10-CM

## 2021-05-27 DIAGNOSIS — S42.294D OTHER NONDISPLACED FRACTURE OF UPPER END OF RIGHT HUMERUS, SUBSEQUENT ENCOUNTER FOR FRACTURE WITH ROUTINE HEALING: ICD-10-CM

## 2021-05-27 PROCEDURE — 20610 DRAIN/INJ JOINT/BURSA W/O US: CPT | Mod: 50

## 2021-05-27 PROCEDURE — 73030 X-RAY EXAM OF SHOULDER: CPT | Mod: 26,RT

## 2021-05-27 PROCEDURE — 99214 OFFICE O/P EST MOD 30 MIN: CPT | Mod: 25

## 2021-05-28 PROBLEM — M17.0 PRIMARY LOCALIZED OSTEOARTHRITIS OF KNEES, BILATERAL: Status: ACTIVE | Noted: 2018-06-07

## 2021-05-28 PROBLEM — S42.201A CLOSED FRACTURE OF PROXIMAL END OF RIGHT HUMERUS, UNSPECIFIED FRACTURE MORPHOLOGY, INITIAL ENCOUNTER: Status: ACTIVE | Noted: 2021-04-22

## 2021-05-28 NOTE — DISCUSSION/SUMMARY
[de-identified] : Patient advised that she is healing and is able to begin using the arm more.\par Encourage gentle ROM exercises\par Paperwork filled out for [facility].\par \par Regarding the knees:\par The patient wishes to proceed with a cortisone injection today. Under sterile precautions, an injection of 5cc 1% lidocaine without epinephrine and 0.5cc Kenalog 40mg, 0.5cc Dexamethasone was administered into the left and right knee. The patient tolerated the procedure well.\par \par Patient can continue activities as tolerated. All questions answered, understanding verbalized. Patient in agreement with plan of care. \par \par Follow up as needed.

## 2021-05-28 NOTE — PHYSICAL EXAM
[de-identified] : Patient is WDWN, alert, and in no acute distress. Breathing is unlabored. She is grossly oriented to person, place, \par and time.\par \par She is in a wheelchair\par Her daughter is present\par \par Right Shoulder:\par Mild tenderness over the proximal humerus\par Discomfort with ROM,passive flexion 0-45  [de-identified] : AP, transcapula, and axillary views of the right shoulder were obtained and revealed right proximal humerus fracture that is healing in acceptable position.

## 2021-05-28 NOTE — ADDENDUM
[FreeTextEntry1] : I, Aliyah Abraham wrote this note acting as a scribe for Dr. Immanuel Varela on May 27, 2021.\par \par \par

## 2021-05-28 NOTE — HISTORY OF PRESENT ILLNESS
[de-identified] : Pt is a 89 y/o female with right proximal humerus fracture. She fell onto the arm of a chair 3-4 weeks ago. She had swelling and ecchymosis. She was seen at [facility] ED where xrays were taken of multiple body parts. Xray revealed right proximal humerus fracture. All other xrays were negative for fracture. \par \par 5/27/21 - Patient is in office following up today for the right proximal humerus fracture. Patient states she is feeling better and has minimal pain. She states that if she does not have a cushion to lean on, the pain does in fact increase. She additionally complains of bilateral knee pain. She presents today, requesting bilateral knee cortisone injections. Patient denies DM. Patient is currently at [facility].

## 2021-05-28 NOTE — END OF VISIT
[FreeTextEntry3] : All medical record entries made by the Scribe were at my,  Dr. Immanuel Varela MD., direction and personally dictated by me on 05/27/2021. I have personally reviewed the chart and agree that the record accurately reflects my personal performance of the history, physical exam, assessment and plan.\par

## 2021-07-17 NOTE — OCCUPATIONAL THERAPY INITIAL EVALUATION ADULT - MANUAL MUSCLE TESTING RESULTS, REHAB EVAL
[General Appearance - Alert] : alert [General Appearance - In No Acute Distress] : in no acute distress [Sclera] : the sclera and conjunctiva were normal [PERRL With Normal Accommodation] : pupils were equal in size, round, and reactive to light [Extraocular Movements] : extraocular movements were intact [Outer Ear] : the ears and nose were normal in appearance [Oropharynx] : the oropharynx was normal [Auscultation Breath Sounds / Voice Sounds] : lungs were clear to auscultation bilaterally [Heart Rate And Rhythm] : heart rate was normal and rhythm regular at least 3/5 LUE/BLE. RUE NWB not assessed. [Heart Sounds] : normal S1 and S2 [Heart Sounds Gallop] : no gallops [Murmurs] : no murmurs [Heart Sounds Pericardial Friction Rub] : no pericardial rub [Full Pulse] : the pedal pulses are present [Edema] : there was no peripheral edema [Bowel Sounds] : normal bowel sounds [Abdomen Soft] : soft [Abdomen Tenderness] : non-tender [] : no hepato-splenomegaly [Abdomen Mass (___ Cm)] : no abdominal mass palpated [FreeTextEntry1] : A female chaperone was present during my exam.

## 2021-10-06 PROBLEM — K62.5 BLEEDING PER RECTUM: Status: ACTIVE | Noted: 2017-10-03

## 2021-11-16 NOTE — HISTORY OF PRESENT ILLNESS
[Disease: _____________________] : Disease: [unfilled] [T: ___] : T[unfilled] [M: ___] : M[unfilled] [AJCC Stage: ____] : AJCC Stage: [unfilled] [de-identified] : History of right breast cancer 4/2005-Stage 1 (T1N0), ER positive, MO positive, HER-2 negative. Status post right breast conservation-->Arimidex begun in 6/2005, and continued for 5 years.\par Right breast cancer again in 3/2013-Stage 1 (T1bNx), ER positive, MO positive, HER-2 negative. Status post right breast conservation surgery--> Aromasin begun 5/2013.\par 7/2018-Colorectal cancer diagnosed. 10/2018-s/p rectosigmoid resection-Stage IIA -T3 N0 (22 LN's examined). [de-identified] : Invasive well-differentiated duct carcinoma [de-identified] : Telehealth visit due to COVID-19 pandemic.\par Remains on Coumadin-getting monitored weekly by Dr. Pérez.\par +Knee arthralgias-no acute change reported. Ambulates with a walker in the house.\par No N/V/D or c/o abdominal/pelvic pain. No bleeding.\par No complaints of chest pain, shortness of breath.\par No fevers.\par \par \par \par  Lip Wedge Excision Repair Text: Given the location of the defect and the proximity to free margins a full thickness wedge repair was deemed most appropriate.  Using a sterile surgical marker, the appropriate repair was drawn incorporating the defect and placing the expected incisions perpendicular to the vermillion border.  The vermillion border was also meticulously outlined to ensure appropriate reapproximation during the repair.  The area thus outlined was incised through and through with a #15 scalpel blade.  The muscularis and dermis were reaproximated with deep sutures following hemostasis. Care was taken to realign the vermillion border before proceeding with the superficial closure.  Once the vermillion was realigned the superfical and mucosal closure was finished.

## 2022-06-19 NOTE — OCCUPATIONAL THERAPY INITIAL EVALUATION ADULT - VISUAL ASSESSMENT: VISUAL NEGLECT
Nephrology  Patient: Deniz Clay IHNP:   : 1957 Attending: Andrey Garcia MD   59year old female        Chief complaint: weight gain    Reason for nephrology consult: ANAHY      HPI from initial consult: This is a 59year old female with a past medical history significant for hypertension, HFpEF, CKD 3, CAD, COPD who initially presented on 6/15/22 with complaints of weight gain. Per report, pt had been taking her diuretics as prescribed but still gained weight. She denied chest pain or SOB. She was started on a lasix gtt and admitted for further management of heart failure exacerbation. Nephrology was consulted for acute kidney injury. 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 Baseline creatinine has large fluctuations between 1.1-1.4 mg/dL, consistent with CKD 3. Cr up to 1.99mg/dL on presentation to the Ed, now with slight improving on morning of consult. Pt also hyponatremic at 133. PTA diuretics include lasix 40mg PO BID, spironolactone 25mg PO BID, metolazone 5mg PO daily. Currently requiring 2L NC.      :  Feels slightly better. Good urine output.     Past Medical History:   Diagnosis Date   â¢ Acute on chronic renal failure (CMS/[facility]) 2021   â¢ Acute vascular insufficiency of intestine (CMS/[facility]) 2021    duodenal   â¢ Anemia    â¢ Anxiety    â¢ Arthritis    â¢ AVM (arteriovenous malformation) of duodenum, acquired 2021   â¢ Blood clot associated with vein wall inflammation    â¢ Blood pressure abnormally low     when taken on left arm   â¢ Chronic pain     BLE   â¢ Congestive cardiac failure (CMS/[facility])    â¢ COPD (chronic obstructive pulmonary disease) (CMS/[facility])    â¢ Coronary artery disease    â¢ Coronavirus infection 2022    Coronavirus, HKU1   â¢ Depression    â¢ Dieulafoy lesion of stomach    â¢ Essential (primary) hypertension    â¢ Failed moderate sedation during procedure    â¢ GI bleed 2020   â¢ High cholesterol    â¢ History of blood transfusion 2021   â¢ History of renal dialysis 2021   â¢ Malignant neoplasm (CMS/HCC)     found lung nodule in sept; rescan in December   â¢ MRSA (methicillin resistant Staphylococcus aureus)    â¢ Myocardial infarction (CMS/HCC)    â¢ Pulmonary edema    â¢ Severe tricuspid regurgitation 2020   â¢ Shortness of breath    â¢ Sleep apnea    â¢ Urinary incontinence    â¢ Wears dentures    â¢ Wears eyeglasses        Past Surgical History:   Procedure Laterality Date   â¢ Cabg, artery-vein, three     â¢ Cardiac catherization     â¢ Cardiac surgery     â¢ Cath lab case      stent x 6    â¢ Egd  2021   â¢ Egd  2021   â¢ Enteroscopy  2020   â¢ Ep study/svt ablation  2021   â¢ Mitral valve surgery  2020    CLIP   â¢ Renal artery stent Left 2019    twice   â¢ Subclavian artery stent     â¢ Watchman patch mitral valve  2021       Social History     Socioeconomic History   â¢ Marital status: /Civil Union     Spouse name: Not on file   â¢ Number of children: Not on file   â¢ Years of education: Not on file   â¢ Highest education level: Not on file   Occupational History   â¢ Not on file   Tobacco Use   â¢ Smoking status: Former Smoker     Packs/day: 1.00     Years: 40.00     Pack years: 40.00     Types: Cigarettes     Quit date:      Years since quittin.4   â¢ Smokeless tobacco: Never Used   â¢ Tobacco comment: quit 4.5 years ago , 40 Pk years per note May 2021   Vaping Use   â¢ Vaping Use: Not on file   Substance and Sexual Activity   â¢ Alcohol use: Not Currently   â¢ Drug use: No   â¢ Sexual activity: Not on file   Other Topics Concern   â¢ Not on file   Social History Narrative   â¢ Not on file     Social Determinants of Health     Financial Resource Strain: Low Risk    â¢ Social Determinants: Financial Resource Strain: None   Food Insecurity: No Food Insecurity   â¢ Social Determinants: Food Insecurity: Never   Transportation Needs: No Transportation Needs   â¢ Lack of Transportation (Medical):  No   â¢ Lack "of Transportation (Non-Medical): No   Physical Activity: Not on file   Stress: Not on file   Social Connections: Socially Integrated   â¢ Social Determinants: Social Connections: 5 or more times a week   Intimate Partner Violence: Not At Risk   â¢ Social Determinants: Intimate Partner Violence Past Fear: No   â¢ Social Determinants: Intimate Partner Violence Current Fear: No       Family History   Problem Relation Age of Onset   â¢ Hypertension Mother    â¢ Cancer Mother         brain cancer   â¢ Hypertension Sister    â¢ Diabetes Maternal Grandmother        ALLERGIES:   Allergen Reactions   â¢ Iodinated Diagnostic Agents Other (See Comments)     IV contrast Ac Renal Failure  Renal fialure resulting in dialysis     â¢ Morphine Hallucinations and Other (See Comments)   â¢ Contrast Media Other (See Comments)     IV contrast Ac Renal Failure         Review of Systems:  Positives stated above in HPI. Full ROS completed and is otherwise negative. Vital Last Value 24 Hour Range   Temperature 97.8 Â°F (36.6 Â°C) Temp  Min: 97.6 Â°F (36.4 Â°C)  Max: 97.8 Â°F (36.6 Â°C)   Pulse 66 Pulse  Min: 60  Max: 66   Respiratory 16 Resp  Min: 16  Max: 16   Blood Pressure 105/61 BP  Min: 105/61  Max: 119/60   Art BP   No data recorded   Pulse Oximetry 92 % SpO2  Min: 92 %  Max: 94 %     Vital Today Admitted   Weight 83.2 kg (183 lb 6.8 oz) Weight: 89.4 kg (197 lb)   Height N/A Height: 5' 7"" (170.2 cm)   BMI N/A BMI (Calculated): 30.35     Weight over the past 48 Hours:  Patient Vitals for the past 48 hrs:   Weight   06/18/22 0440 85.4 kg (188 lb 4.4 oz)   06/19/22 0600 83.2 kg (183 lb 6.8 oz)        Hemodynamics:      Last Value 24 Hour Range   CVP   No data recorded   PAS/PAD   No data recorded   PCWP   No data recorded   CO   No data recorded   CI   No data recorded   SVR   No data recorded   SV02   No data recorded     Intake/Output:    Last Stool Occurrence: 1 (06/18/22 1200)    No intake/output data recorded.     I/O last 3 completed " shifts: In: 1 [P.O.:800;  I.V.:51]  Out: 2200 [Urine:2200]      Intake/Output Summary (Last 24 hours) at 6/19/2022 1546  Last data filed at 6/19/2022 1428  Gross per 24 hour   Intake 851.03 ml   Output 2200 ml   Net -1348.97 ml       Medications/Infusions:  Current Facility-Administered Medications   Medication Dose Route Frequency Provider Last Rate Last Admin   â¢ furosemide (LASIX) tablet 60 mg  60 mg Oral BID Bisi Bo MD   60 mg at 06/19/22 0958   â¢ spironolactone (ALDACTONE) tablet 50 mg  50 mg Oral Daily Bisi Bo MD   50 mg at 06/19/22 0958   â¢ acetaminophen-codeine (TYLENOL NO.3) 300-30 MG per tablet 1 tablet  1 tablet Oral Q4H PRN Andrey Garcia MD   1 tablet at 06/18/22 1223   â¢ ARIPiprazole (ABILIFY) tablet 15 mg  15 mg Oral Nightly Andrey Garcia MD   15 mg at 06/18/22 2056   â¢ aspirin (ECOTRIN) enteric coated tablet 81 mg  81 mg Oral Daily Andrey Garcia MD   81 mg at 06/19/22 0758   â¢ atorvastatin (LIPITOR) tablet 80 mg  80 mg Oral QHS Andrey Garcia MD   80 mg at 06/18/22 2056   â¢ carvedilol (COREG) tablet 6.25 mg  6.25 mg Oral BID  Andrey Garcia MD   6.25 mg at 06/19/22 0759   â¢ docusate sodium-sennosides (SENOKOT S) 50-8.6 MG 2 tablet  2 tablet Oral Daily PRN Andrey Garcia MD       â¢ ezetimibe (ZETIA) tablet 10 mg  10 mg Oral Nightly Andrey Garcia MD   10 mg at 06/18/22 2056   â¢ ferrous sulfate (65 mg Fe per 325 mg) tablet 325 mg  325 mg Oral BID  Andrey Garcia MD   325 mg at 06/19/22 0759   â¢ gabapentin (NEURONTIN) capsule 200 mg  200 mg Oral 2 times per day Andrey Garcia MD   200 mg at 06/19/22 0759   â¢ isosorbide mononitrate (IMDUR) ER tablet 30 mg  30 mg Oral Daily Andrey Garcia MD   30 mg at 06/19/22 0758   â¢ magnesium hydroxide (MILK OF MAGNESIA) 400 MG/5ML suspension 15 mL  15 mL Oral Daily PRN Andrey Garcia MD       â¢ magnesium oxide (MAG-OX) tablet 600 mg  600 mg Oral BID Andrey Garcia MD   600 mg at 06/19/22 0758   â¢ pantoprazole (PROTONIX) EC tablet 40 mg  40 mg Oral BID AC Caro Johnston MD   40 mg at 06/19/22 0805   â¢ senna (SENOKOT) 8.6 mg  1 tablet Oral PRN Caro Johnston MD       â¢ sertraline (ZOLOFT) tablet 100 mg  100 mg Oral Daily Caro Johnston MD   100 mg at 06/19/22 0759   â¢ LORazepam (ATIVAN) injection 0.5 mg  0.5 mg Intravenous Q6H PRN Caro Johnston MD       â¢ hydrALAZINE (APRESOLINE) injection 10 mg  10 mg Intravenous Q6H PRN Caro Johnston MD       â¢ sodium chloride (PF) 0.9 % injection 2 mL  2 mL Intracatheter 2 times per day Caro Johnston MD   2 mL at 06/19/22 0805   â¢ sodium chloride 0.9 % flush bag 25 mL  25 mL Intravenous PRN Caro Johnston MD       â¢ heparin (porcine) injection 5,000 Units  5,000 Units Subcutaneous 3 times per day Caro Johnston MD   5,000 Units at 06/18/22 0523   â¢ Potassium Standard Replacement Protocol   Does not apply See Cathleen Avila MD       â¢ Magnesium Standard Replacement Protocol   Does not apply See Cathleen Avila MD       â¢ ondansetron [facility]) injection 4 mg  4 mg Intravenous BID PRN Caro Johnston MD       â¢ acetaminophen (TYLENOL) tablet 650 mg  650 mg Oral Q4H PRN Caro Johnston MD   650 mg at 06/17/22 2116   â¢ polyethylene glycol (MIRALAX) packet 17 g  17 g Oral Daily PRN Caro Johnston MD       â¢ bisacodyl (DULCOLAX) suppository 10 mg  10 mg Rectal Daily PRN Caro Johnston MD         Current Facility-Administered Medications   Medication Dose Route Frequency Provider Last Rate Last Admin   â¢ furosemide (LASIX) tablet 60 mg  60 mg Oral BID Jasvir Sr MD   60 mg at 06/19/22 0958   â¢ spironolactone (ALDACTONE) tablet 50 mg  50 mg Oral Daily Jasvir Sr MD   50 mg at 06/19/22 0958   â¢ ARIPiprazole (ABILIFY) tablet 15 mg  15 mg Oral Nightly Caro Johnston MD   15 mg at 06/18/22 2056   â¢ aspirin (ECOTRIN) enteric coated tablet 81 mg  81 mg Oral Daily Caro Johnston MD   81 mg at 06/19/22 0758   â¢ atorvastatin (LIPITOR) tablet 80 mg  80 mg Oral QHS Jm Mcdonough MD   80 mg at 06/18/22 2056   â¢ carvedilol (COREG) tablet 6.25 mg  6.25 mg Oral BID  Jm Mcdonough MD   6.25 mg at 06/19/22 0759   â¢ ezetimibe (ZETIA) tablet 10 mg  10 mg Oral Nightly Jm Mcdonough MD   10 mg at 06/18/22 2056   â¢ ferrous sulfate (65 mg Fe per 325 mg) tablet 325 mg  325 mg Oral BID  Jm Mcdonough MD   325 mg at 06/19/22 0759   â¢ gabapentin (NEURONTIN) capsule 200 mg  200 mg Oral 2 times per day Jm Mcdonough MD   200 mg at 06/19/22 0759   â¢ isosorbide mononitrate (IMDUR) ER tablet 30 mg  30 mg Oral Daily Jm Mcdonough MD   30 mg at 06/19/22 0758   â¢ magnesium oxide (MAG-OX) tablet 600 mg  600 mg Oral BID Jm Mcdonough MD   600 mg at 06/19/22 0758   â¢ pantoprazole (PROTONIX) EC tablet 40 mg  40 mg Oral BID  Jm Mcdonough MD   40 mg at 06/19/22 0805   â¢ sertraline (ZOLOFT) tablet 100 mg  100 mg Oral Daily Jm Mcdonough MD   100 mg at 06/19/22 0759   â¢ sodium chloride (PF) 0.9 % injection 2 mL  2 mL Intracatheter 2 times per day Jm Mcdonough MD   2 mL at 06/19/22 0805   â¢ heparin (porcine) injection 5,000 Units  5,000 Units Subcutaneous 3 times per day Jm Mcdonough MD   5,000 Units at 06/18/22 0523   â¢ Potassium Standard Replacement Protocol   Does not apply See Manuel Cabezas MD       â¢ Magnesium Standard Replacement Protocol   Does not apply See Admin Instructions Jm Mcdonough MD         Current Facility-Administered Medications   Medication Dose Route Frequency Provider Last Rate Last Admin       Physical Exam:    General: Alert, no acute distress  HEENT: Head atraumatic, normocephalic. Moist oral mucus membranes. Sclera non-icteric. Neck: Supple, no JVD. Trachea midline. Chest/Lungs: Normal effort. Symmetrical expansion. Clear to auscultation. No wheezes, rales or rhonchi. CVS: Regular rate and rhythm. S1,S2 well heard.  There is no S3 or S4 gallop. Has no pericardial rub heard. Abdomen: BS well heard. Soft, non tender, non distended. No hepatosplenomegaly. Neuro: No focal neurological deficit. A&O x 3. Skin: Warm, dry. No rashes. Extremities: 1+ 2+ edema     Laboratory Results:  Recent Labs   Lab 06/19/22  0526 06/18/22  0950 06/18/22  0521 06/17/22  0603 06/16/22  0544 06/15/22  0835   SODIUM 132*  --  133* 132* 133* 133*   POTASSIUM 4.0 3.8 3.3* 3.5 3.9 4.4   CHLORIDE 96*  --  96* 95* 97 97   CO2 26  --  25 28 24 25   ANIONGAP 14  --  15 13 16 15   BUN 68*  --  68* 64* 65* 66*   CREATININE 1.65*  --  1.63* 1.75* 1.85* 1.99*   GLUCOSE 81  --  84 82 79 84   CALCIUM 8.6  --  8.3* 8.2* 8.5 8.7   ALBUMIN  --   --   --   --   --  3.1*   MG  --   --   --   --  1.9  --    AST  --   --   --   --   --  26   GPT  --   --   --   --   --  13   ALKPT  --   --   --   --   --  172*   BILIRUBIN  --   --   --   --   --  1.3*       Recent Labs   Lab 06/19/22  0526 06/18/22  0521 06/17/22  0603   WBC 6.4 4.9 4.8   HGB 8.2* 7.3* 7.4*   HCT 25.6* 23.0* 22.7*   * 115* 128*   PST  --   --  22       No results found    No results found    Urine Panel  No results found    Imaging/Procedures:  CXR 6/15/22  FINDINGS:    Unchanged sternal wires and mediastinal clips. Heart size remains mildly enlarged, unchanged. Pulmonary vasculature is indistinct. Continued diffuse interstitial opacity. No pleural effusion or  pneumothorax. IMPRESSION:  Similar exam with diffuse interstitial opacity. This may reflect  interstitial pulmonary edema.       Impression, Plan & Recommendations:    Â· Acute kidney injury on CKD stage 3  Â· Baseline creatinine with large fluctuations between 1.1-1.4mg/dL  Â· ANAHY etiology likely cardiorenal syndrome  Â· Non-oliguric  Â· Cr is improving   Â· Avoid nephrotoxins  Â· Hyponatremia, hypervolemic  Â· Monitor with diuresis  Â· Acute on chronic HFpEF  Â· PTA on lasix 40mg PO BID, spironolactone 25mg PO BID - will need to adjust at time of olena  Â· Switched to p.o. Lasix 60 mg b.i.d. today from an IV Lasix drip.   Â· Daily weights and Is/Os  Â· Cardiology following as well  Â· Hypertension  Â· BP acceptable on present medications  Â· Anemia of chronic disease  Â· Check iron studies  Â· SHORTY if iron replete        Jodeen Nageotte   Transplant Nephrologist, [facility] not observed

## 2022-07-28 NOTE — PHYSICAL THERAPY INITIAL EVALUATION ADULT - ONSET DATE, REHAB EVAL
He had a bilateral venous reflux study on 7/20/2022 consistent with bilateral venous insufficiency.  I referred him to Dr. Yosi Guerrero     20-Sep-2018

## 2023-01-09 NOTE — REASON FOR VISIT
[Follow-Up Visit] : a follow-up visit for [Knee Pain] : knee pain V-Y Flap Text: The defect edges were debeveled with a #15 scalpel blade.  Given the location of the defect, shape of the defect and the proximity to free margins a V-Y flap was deemed most appropriate.  Using a sterile surgical marker, an appropriate advancement flap was drawn incorporating the defect and placing the expected incisions within the relaxed skin tension lines where possible.    The area thus outlined was incised deep to adipose tissue with a #15 scalpel blade.  The skin margins were undermined to an appropriate distance in all directions utilizing iris scissors.

## 2023-01-25 NOTE — PROGRESS NOTE ADULT - PROBLEM SELECTOR PLAN 2
Routing refill request to provider for review/approval because:    Requested Prescriptions   Pending Prescriptions Disp Refills    losartan (COZAAR) 100 MG tablet [Pharmacy Med Name: Losartan Potassium 100 MG Oral Tablet] 90 tablet 0     Sig: Take 1 tablet by mouth once daily       Angiotensin-II Receptors Failed - 1/24/2023  9:13 AM        Failed - Normal serum creatinine on file in past 12 months     Recent Labs   Lab Test 11/07/22  1708   CR 1.48*       Ok to refill medication if creatinine is low       
Fall precautions   B12 and folate WNL
Fall precautions   B12 and folate WNL   KIRA placement P
Fall precautions   B12 and folate WNL   KIRA placement pending

## 2023-08-10 ENCOUNTER — INPATIENT (INPATIENT)
Facility: HOSPITAL | Age: 88
LOS: 5 days | Discharge: HOME HEALTH | DRG: 606 | End: 2023-08-16
Attending: STUDENT IN AN ORGANIZED HEALTH CARE EDUCATION/TRAINING PROGRAM | Admitting: STUDENT IN AN ORGANIZED HEALTH CARE EDUCATION/TRAINING PROGRAM
Payer: MEDICARE

## 2023-08-10 VITALS
RESPIRATION RATE: 20 BRPM | OXYGEN SATURATION: 98 % | TEMPERATURE: 98 F | DIASTOLIC BLOOD PRESSURE: 73 MMHG | SYSTOLIC BLOOD PRESSURE: 120 MMHG | HEART RATE: 78 BPM

## 2023-08-10 DIAGNOSIS — Z98.890 OTHER SPECIFIED POSTPROCEDURAL STATES: Chronic | ICD-10-CM

## 2023-08-10 DIAGNOSIS — Z98.49 CATARACT EXTRACTION STATUS, UNSPECIFIED EYE: Chronic | ICD-10-CM

## 2023-08-10 DIAGNOSIS — Z90.89 ACQUIRED ABSENCE OF OTHER ORGANS: Chronic | ICD-10-CM

## 2023-08-10 DIAGNOSIS — Z90.49 ACQUIRED ABSENCE OF OTHER SPECIFIED PARTS OF DIGESTIVE TRACT: Chronic | ICD-10-CM

## 2023-08-10 DIAGNOSIS — R22.0 LOCALIZED SWELLING, MASS AND LUMP, HEAD: ICD-10-CM

## 2023-08-10 LAB
ALBUMIN SERPL ELPH-MCNC: 3.4 G/DL — SIGNIFICANT CHANGE UP (ref 3.3–5)
ALP SERPL-CCNC: 94 U/L — SIGNIFICANT CHANGE UP (ref 40–120)
ALT FLD-CCNC: 17 U/L — SIGNIFICANT CHANGE UP (ref 10–45)
ANION GAP SERPL CALC-SCNC: 12 MMOL/L — SIGNIFICANT CHANGE UP (ref 5–17)
APTT BLD: 44.5 SEC — HIGH (ref 24.5–35.6)
AST SERPL-CCNC: 18 U/L — SIGNIFICANT CHANGE UP (ref 10–40)
BASOPHILS # BLD AUTO: 0.03 K/UL — SIGNIFICANT CHANGE UP (ref 0–0.2)
BASOPHILS NFR BLD AUTO: 0.4 % — SIGNIFICANT CHANGE UP (ref 0–2)
BILIRUB SERPL-MCNC: 0.1 MG/DL — LOW (ref 0.2–1.2)
BUN SERPL-MCNC: 20 MG/DL — SIGNIFICANT CHANGE UP (ref 7–23)
CALCIUM SERPL-MCNC: 10.5 MG/DL — SIGNIFICANT CHANGE UP (ref 8.4–10.5)
CHLORIDE SERPL-SCNC: 103 MMOL/L — SIGNIFICANT CHANGE UP (ref 96–108)
CO2 SERPL-SCNC: 25 MMOL/L — SIGNIFICANT CHANGE UP (ref 22–31)
CREAT SERPL-MCNC: 0.8 MG/DL — SIGNIFICANT CHANGE UP (ref 0.5–1.3)
EGFR: 65 ML/MIN/1.73M2 — SIGNIFICANT CHANGE UP
EOSINOPHIL # BLD AUTO: 0.12 K/UL — SIGNIFICANT CHANGE UP (ref 0–0.5)
EOSINOPHIL NFR BLD AUTO: 1.4 % — SIGNIFICANT CHANGE UP (ref 0–6)
GLUCOSE SERPL-MCNC: 208 MG/DL — HIGH (ref 70–99)
HCT VFR BLD CALC: 36.2 % — SIGNIFICANT CHANGE UP (ref 34.5–45)
HGB BLD-MCNC: 11.3 G/DL — LOW (ref 11.5–15.5)
IMM GRANULOCYTES NFR BLD AUTO: 1.8 % — HIGH (ref 0–0.9)
INR BLD: 4.43 RATIO — HIGH (ref 0.85–1.18)
LYMPHOCYTES # BLD AUTO: 1.25 K/UL — SIGNIFICANT CHANGE UP (ref 1–3.3)
LYMPHOCYTES # BLD AUTO: 14.7 % — SIGNIFICANT CHANGE UP (ref 13–44)
MCHC RBC-ENTMCNC: 28.1 PG — SIGNIFICANT CHANGE UP (ref 27–34)
MCHC RBC-ENTMCNC: 31.2 GM/DL — LOW (ref 32–36)
MCV RBC AUTO: 90 FL — SIGNIFICANT CHANGE UP (ref 80–100)
MONOCYTES # BLD AUTO: 0.64 K/UL — SIGNIFICANT CHANGE UP (ref 0–0.9)
MONOCYTES NFR BLD AUTO: 7.5 % — SIGNIFICANT CHANGE UP (ref 2–14)
NEUTROPHILS # BLD AUTO: 6.31 K/UL — SIGNIFICANT CHANGE UP (ref 1.8–7.4)
NEUTROPHILS NFR BLD AUTO: 74.2 % — SIGNIFICANT CHANGE UP (ref 43–77)
NRBC # BLD: 0 /100 WBCS — SIGNIFICANT CHANGE UP (ref 0–0)
PLATELET # BLD AUTO: 378 K/UL — SIGNIFICANT CHANGE UP (ref 150–400)
POTASSIUM SERPL-MCNC: 4.5 MMOL/L — SIGNIFICANT CHANGE UP (ref 3.5–5.3)
POTASSIUM SERPL-SCNC: 4.5 MMOL/L — SIGNIFICANT CHANGE UP (ref 3.5–5.3)
PROT SERPL-MCNC: 6.5 G/DL — SIGNIFICANT CHANGE UP (ref 6–8.3)
PROTHROM AB SERPL-ACNC: 44.5 SEC — HIGH (ref 9.5–13)
RBC # BLD: 4.02 M/UL — SIGNIFICANT CHANGE UP (ref 3.8–5.2)
RBC # FLD: 13.7 % — SIGNIFICANT CHANGE UP (ref 10.3–14.5)
SODIUM SERPL-SCNC: 140 MMOL/L — SIGNIFICANT CHANGE UP (ref 135–145)
WBC # BLD: 8.5 K/UL — SIGNIFICANT CHANGE UP (ref 3.8–10.5)
WBC # FLD AUTO: 8.5 K/UL — SIGNIFICANT CHANGE UP (ref 3.8–10.5)

## 2023-08-10 PROCEDURE — 70487 CT MAXILLOFACIAL W/DYE: CPT | Mod: 26,MA

## 2023-08-10 PROCEDURE — 99285 EMERGENCY DEPT VISIT HI MDM: CPT | Mod: FS

## 2023-08-10 PROCEDURE — 99223 1ST HOSP IP/OBS HIGH 75: CPT | Mod: 25

## 2023-08-10 PROCEDURE — 70460 CT HEAD/BRAIN W/DYE: CPT | Mod: 26,MA

## 2023-08-10 RX ORDER — ERYTHROMYCIN BASE 5 MG/GRAM
1 OINTMENT (GRAM) OPHTHALMIC (EYE) AT BEDTIME
Refills: 0 | Status: DISCONTINUED | OUTPATIENT
Start: 2023-08-10 | End: 2023-08-16

## 2023-08-10 RX ORDER — OFLOXACIN 0.3 %
1 DROPS OPHTHALMIC (EYE)
Refills: 0 | Status: DISCONTINUED | OUTPATIENT
Start: 2023-08-10 | End: 2023-08-16

## 2023-08-10 NOTE — H&P ADULT - PROBLEM/PLAN-1
Patient needs HCTZ sent to [facility] on [address]. States this is a new RX she was supposed to be starting on. DISPLAY PLAN FREE TEXT

## 2023-08-10 NOTE — H&P ADULT - HISTORY OF PRESENT ILLNESS
Gabi, patient's HCP whom pt lives with. Have been in contact with doctor (Abdulaziz Pérez PCP) regarding mass, only photos have been sent. Facial mass has been growing x several months, less than 1 year. Now pushing on eye. Today called EMS because eye looked infected and L eye was swollen shut. Also pt c/o pain more frequently, given tylenol for pain. Has macular degeneration. Pt has not been able to walk around since having COVID 6 months ago, unable to take pt to appointments since.  89y F pmh Basal cell Ca left cheek s/p mohs's ~4yr ago, DVT left leg on Coumadin, htn, hld, [place], macular degeneration p/w daughter/ HCP Gabi at bedside who pt lives w/ for c/o left facial mass. Pt has growing facial mass on left forehead xmonths. Have been in contact with doctor (Abdulaziz Pérez PCP) regarding mass, who have been doing watchful waiting of mass. Mass has now grown to point that it is pushing on eye, and recently in past few days hs been associated w/ pustular drainage. At home daughter has been cleaning mass w/ peroxide and applying OTC abx ointment. Pt now experiencing increased  pain/discomfort ( moderately relieved w/Tyelenol), vision obstruction, which prompted pt and family to come to ED for eval. Pt has had limited mobility since getting COVID 6 months ago, so daughter unable to take pt to appointment for eval.      ROS: Denies CP, SOB, palpitation, N/V/D, fever, cough, chills, dizziness, abm pain, recent travel, sick contact, change in bowel and urinary habits     A 10-system ROS was performed and is negative except as noted above and/or in the HPI.

## 2023-08-10 NOTE — CONSULT NOTE ADULT - ASSESSMENT
Assessment and Recommendations:  89y female w/ POHx CE PCIOL, glaucoma, and ARMD presenting with large,  necrotic left forehead mass, suspicious for neoplasm, as well as injection and copious mucopurulent discharge OS, concerning for bacterial conjunctivitis.    #L-Forehead Mass w/ Concern for Neoplasm   -Patient noted to have large, necrotic, and friable left forehead mass with associated mechanical ptosis OS. Mass noted to periodically bleed during assessment iso manipulation of healthy, surrounding tissue.   -Per patient's daughter at bedside, mass developed approximately 6 months ago and has been growing rapidly   -Overall, appearance of mass concerning for malignancy, particularly iso patient's hx of basal cell carcinoma (although mass w/ squamous CA appearance)  -CT maxillofacial with IV contrast showing no orbital involvement  -Recommend admission to medicine  -Recommend wound care consult   -Recommend head & neck surgery consult    -Recommend MRI face & orbits with IV contrast     #Bacterial Conjunctivitis OS  #Dry eye OU  -Patient noted to have copious mucopurulent discharge OS and +2-3 injection.   -Appearance of findings concerning for bacterial conjunctivitis.  -Recommend ofloxacin QID OS  -Recommend erythromycin ointment at bedtime OS  -Please culture the discharge OS  -Recommend artificial tears QID OU    Patient discussed with Dr. Martinez and oculoplastics attending Dr. Krishna.    Outpatient follow-up: Patient should follow-up with his/her ophthalmologist or with [facility] Department of Ophthalmology at the address below     [address] [place] [address]. [address]  [place] [address]  [phone]     Assessment and Recommendations:  89y female w/ POHx CE PCIOL, glaucoma, and ARMD presenting with large,  necrotic left forehead mass, suspicious for neoplasm, as well as injection and copious mucopurulent discharge OS, concerning for bacterial conjunctivitis.    #L-Forehead Mass w/ Concern for Neoplasm   -Patient noted to have large, necrotic, and friable left forehead mass with associated mechanical ptosis OS. Mass noted to periodically bleed during assessment iso manipulation of healthy, surrounding tissue.   -Per patient's daughter at bedside, mass developed approximately 6 months ago and has been growing rapidly   -Overall, appearance of mass concerning for malignancy, particularly iso patient's hx of basal cell carcinoma (although mass w/ squamous CA appearance)  -CT maxillofacial with IV contrast showing no orbital involvement  -Recommend admission to medicine  -Recommend wound care consult   -Recommend head & neck surgery consult    -Recommend MRI face & orbits with IV contrast     #Bacterial Conjunctivitis OS  #Dry eye OU  -Patient noted to have copious mucopurulent discharge OS and +2-3 injection.   -Appearance of findings concerning for bacterial conjunctivitis.  -Recommend ofloxacin QID OS  -Recommend erythromycin ointment at bedtime OS  -Please culture the discharge OS  -Recommend artificial tears QID OU    #Glaucoma, likely POAG OU  -Noted to have increased CDR OU (0.6 OD, 0.8 OS) with +APD OS  -IOP wnl today   -Please clarify patient's IOP lowering drop; Patient's daughter to bring tomorrow     Patient discussed with Dr. Martinez and oculoplastics attending Dr. Krishna.    Outpatient follow-up: Patient should follow-up with his/her ophthalmologist or with [facility] Department of Ophthalmology at the address below     [address] [place] [address]. [address]  [place] [address]  [phone]     Assessment and Recommendations:  89y female w/ POHx CE PCIOL, glaucoma, and ARMD presenting with large,  necrotic left forehead mass, suspicious for neoplasm, as well as injection and copious mucopurulent discharge OS, concerning for bacterial conjunctivitis.    #L-Forehead Mass w/ Concern for Neoplasm   -Patient noted to have large, necrotic, and friable left forehead mass with associated mechanical ptosis OS. Mass noted to periodically bleed during assessment iso manipulation of healthy, surrounding tissue.   -Per patient's daughter at bedside, mass developed approximately 6 months ago and has been growing rapidly   -Overall, appearance of mass concerning for malignancy, particularly iso patient's hx of basal cell carcinoma (although mass w/ squamous CA appearance)  -CT maxillofacial with IV contrast showing no orbital involvement  -Recommend admission to medicine  -Recommend wound care consult   -Recommend head & neck surgery consult    -Recommend MRI face & orbits with IV contrast     #Bacterial Conjunctivitis OS  #Dry eye OU  -Patient noted to have copious mucopurulent discharge OS and +2-3 injection.   -Appearance of findings concerning for bacterial conjunctivitis.  -Recommend ofloxacin QID OS  -Recommend erythromycin ointment at bedtime OS  -Please culture the conjunctiva OS  -Recommend artificial tears QID OU    #Glaucoma, likely POAG OU  -Noted to have increased CDR OU (0.6 OD, 0.8 OS) with +APD OS  -IOP wnl today   -Please clarify patient's IOP lowering drop; Patient's daughter to bring tomorrow     Patient discussed with Dr. Martinez and oculoplastics attending Dr. Krishna.    Outpatient follow-up: Patient should follow-up with his/her ophthalmologist or with [facility] Department of Ophthalmology at the address below     [address]  [place] [address]  [phone]

## 2023-08-10 NOTE — H&P ADULT - NSHPLABSRESULTS_GEN_ALL_CORE
11.3   8.50  )-----------( 378      ( 10 Aug 2023 15:59 )             36.2     08-10    140  |  103  |  20  ----------------------------<  208<H>  4.5   |  25  |  0.80    Ca    10.5      10 Aug 2023 15:59    TPro  6.5  /  Alb  3.4  /  TBili  0.1<L>  /  DBili  x   /  AST  18  /  ALT  17  /  AlkPhos  94  08-10      - - - - - - - - - - - - - - - - - - - - - - - - - - - - - - - - - - - - - - - - - - - - - - - - - - - -       EKG personally reviewed:  NSR 70bpm, 1st AVB    Images personally reviewed:     < from: CT Maxillofacial w/ IV Cont (08.10.23 @ 20:10) >    IMPRESSION:  Moderate periventricular white matter ischemia.    7.5 x 2.4   x 7.0 cm dermal mass in the LEFT forehead and periorbital region   suspicious for a neoplasm.

## 2023-08-10 NOTE — ED ADULT NURSE NOTE - NSFALLHARMRISKINTERV_ED_ALL_ED

## 2023-08-10 NOTE — H&P ADULT - CONVERSATION DETAILS
Initiated conversation regarding advance care planning w/patient and daughter Gabi ( HCP) at bedside.  Pt does not have MOLST or official advance care directives.  Will like to think it over and discuss with family first.    For now remains full code     C/w on going discussion regarding ACP

## 2023-08-10 NOTE — PATIENT PROFILE ADULT - FALL HARM RISK - HARM RISK INTERVENTIONS

## 2023-08-10 NOTE — ED PROVIDER NOTE - OBJECTIVE STATEMENT
89F with PMH of HTN, DVT, BIBEMS from home c/o L facial mass x several months, now causing difficulty seeing out of left eye. Pt is A&O x 1, [place], poor historian. Denies any pain at this time. 89F with PMH of HTN, DVT, BIBEMS from home c/o L facial mass x several months, now causing difficulty seeing out of left eye. Pt is A&O x 2, [place], poor historian. Denies any pain at this time.

## 2023-08-10 NOTE — ED PROVIDER NOTE - CLINICAL SUMMARY MEDICAL DECISION MAKING FREE TEXT BOX
Jabari Nunez MD, FACEP: In this physician's medical judgement based on clinical history and physical exam the patient's signs and symptoms lead to differential diagnoses which includes but is not limited to: malignant left facial mass and eye drainage consistent with conjunctivitis     Historical features, symptoms, and clinical exam not consistent with: sepsis    Labs were ordered and independently reviewed by me.  EKG was ordered and independently reviewed by me.  Imaging was ordered and reviewed by me.      Appropriate medications for the patient's presenting complaints were ordered, and effects were reassessed.     Patient's records including prior hospital visit, med and medical history were reviewed.   History assisted by daughter  Escalation to admission/observation was considered.    Will follow up on labs, therapeutics, imaging, reassess and disposition as clinically indicated.  *The above represents an initial assessment/impression. Please refer to my progress notes below for potential changes in patient clinical course*

## 2023-08-10 NOTE — H&P ADULT - NSHPPHYSICALEXAM_GEN_ALL_CORE
T(C): 36.7 (08-10-23 @ 20:57), Max: 36.7 (08-10-23 @ 20:57)  HR: 64 (08-10-23 @ 20:57) (64 - 78)  BP: 135/74 (08-10-23 @ 20:57) (120/73 - 135/74)  RR: 18 (08-10-23 @ 20:57) (18 - 20)  SpO2: 97% (08-10-23 @ 20:57) (97% - 98%)    CONSTITUTIONAL: Well groomed, no apparent distress  EYES: refer to optho eye exam for details, injected conjunctiva w/ pustular drainage, large   fungating mass on left forehead with purulent  drainage encroaching on periorbital region of left eye   ENMT: MMM  RESP: No respiratory distress, CTA b/l  CV: +S1S2, RRR, no peripheral edema  GI: Soft, NTND,  MSK: normal pain free ROM x4 extremities   SKIN: large fungating brown mass on left forehead with purulent drainage   NEURO: CN II-XII grossly intact; sensation intact throughout   PSYCH: Appropriate insight/judgment; A+O x 3, mood and affect appropriate

## 2023-08-10 NOTE — H&P ADULT - PROBLEM SELECTOR PLAN 3
- stable  - c/w home med - hx/o left leg DVT on coumadin   - On Coumadin 4mg x 5d & 2mg x2d   - INR 4.4, will hold coumadin   - Daily dose Coumadin based on INR (goal INR 2-3)

## 2023-08-10 NOTE — H&P ADULT - PROBLEM SELECTOR PLAN 4
- hx/o left leg DVT on coumadin   - On Coumadin 4mg x 5d & 2mg x2d   - INR 4.4, will hold coumadin   - Daily dose Coumadin based on INR (goal INR 2-3) - stable  - c/w home med

## 2023-08-10 NOTE — ED ADULT TRIAGE NOTE - CHIEF COMPLAINT QUOTE
[facility] Cardiology/Electrophyiology Consult                Date of  Admission: 5/8/2023 11:43 AM     CC/Reason for admission: Watchman evaluation     Douglas Paredes is a 80 y.o. female admitted for Permanent atrial fibrillation ([facility] [address] [I48.21]. 79yo with recent Watchman implantation presents for scheduled TANESHA. She feels well. Cardiac PMH: (Old records have been reviewed and summarized below)    Patient Active Problem List   Diagnosis    HLD (hyperlipidemia)    Premature atrial contraction    Ventricular premature beats    Esophageal reflux    Essential hypertension    Paroxysmal atrial fibrillation (HCC)    Abnormal nuclear stress test    Sinus node dysfunction (HCC)    Coronary artery disease involving native coronary artery of native heart without angina pectoris    Chronic fatigue    Symptomatic bradycardia    Permanent atrial fibrillation (HCC)    Presence of Watchman left atrial appendage closure device       Past Medical History:   Diagnosis Date    Anticoagulant long-term use     Arrhythmia     Atrial fibrillation (HCC)     Chronic back pain Many years ago    H/O complete eye exam Yearly    Dr. Grzegorz Cottrell    Headache     HLD (hyperlipidemia)     Hypertension     controlled with medication      Past Surgical History:   Procedure Laterality Date    APPENDECTOMY      CATARACT REMOVAL Bilateral 2012    EP DEVICE PROCEDURE N/A 3/14/2023    WATCHMAN AGNES CLOSURE DEVICE performed by Aga Husain MD at [facility] MAIN OR    GYN      fibroid tumors removed, 1.5 ovaries removed    ORTHOPEDIC SURGERY  06/2012    left elbow surgery    ORTHOPEDIC SURGERY  2000's    R knee scope    NE UNLISTED PROCEDURE CARDIAC SURGERY      cath    PRE-MALIGNANT / BENIGN SKIN LESION EXCISION      basal cell removed from face     Allergies   Allergen Reactions    Guaifenesin      Other reaction(s): Unknown-Unspecified    Loratadine-Pseudoephedrine Er Other (See Comments)     Other reaction(s):  Other- (not listed) - drainage from facial growth

## 2023-08-10 NOTE — H&P ADULT - ASSESSMENT
89y F pmh Basal cell Ca left cheek s/p mohs's ~4yr ago, DVT left leg on Coumadin, htn, hld, [place], macular degeneration for c/o left facial mass c/f neoplasm c/b conjunctivitis

## 2023-08-10 NOTE — H&P ADULT - PROBLEM SELECTOR PLAN 2
- Left eye injected + copious purulent drainage, c/f bacterial conjunctivitis    - S/p Optho eval, apprec rec   - Ofloxacin QID OS  - Erythromycin ointment at bedtime OS  - Culture the conjunctiva OS  - Artificial tears QID OU negative...

## 2023-08-10 NOTE — CONSULT NOTE ADULT - SUBJECTIVE AND OBJECTIVE BOX
[facility] DEPARTMENT OF OPHTHALMOLOGY - INITIAL ADULT CONSULT  ----------------------------------------------------------------------------------------------------  Stormy Burger MD, PGY-2  -------------------------------------------------------------------------------------------------    HPI: Gabi, patient's HCP whom pt lives with. Have been in contact with doctor (Abdulaziz Pérez PCP) regarding mass, only photos have been sent. Facial mass has been growing x several months, less than 1 year. Now pushing on eye. Today called EMS because eye looked infected and L eye was swollen shut. Also pt c/o pain more frequently, given tylenol for pain. Has macular degeneration. Pt has not been able to walk around since having COVID 6 months ago, unable to take pt to appointments since. (10 Aug 2023 22:38)    Interval History: Patient's daughter at bedside providing history. Reports that this AM, she noted that the patient's left eyelid looked more red and swollen, with associated discharge. Was concerned that the mass may be affecting the eye. States that the mass has been growing rapidly over the past 6 months, with periodic episodes of bleeding and pain. Has been in contact with PCP and sending photos, but has been experiencing difficulty in attending appointments given patient's bedbound status. Has hx of glaucoma with last ophtho visit being >1 yr ago.    PAST MEDICAL & SURGICAL HISTORY:  HTN (hypertension)      DVT (deep venous thrombosis)  left leg      Macular degeneration      HLD (hyperlipidemia)      Breast CA      Basal cell carcinoma      S/P lumpectomy of breast      S/P cataract extraction      S/P tonsillectomy      S/P colon resection        Past Ocular History: glaucoma (POAG?), ARMD, CE PCIOL  Ophthalmic Medications: latanoprost (daughter and patient unsure )  FAMILY HISTORY: No FHx glaucoma or ARMD  FH: breast cancer (Sibling)    MEDICATIONS  (STANDING):    MEDICATIONS  (PRN):    Allergies & Intolerances:   No Known Allergies    Review of Systems:  Constitutional: No fever, chills  Eyes: + blurry vision, discharge, and erythema. No flashes, floaters, FBS, double vision, OU  Neuro: No tremors  Cardiovascular: No chest pain, palpitations  Respiratory: No SOB, no cough  GI: No nausea, vomiting, abdominal pain  : No dysuria  Skin: no rash  Psych: no depression  Endocrine: no polyuria, polydipsia  Heme/lymph: no swelling    VITALS: T(C): 36.7 (08-10-23 @ 20:57)  T(F): 98 (08-10-23 @ 20:57), Max: 98 (08-10-23 @ 20:57)  HR: 64 (08-10-23 @ 20:57) (64 - 78)  BP: 135/74 (08-10-23 @ 20:57) (120/73 - 135/74)  RR:  (18 - 20)  SpO2:  (97% - 98%)  Wt(kg): --  General: AAO x 3, appropriate mood and affect    Ophthalmology Exam:  Visual acuity (sc): 20/40 OD, 20/200 OS (VA assessment limited 2/2 inability to properly use pinhole or place glasses iso large mass)  Pupils: +2 APD OS   Ttono: 11 OD, 18 OS  Extraocular movements (EOMs): Possible -1 restriction of abduction, although assessment limited 2/2 patient's ability to cooperate    Confrontational Visual Field (CVF): Full OD. Superotemporal defect OS (Limited 2/2 patient's ability to cooperate)  Color Plates: 10/12 OD, unable OS    Pen Light Exam (PLE)  External: Large, necrotic, friable mass on left forehead with associated episodes of bleeding with manipulation of surrounding skin   Lids/Lashes/Lacrimal Ducts: Flat OD. Large mucopurulent discharge OS. Mechanical ptosis OS.   Sclera/Conjunctiva: W+Q OD. +2-3 injection OS.   Cornea: Diffuse corneal irregularity OU (OS>OD). +1-2 SPK OU. Possible bulbar and palpebral conj epi defect OS, although assessment limited 2/2 patient's ability to cooperate   Anterior Chamber: D+F OU    Iris: Flat OU  Lens: PCIOL OU    Fundus Exam: dilated with 1% tropicamide and 2.5% phenylephrine  Approval obtained from primary team for dilation  Patient aware that pupils can remained dilated for at least 4-6 hours  Exam performed with 20D lens    Vitreous: wnl OU  Disc, cup/disc: CDR 0.6 OD, 0.8 OS   Macula: wnl OU  Vessels: wnl OU  Periphery: assessment limited 2/2 patient's limited ROM and ability to cooperate. Grossly flat and attached.     Labs/Imaging:    CT maxillofacial w/ IV contrast:   IMPRESSION: Moderate periventricular white matter ischemia. 7.5 x 2.4 x 7.0 cm dermal mass in the LEFT forehead and periorbital region suspicious for a neoplasm.

## 2023-08-10 NOTE — H&P ADULT - PROBLEM SELECTOR PLAN 1
- Presents w/ growing mass on left forehead x months , hx/o basal cell Ca   - Now has large fungating mass on left forehead with pustular drainage encroaching   on periorbital region of left eye  - CTH & maxillofacial: 7.5 x 2.4 x 7.0 cm dermal mass in the LEFT forehead and   periorbital region suspicious for a neoplasm  - Given proximity to eye and ocular manifestation, optho consulted apprec rec   - MRI face & orbit w/ CTX   - Plastic surg, head & neck surg consult to be called in AM    - Wound care consult ordered   - Prn pain control - Presents w/ growing mass on left forehead x months , hx/o basal cell Ca   - Now has large fungating mass on left forehead with pustular drainage encroaching   on periorbital region of left eye  - CTH & maxillofacial: 7.5 x 2.4 x 7.0 cm dermal mass in the LEFT forehead and   periorbital region suspicious for a neoplasm  - Given proximity to eye and ocular manifestation, optho consulted apprec rec   - MRI face & orbit w/ CTX   - Plastic surg, head & neck surg consult to be called in AM    - Wound care consult ordered

## 2023-08-10 NOTE — ED PROVIDER NOTE - LEFT FACE
+ large fungating mass to L side of forehead extending to L eye causing L eye obstruction, unable to fully open L eye, +yellow/green drainage from L eye + large baseball sized fungating mass to L side of forehead extending to L eye causing L eye obstruction, unable to fully open L eye, +yellow/green drainage from L eye + large baseball sized fungating mass to L side of forehead extending to L eye causing L eye obstruction, unable to fully open L eye, +purulent drainage from L eye

## 2023-08-10 NOTE — ED ADULT NURSE NOTE - OBJECTIVE STATEMENT
89 y/o F A&Ox2 w/ unknown PMH presents to ED with fungating mass. Per EMS, mass has been growing over the last few months and MD is "aware." Pt states that she came in because growth is starting to obstruct vision. Pt is noted to have a large brown mass over her left eye. Purulent discharge noted to mass and over the eye. Pt endorses slight headache. Pt denies fever, chills, N/V/D, blurred vision, sob, chest pain, urinary symptoms.

## 2023-08-10 NOTE — ED PROVIDER NOTE - PROGRESS NOTE DETAILS
JILL Pelletier: Spoke with daughterGabi, patient's HCP. Lives with daughter. Have been in contact w doctor (Abdulaziz Pérez PCP) regarding mass, only photos have been sent. Facial mass has been growing x several months, less than 1 year. Now pushing on eye. Today called because looked infected and L eye was swollen shut. Also pt c/o pain more frequently, given tylenol for pain. Has macular degeneration. Pt has not been able to walk around since having COVID 6 months ago.   PMH: DVT on coumadin, HTN, HLD, macular degeneration Photos sent via Teams to Optho resident Dr. Scott. - Dayana Pelletier PA-C Optho requesting CT orbits with contrast, will see patient with oculoplastics attg. - Dayana Pelletier PA-C CT orbits cancelled by CT department, called CT tech, reports radiology advised to cancel because orbits are viewed with CT brain and CT max/face. - LAST CarrilloC JILL Pelletier: Spoke with daughter, Gabi, patient's HCP whom pt lives with. Have been in contact with doctor (Abdulaziz Pérez PCP) regarding mass, only photos have been sent. Facial mass has been growing x several months, less than 1 year. Now pushing on eye. Today called EMS because eye looked infected and L eye was swollen shut. Also pt c/o pain more frequently, given tylenol for pain. Has macular degeneration. Pt has not been able to walk around since having COVID 6 months ago.   PMH: DVT on coumadin, HTN, HLD, macular degeneration JILL Pelletier: Spoke with daughterGabi, patient's HCP whom pt lives with. Have been in contact with doctor (Abdulaziz Pérez PCP) regarding mass, only photos have been sent. Facial mass has been growing x several months, less than 1 year. Now pushing on eye. Today called EMS because eye looked infected and L eye was swollen shut. Also pt c/o pain more frequently, given tylenol for pain. Has macular degeneration. Pt has not been able to walk around since having COVID 6 months ago, unable to take pt to appointments since. Pt's daughter amenable for patient to be admitted for further care.  PMH: DVT on coumadin, HTN, HLD, macular degeneration D/w optho, possible conjunctivitis but no concern for mass intruding on orbit, pending final recs. CT results reviewed revealing a dermal mass, no orbit involvement. Admission d/w hospitalist. - Dayana Pelletier PA-C

## 2023-08-10 NOTE — PATIENT PROFILE ADULT - NSFALLSECTIONLABEL_GEN_A_CORE

## 2023-08-11 DIAGNOSIS — H35.30 UNSPECIFIED MACULAR DEGENERATION: ICD-10-CM

## 2023-08-11 DIAGNOSIS — H10.9 UNSPECIFIED CONJUNCTIVITIS: ICD-10-CM

## 2023-08-11 DIAGNOSIS — R22.0 LOCALIZED SWELLING, MASS AND LUMP, HEAD: ICD-10-CM

## 2023-08-11 DIAGNOSIS — L98.9 DISORDER OF THE SKIN AND SUBCUTANEOUS TISSUE, UNSPECIFIED: ICD-10-CM

## 2023-08-11 DIAGNOSIS — I10 ESSENTIAL (PRIMARY) HYPERTENSION: ICD-10-CM

## 2023-08-11 DIAGNOSIS — I82.409 ACUTE EMBOLISM AND THROMBOSIS OF UNSPECIFIED DEEP VEINS OF UNSPECIFIED LOWER EXTREMITY: ICD-10-CM

## 2023-08-11 LAB
ANION GAP SERPL CALC-SCNC: 12 MMOL/L — SIGNIFICANT CHANGE UP (ref 5–17)
BUN SERPL-MCNC: 18 MG/DL — SIGNIFICANT CHANGE UP (ref 7–23)
CALCIUM SERPL-MCNC: 10 MG/DL — SIGNIFICANT CHANGE UP (ref 8.4–10.5)
CHLORIDE SERPL-SCNC: 104 MMOL/L — SIGNIFICANT CHANGE UP (ref 96–108)
CO2 SERPL-SCNC: 23 MMOL/L — SIGNIFICANT CHANGE UP (ref 22–31)
CREAT SERPL-MCNC: 0.86 MG/DL — SIGNIFICANT CHANGE UP (ref 0.5–1.3)
EGFR: 60 ML/MIN/1.73M2 — SIGNIFICANT CHANGE UP
GLUCOSE SERPL-MCNC: 157 MG/DL — HIGH (ref 70–99)
GRAM STN FLD: SIGNIFICANT CHANGE UP
HCT VFR BLD CALC: 35.3 % — SIGNIFICANT CHANGE UP (ref 34.5–45)
HGB BLD-MCNC: 11.2 G/DL — LOW (ref 11.5–15.5)
INR BLD: 2.93 RATIO — HIGH (ref 0.85–1.18)
MCHC RBC-ENTMCNC: 28.3 PG — SIGNIFICANT CHANGE UP (ref 27–34)
MCHC RBC-ENTMCNC: 31.7 GM/DL — LOW (ref 32–36)
MCV RBC AUTO: 89.1 FL — SIGNIFICANT CHANGE UP (ref 80–100)
NRBC # BLD: 0 /100 WBCS — SIGNIFICANT CHANGE UP (ref 0–0)
PLATELET # BLD AUTO: 382 K/UL — SIGNIFICANT CHANGE UP (ref 150–400)
POTASSIUM SERPL-MCNC: 4.7 MMOL/L — SIGNIFICANT CHANGE UP (ref 3.5–5.3)
POTASSIUM SERPL-SCNC: 4.7 MMOL/L — SIGNIFICANT CHANGE UP (ref 3.5–5.3)
PROTHROM AB SERPL-ACNC: 29.8 SEC — HIGH (ref 9.5–13)
RBC # BLD: 3.96 M/UL — SIGNIFICANT CHANGE UP (ref 3.8–5.2)
RBC # FLD: 13.7 % — SIGNIFICANT CHANGE UP (ref 10.3–14.5)
SODIUM SERPL-SCNC: 139 MMOL/L — SIGNIFICANT CHANGE UP (ref 135–145)
SPECIMEN SOURCE: SIGNIFICANT CHANGE UP
WBC # BLD: 10.48 K/UL — SIGNIFICANT CHANGE UP (ref 3.8–10.5)
WBC # FLD AUTO: 10.48 K/UL — SIGNIFICANT CHANGE UP (ref 3.8–10.5)

## 2023-08-11 PROCEDURE — 99222 1ST HOSP IP/OBS MODERATE 55: CPT | Mod: FS

## 2023-08-11 PROCEDURE — 99232 SBSQ HOSP IP/OBS MODERATE 35: CPT | Mod: GC

## 2023-08-11 RX ORDER — SENNA PLUS 8.6 MG/1
2 TABLET ORAL AT BEDTIME
Refills: 0 | Status: DISCONTINUED | OUTPATIENT
Start: 2023-08-11 | End: 2023-08-16

## 2023-08-11 RX ORDER — ONDANSETRON 8 MG/1
4 TABLET, FILM COATED ORAL EVERY 8 HOURS
Refills: 0 | Status: DISCONTINUED | OUTPATIENT
Start: 2023-08-11 | End: 2023-08-16

## 2023-08-11 RX ORDER — CHLORHEXIDINE GLUCONATE 213 G/1000ML
1 SOLUTION TOPICAL DAILY
Refills: 0 | Status: DISCONTINUED | OUTPATIENT
Start: 2023-08-11 | End: 2023-08-16

## 2023-08-11 RX ORDER — LANOLIN ALCOHOL/MO/W.PET/CERES
3 CREAM (GRAM) TOPICAL AT BEDTIME
Refills: 0 | Status: DISCONTINUED | OUTPATIENT
Start: 2023-08-11 | End: 2023-08-16

## 2023-08-11 RX ORDER — ACETAMINOPHEN 500 MG
650 TABLET ORAL EVERY 6 HOURS
Refills: 0 | Status: DISCONTINUED | OUTPATIENT
Start: 2023-08-11 | End: 2023-08-16

## 2023-08-11 RX ORDER — NYSTATIN CREAM 100000 [USP'U]/G
1 CREAM TOPICAL
Refills: 0 | Status: DISCONTINUED | OUTPATIENT
Start: 2023-08-11 | End: 2023-08-16

## 2023-08-11 RX ORDER — ATENOLOL 25 MG/1
25 TABLET ORAL DAILY
Refills: 0 | Status: DISCONTINUED | OUTPATIENT
Start: 2023-08-11 | End: 2023-08-16

## 2023-08-11 RX ORDER — CALCIUM CARBONATE 500(1250)
2 TABLET ORAL
Refills: 0 | DISCHARGE

## 2023-08-11 RX ORDER — ASCORBIC ACID 60 MG
500 TABLET,CHEWABLE ORAL DAILY
Refills: 0 | Status: DISCONTINUED | OUTPATIENT
Start: 2023-08-11 | End: 2023-08-16

## 2023-08-11 RX ORDER — LATANOPROST 0.05 MG/ML
1 SOLUTION/ DROPS OPHTHALMIC; TOPICAL AT BEDTIME
Refills: 0 | Status: DISCONTINUED | OUTPATIENT
Start: 2023-08-11 | End: 2023-08-16

## 2023-08-11 RX ORDER — CHOLECALCIFEROL (VITAMIN D3) 125 MCG
1000 CAPSULE ORAL DAILY
Refills: 0 | Status: DISCONTINUED | OUTPATIENT
Start: 2023-08-11 | End: 2023-08-16

## 2023-08-11 RX ORDER — ENOXAPARIN SODIUM 100 MG/ML
30 INJECTION SUBCUTANEOUS EVERY 24 HOURS
Refills: 0 | Status: DISCONTINUED | OUTPATIENT
Start: 2023-08-11 | End: 2023-08-12

## 2023-08-11 RX ADMIN — Medication 1 DROP(S): at 23:59

## 2023-08-11 RX ADMIN — Medication 1 DROP(S): at 06:12

## 2023-08-11 RX ADMIN — ENOXAPARIN SODIUM 30 MILLIGRAM(S): 100 INJECTION SUBCUTANEOUS at 14:18

## 2023-08-11 RX ADMIN — Medication 1 TABLET(S): at 17:07

## 2023-08-11 RX ADMIN — Medication 1 APPLICATION(S): at 21:12

## 2023-08-11 RX ADMIN — Medication 1 TABLET(S): at 11:12

## 2023-08-11 RX ADMIN — ATENOLOL 25 MILLIGRAM(S): 25 TABLET ORAL at 06:13

## 2023-08-11 RX ADMIN — Medication 1 DROP(S): at 11:12

## 2023-08-11 RX ADMIN — Medication 1 DROP(S): at 17:07

## 2023-08-11 RX ADMIN — NYSTATIN CREAM 1 APPLICATION(S): 100000 CREAM TOPICAL at 17:07

## 2023-08-11 RX ADMIN — Medication 1000 UNIT(S): at 11:12

## 2023-08-11 RX ADMIN — LATANOPROST 1 DROP(S): 0.05 SOLUTION/ DROPS OPHTHALMIC; TOPICAL at 21:12

## 2023-08-11 RX ADMIN — Medication 1 DROP(S): at 17:08

## 2023-08-11 RX ADMIN — Medication 1 DROP(S): at 01:30

## 2023-08-11 RX ADMIN — Medication 500 MILLIGRAM(S): at 17:07

## 2023-08-11 RX ADMIN — CHLORHEXIDINE GLUCONATE 1 APPLICATION(S): 213 SOLUTION TOPICAL at 11:13

## 2023-08-11 RX ADMIN — Medication 1 DROP(S): at 11:13

## 2023-08-11 RX ADMIN — NYSTATIN CREAM 1 APPLICATION(S): 100000 CREAM TOPICAL at 06:12

## 2023-08-11 NOTE — CONSULT NOTE ADULT - SUBJECTIVE AND OBJECTIVE BOX
Patient is a 89 y old  Female who presents with a chief complaint of " 89y F pmh Basal cell Ca left cheek s/p mohs's ~4yr ago, DVT left leg on Coumadin, htn, hld, [place], macular degeneration p/w daughter/ HCP Gabi at bedside who pt lives w/ for c/o left facial mass. Pt has growing facial mass on left forehead xmonths. Have been in contact with doctor (Abdulaziz Pérez PCP) regarding mass, who have been doing watchful waiting of mass. Mass has now grown to point that it is pushing on eye, and recently in past few days hs been associated w/ pustular drainage. At home daughter has been cleaning mass w/ peroxide and applying OTC abx ointment. Pt now experiencing increased  pain/discomfort ( moderately relieved w/Tyelenol), vision obstruction, which prompted pt and family to come to ED for eval. Pt has had limited mobility since getting COVID 6 months ago, so daughter unable to take pt to appointment for eval."     (11 Aug 2023 11:44)        HPI:   89y F pmh Basal cell Ca left cheek s/p mohs's ~4yr ago, DVT left leg on Coumadin, htn, hld, [place], macular degeneration p/w daughter/ HCP Gabi at bedside who pt lives w/ for c/o left facial mass. Pt has growing facial mass on left forehead xmonths. Have been in contact with doctor (Abdulaziz Pérez PCP) regarding mass, who have been doing watchful waiting of mass. Mass has now grown to point that it is pushing on eye, and recently in past few days hs been associated w/ pustular drainage. At home daughter has been cleaning mass w/ peroxide and applying OTC abx ointment. Pt now experiencing increased  pain/discomfort ( moderately relieved w/Tyelenol), vision obstruction, which prompted pt and family to come to ED for eval. Pt has had limited mobility since getting COVID 6 months ago, so daughter unable to take pt to appointment for eval.      ROS: Denies CP, SOB, palpitation, N/V/D, fever, cough, chills, dizziness, abm pain, recent travel, sick contact, change in bowel and urinary habits     A 10-system ROS was performed and is negative except as noted above and/or in the HPI.     (10 Aug 2023 22:38)        T(C): 36.8 (08-11-23 @ 14:34), Max: 36.8 (08-11-23 @ 05:25)  HR: 69 (08-11-23 @ 14:34) (64 - 78)  BP: 102/48 (08-11-23 @ 14:34) (102/48 - 136/85)  RR: 19 (08-11-23 @ 14:34) (18 - 20)  SpO2: 100% (08-11-23 @ 14:34) (95% - 100%)  Wt(kg): --    PHYSICAL EXAM:    GENERAL: Comfortable, no acute distress  HEAD:  Atraumatic, Normocephalic. Exophytic mass over the left upper lid 7.5 x 7.0 cm in diamtere, brown with stuck on appearance with oozy pus  NECK: Supple, No JVD  NERVOUS SYSTEM:  Alert & Oriented X3, Motor Strength 5/5 B/L upper and lower extremities  CHEST/LUNG: Clear to auscultation bilaterally  HEART: Regular rate and rhythm; No murmurs, rubs, or gallops  ABDOMEN: Soft, Nontender, Nondistended; Bowel sounds present      LABS:                        11.2   10.48 )-----------( 382      ( 11 Aug 2023 06:28 )             35.3     08-11    139  |  104  |  18  ----------------------------<  157<H>  4.7   |  23  |  0.86    Ca    10.0      11 Aug 2023 06:28    TPro  6.5  /  Alb  3.4  /  TBili  0.1<L>  /  DBili  x   /  AST  18  /  ALT  17  /  AlkPhos  94  08-10    PT/INR - ( 11 Aug 2023 06:28 )   PT: 29.8 sec;   INR: 2.93 ratio         PTT - ( 10 Aug 2023 15:59 )  PTT:44.5 sec  Urinalysis Basic - ( 11 Aug 2023 06:28 )    Color: x / Appearance: x / SG: x / pH: x  Gluc: 157 mg/dL / Ketone: x  / Bili: x / Urobili: x   Blood: x / Protein: x / Nitrite: x   Leuk Esterase: x / RBC: x / WBC x   Sq Epi: x / Non Sq Epi: x / Bacteria: x        CAPILLARY BLOOD GLUCOSE          CAPILLARY BLOOD GLUCOSE        CAPILLARY BLOOD GLUCOSE                RECENT CULTURES:                MEDS  acetaminophen     Tablet .. 650 milliGRAM(s) Oral every 6 hours PRN  aluminum hydroxide/magnesium hydroxide/simethicone Suspension 30 milliLiter(s) Oral every 4 hours PRN  artificial tears (preservative free) Ophthalmic Solution 1 Drop(s) Both EYES four times a day  ascorbic acid 500 milliGRAM(s) Oral daily  atenolol  Tablet 25 milliGRAM(s) Oral daily  chlorhexidine 4% Liquid 1 Application(s) Topical daily  cholecalciferol 1000 Unit(s) Oral daily  enoxaparin Injectable 30 milliGRAM(s) SubCutaneous every 24 hours  erythromycin   Ointment 1 Application(s) Left EYE at bedtime  latanoprost 0.005% Ophthalmic Solution 1 Drop(s) Both EYES at bedtime  melatonin 3 milliGRAM(s) Oral at bedtime PRN  multivitamin 1 Tablet(s) Oral daily  nystatin Powder 1 Application(s) Topical two times a day  ofloxacin 0.3% Solution 1 Drop(s) Left EYE four times a day  ondansetron Injectable 4 milliGRAM(s) IV Push every 8 hours PRN  senna 2 Tablet(s) Oral at bedtime  trimethoprim   80 mG/sulfamethoxazole 400 mG 1 Tablet(s) Oral two times a day

## 2023-08-11 NOTE — PROGRESS NOTE ADULT - ASSESSMENT
89y F pmh Basal cell Ca left cheek s/p mohs's ~4yr ago, DVT left leg on Coumadin, htn, hld, [place], macular degeneration for c/o left facial mass c/f neoplasm c/b conjunctivitis      89y F pmh Basal cell Ca left cheek s/p mohs's ~4yr ago, DVT left leg on Coumadin, htn, hld, [place], macular degeneration for c/o left facial mass c/f neoplasm c/b conjunctivitis. Left fungating mass, 7 cm x 2 cm x 7 cm,  growing from left forehead with swelling of left eye. First noticed by family 7 months ago. Maxilofacial CT shows orbits intact, no invasion of mass into the orbits at this time. Conjunctival cultures obtained for conjunctivitis. Given rapid rate of growth of tumor as well as necrotic features, growing mass concerning for neoplasm, SCC vs BCC.

## 2023-08-11 NOTE — DIETITIAN INITIAL EVALUATION ADULT - PERTINENT LABORATORY DATA
08-11    139  |  104  |  18  ----------------------------<  157<H>  4.7   |  23  |  0.86    Ca    10.0      11 Aug 2023 06:28    TPro  6.5  /  Alb  3.4  /  TBili  0.1<L>  /  DBili  x   /  AST  18  /  ALT  17  /  AlkPhos  94  08-10

## 2023-08-11 NOTE — DIETITIAN INITIAL EVALUATION ADULT - PERTINENT MEDS FT
MEDICATIONS  (STANDING):  artificial tears (preservative free) Ophthalmic Solution 1 Drop(s) Both EYES four times a day  atenolol  Tablet 25 milliGRAM(s) Oral daily  chlorhexidine 4% Liquid 1 Application(s) Topical daily  cholecalciferol 1000 Unit(s) Oral daily  erythromycin   Ointment 1 Application(s) Left EYE at bedtime  multivitamin 1 Tablet(s) Oral daily  nystatin Powder 1 Application(s) Topical two times a day  ofloxacin 0.3% Solution 1 Drop(s) Left EYE four times a day  senna 2 Tablet(s) Oral at bedtime    MEDICATIONS  (PRN):  acetaminophen     Tablet .. 650 milliGRAM(s) Oral every 6 hours PRN Temp greater or equal to 38C (100.4F), Mild Pain (1 - 3)  aluminum hydroxide/magnesium hydroxide/simethicone Suspension 30 milliLiter(s) Oral every 4 hours PRN Dyspepsia  melatonin 3 milliGRAM(s) Oral at bedtime PRN Insomnia  ondansetron Injectable 4 milliGRAM(s) IV Push every 8 hours PRN Nausea and/or Vomiting

## 2023-08-11 NOTE — PROGRESS NOTE ADULT - PROBLEM SELECTOR PLAN 3
- hx/o left leg DVT on coumadin   - On Coumadin 4mg x 5d & 2mg x2d   - INR 4.4, will hold coumadin   - Daily dose Coumadin based on INR (goal INR 2-3) - hx/o left leg DVT on coumadin   - On Coumadin 4mg x 5d & 2mg x2d   - INR 4.4 and possible surgery, will hold coumadin and other AC for now

## 2023-08-11 NOTE — DIETITIAN INITIAL EVALUATION ADULT - PROBLEM SELECTOR PLAN 2
- Left eye injected + copious purulent drainage, c/f bacterial conjunctivitis    - S/p Optho eval, apprec rec   - Ofloxacin QID OS  - Erythromycin ointment at bedtime OS  - Culture the conjunctiva OS  - Artificial tears QID OU

## 2023-08-11 NOTE — PROGRESS NOTE ADULT - SUBJECTIVE AND OBJECTIVE BOX
PROGRESS NOTE:     Patient is a 89y old  Female who presents with a chief complaint of facial mass (10 Aug 2023 22:44)      SUBJECTIVE / OVERNIGHT EVENTS:    ADDITIONAL REVIEW OF SYSTEMS: 10 point ROS negative except per HPI    MEDICATIONS  (STANDING):  artificial tears (preservative free) Ophthalmic Solution 1 Drop(s) Both EYES four times a day  atenolol  Tablet 25 milliGRAM(s) Oral daily  cholecalciferol 1000 Unit(s) Oral daily  erythromycin   Ointment 1 Application(s) Left EYE at bedtime  multivitamin 1 Tablet(s) Oral daily  nystatin Powder 1 Application(s) Topical two times a day  ofloxacin 0.3% Solution 1 Drop(s) Left EYE four times a day  senna 2 Tablet(s) Oral at bedtime    MEDICATIONS  (PRN):  acetaminophen     Tablet .. 650 milliGRAM(s) Oral every 6 hours PRN Temp greater or equal to 38C (100.4F), Mild Pain (1 - 3)  aluminum hydroxide/magnesium hydroxide/simethicone Suspension 30 milliLiter(s) Oral every 4 hours PRN Dyspepsia  melatonin 3 milliGRAM(s) Oral at bedtime PRN Insomnia  ondansetron Injectable 4 milliGRAM(s) IV Push every 8 hours PRN Nausea and/or Vomiting      CAPILLARY BLOOD GLUCOSE        I&O's Summary      PHYSICAL EXAM:  Vital Signs Last 24 Hrs  T(C): 36.8 (11 Aug 2023 05:25), Max: 36.8 (11 Aug 2023 05:25)  T(F): 98.3 (11 Aug 2023 05:25), Max: 98.3 (11 Aug 2023 05:25)  HR: 78 (11 Aug 2023 05:25) (64 - 78)  BP: 120/76 (11 Aug 2023 05:25) (120/73 - 136/85)  BP(mean): --  RR: 20 (11 Aug 2023 05:25) (18 - 20)  SpO2: 95% (11 Aug 2023 05:25) (95% - 99%)    Parameters below as of 11 Aug 2023 05:25  Patient On (Oxygen Delivery Method): room air        CONSTITUTIONAL: NAD, well-developed, A&Ox3 to person, place, time.  RESPIRATORY: Normal respiratory effort; lungs are clear to auscultation bilaterally  CARDIOVASCULAR: Regular rate and rhythm, normal S1 and S2, no murmur/rub/gallop; No lower extremity edema; Peripheral pulses are 2+ bilaterally  ABDOMEN: Nontender to palpation, no rebound/guarding; No hepatosplenomegaly  MUSCLOSKELETAL: no clubbing or cyanosis of digits; no joint swelling or tenderness to palpation  NEURO: CN 2-12 grossly intact, moves all limbs spontaneously      LABS:                          11.2   10.48 )-----------( 382      ( 11 Aug 2023 06:28 )             35.3     08-11    139  |  104  |  18  ----------------------------<  157<H>  4.7   |  23  |  0.86    Ca    10.0      11 Aug 2023 06:28    TPro  6.5  /  Alb  3.4  /  TBili  0.1<L>  /  DBili  x   /  AST  18  /  ALT  17  /  AlkPhos  94  08-10    PT/INR - ( 11 Aug 2023 06:28 )   PT: 29.8 sec;   INR: 2.93 ratio         PTT - ( 10 Aug 2023 15:59 )  PTT:44.5 sec      -----    MICRO  Urinalysis Basic - ( 11 Aug 2023 06:28 )    Color: x / Appearance: x / SG: x / pH: x  Gluc: 157 mg/dL / Ketone: x  / Bili: x / Urobili: x   Blood: x / Protein: x / Nitrite: x   Leuk Esterase: x / RBC: x / WBC x   Sq Epi: x / Non Sq Epi: x / Bacteria: x        -----    TRENDS  Hemoglobin: 11.2 g/dL (08-11 @ 06:28)  Hemoglobin: 11.3 g/dL (08-10 @ 15:59)    Creatinine Trend: 0.86<--, 0.80<--  ----  RADIOLOGY & ADDITIONAL TESTS:  Results Reviewed:   Imaging Personally Reviewed:  Electrocardiogram Personally Reviewed:    COORDINATION OF CARE:  Care Discussed with Consultants/Other Providers [Y/N]:  Prior or Outpatient Records Reviewed [Y/N]:   PROGRESS NOTE:     Patient is a 89y old  Female who presents with a chief complaint of facial mass (10 Aug 2023 22:44)      SUBJECTIVE / OVERNIGHT EVENTS: Overnight, no acute events. This morning, seen at bedside, in no acute distress. Large mass with purulent discharge and strong odor growing from the left side of the forehead, concerning for neoplastic growth. Patient demonstrates difficulty hearing. Daughter called for collateral. Mass was first noticed 7 months ago. At that point, pcp and family made decision to monitor for growth or until it begins to impact patient's quality of life. One week ago, daughter began to notice purulent, foul-smelling discharge from the mass as well as swelling of the left eye. Daughter brought patient to hospital after she noticed that patient was unable to open her left eye.     ADDITIONAL REVIEW OF SYSTEMS: 10 point ROS negative except per HPI    MEDICATIONS  (STANDING):  artificial tears (preservative free) Ophthalmic Solution 1 Drop(s) Both EYES four times a day  atenolol  Tablet 25 milliGRAM(s) Oral daily  cholecalciferol 1000 Unit(s) Oral daily  erythromycin   Ointment 1 Application(s) Left EYE at bedtime  multivitamin 1 Tablet(s) Oral daily  nystatin Powder 1 Application(s) Topical two times a day  ofloxacin 0.3% Solution 1 Drop(s) Left EYE four times a day  senna 2 Tablet(s) Oral at bedtime    MEDICATIONS  (PRN):  acetaminophen     Tablet .. 650 milliGRAM(s) Oral every 6 hours PRN Temp greater or equal to 38C (100.4F), Mild Pain (1 - 3)  aluminum hydroxide/magnesium hydroxide/simethicone Suspension 30 milliLiter(s) Oral every 4 hours PRN Dyspepsia  melatonin 3 milliGRAM(s) Oral at bedtime PRN Insomnia  ondansetron Injectable 4 milliGRAM(s) IV Push every 8 hours PRN Nausea and/or Vomiting    PHYSICAL EXAM:  Vital Signs Last 24 Hrs  T(C): 36.8 (11 Aug 2023 05:25), Max: 36.8 (11 Aug 2023 05:25)  T(F): 98.3 (11 Aug 2023 05:25), Max: 98.3 (11 Aug 2023 05:25)  HR: 78 (11 Aug 2023 05:25) (64 - 78)  BP: 120/76 (11 Aug 2023 05:25) (120/73 - 136/85)  BP(mean): --  RR: 20 (11 Aug 2023 05:25) (18 - 20)  SpO2: 95% (11 Aug 2023 05:25) (95% - 99%)    Parameters below as of 11 Aug 2023 05:25  Patient On (Oxygen Delivery Method): room air      CONSTITUTIONAL: NAD, well-developed. difficult of hearing  RESPIRATORY: Normal respiratory effort; lungs are clear to auscultation bilaterally  CARDIOVASCULAR: Regular rate and rhythm, normal S1 and S2, no murmur/rub/gallop  ABDOMEN: Soft, nondistended, nontender to palpation, no rebound/guarding;   MUSCLOSKELETAL: no clubbing or cyanosis of digits  NEURO: CN 2-12 grossly intact, moves all limbs spontaneously      LABS:                          11.2   10.48 )-----------( 382      ( 11 Aug 2023 06:28 )             35.3     08-11    139  |  104  |  18  ----------------------------<  157<H>  4.7   |  23  |  0.86    Ca    10.0      11 Aug 2023 06:28    TPro  6.5  /  Alb  3.4  /  TBili  0.1<L>  /  DBili  x   /  AST  18  /  ALT  17  /  AlkPhos  94  08-10    PT/INR - ( 11 Aug 2023 06:28 )   PT: 29.8 sec;   INR: 2.93 ratio      PTT - ( 10 Aug 2023 15:59 )  PTT:44.5 sec    -----    MICRO  Urinalysis Basic - ( 11 Aug 2023 06:28 )    Color: x / Appearance: x / SG: x / pH: x  Gluc: 157 mg/dL / Ketone: x  / Bili: x / Urobili: x   Blood: x / Protein: x / Nitrite: x   Leuk Esterase: x / RBC: x / WBC x   Sq Epi: x / Non Sq Epi: x / Bacteria: x    -----    TRENDS  Hemoglobin: 11.2 g/dL (08-11 @ 06:28)  Hemoglobin: 11.3 g/dL (08-10 @ 15:59)    Creatinine Trend: 0.86<--, 0.80<--  ----  RADIOLOGY & ADDITIONAL TESTS:    CT Maxillofacial w/ IV Cont (08.10.23 @ 20:10)    IMPRESSION:  Moderate periventricular white matter ischemia.    7.5 x 2.4   x 7.0 cm dermal mass in the LEFT forehead and periorbital region   suspicious for a neoplasm.

## 2023-08-11 NOTE — CONSULT NOTE ADULT - ASSESSMENT
89 year old patient with right heel DTI  - Patient seen and evaluated  - Right Heel DTI noted with no open lesions, no signs of infection in feet  - Recommened z flow boots in bed at all times  - Podiatry signing off at this time, reconsult as needed Internal Medicine

## 2023-08-11 NOTE — DIETITIAN INITIAL EVALUATION ADULT - REASON FOR ADMISSION
" 89y F pmh Basal cell Ca left cheek s/p mohs's ~4yr ago, DVT left leg on Coumadin, htn, hld, [place], macular degeneration p/w daughter/ HCP Gabi at bedside who pt lives w/ for c/o left facial mass. Pt has growing facial mass on left forehead xmonths. Have been in contact with doctor (Abdulaziz Pérez PCP) regarding mass, who have been doing watchful waiting of mass. Mass has now grown to point that it is pushing on eye, and recently in past few days hs been associated w/ pustular drainage. At home daughter has been cleaning mass w/ peroxide and applying OTC abx ointment. Pt now experiencing increased  pain/discomfort ( moderately relieved w/Tyelenol), vision obstruction, which prompted pt and family to come to ED for eval. Pt has had limited mobility since getting COVID 6 months ago, so daughter unable to take pt to appointment for eval."

## 2023-08-11 NOTE — CONSULT NOTE ADULT - SUBJECTIVE AND OBJECTIVE BOX
Patient is a 89y old  Female who presents with a chief complaint of facial mass (11 Aug 2023 16:08)      HPI:   89y F pmh Basal cell Ca left cheek s/p mohs's ~4yr ago, DVT left leg on Coumadin, htn, hld, [place], macular degeneration p/w daughter/ HCP Gabi at bedside who pt lives w/ for c/o left facial mass. Pt has growing facial mass on left forehead xmonths. Have been in contact with doctor (Abdulaziz Pérez PCP) regarding mass, who have been doing watchful waiting of mass. Mass has now grown to point that it is pushing on eye, and recently in past few days hs been associated w/ pustular drainage. At home daughter has been cleaning mass w/ peroxide and applying OTC abx ointment. Pt now experiencing increased  pain/discomfort ( moderately relieved w/Tyelenol), vision obstruction, which prompted pt and family to come to ED for eval. Pt has had limited mobility since getting COVID 6 months ago, so daughter unable to take pt to appointment for eval.      ROS: Denies CP, SOB, palpitation, N/V/D, fever, cough, chills, dizziness, abm pain, recent travel, sick contact, change in bowel and urinary habits     A 10-system ROS was performed and is negative except as noted above and/or in the HPI.     (10 Aug 2023 22:38)      PAST MEDICAL & SURGICAL HISTORY:  HTN (hypertension)      DVT (deep venous thrombosis)  left leg      Macular degeneration      HLD (hyperlipidemia)      Breast CA      Basal cell carcinoma      S/P lumpectomy of breast      S/P cataract extraction      S/P tonsillectomy      S/P colon resection          MEDICATIONS  (STANDING):  artificial tears (preservative free) Ophthalmic Solution 1 Drop(s) Both EYES four times a day  ascorbic acid 500 milliGRAM(s) Oral daily  atenolol  Tablet 25 milliGRAM(s) Oral daily  chlorhexidine 4% Liquid 1 Application(s) Topical daily  cholecalciferol 1000 Unit(s) Oral daily  enoxaparin Injectable 30 milliGRAM(s) SubCutaneous every 24 hours  erythromycin   Ointment 1 Application(s) Left EYE at bedtime  latanoprost 0.005% Ophthalmic Solution 1 Drop(s) Both EYES at bedtime  multivitamin 1 Tablet(s) Oral daily  nystatin Powder 1 Application(s) Topical two times a day  ofloxacin 0.3% Solution 1 Drop(s) Left EYE four times a day  senna 2 Tablet(s) Oral at bedtime  trimethoprim   80 mG/sulfamethoxazole 400 mG 1 Tablet(s) Oral two times a day    MEDICATIONS  (PRN):  acetaminophen     Tablet .. 650 milliGRAM(s) Oral every 6 hours PRN Temp greater or equal to 38C (100.4F), Mild Pain (1 - 3)  aluminum hydroxide/magnesium hydroxide/simethicone Suspension 30 milliLiter(s) Oral every 4 hours PRN Dyspepsia  melatonin 3 milliGRAM(s) Oral at bedtime PRN Insomnia  ondansetron Injectable 4 milliGRAM(s) IV Push every 8 hours PRN Nausea and/or Vomiting      Allergies    No Known Allergies    Intolerances        VITALS:    Vital Signs Last 24 Hrs  T(C): 36.9 (11 Aug 2023 22:45), Max: 36.9 (11 Aug 2023 22:45)  T(F): 98.5 (11 Aug 2023 22:45), Max: 98.5 (11 Aug 2023 22:45)  HR: 75 (11 Aug 2023 22:45) (69 - 78)  BP: 95/61 (11 Aug 2023 22:45) (95/61 - 120/76)  BP(mean): --  RR: 18 (11 Aug 2023 22:45) (18 - 20)  SpO2: 96% (11 Aug 2023 22:45) (95% - 100%)    Parameters below as of 11 Aug 2023 22:45  Patient On (Oxygen Delivery Method): room air        LABS:                          11.2   10.48 )-----------( 382      ( 11 Aug 2023 06:28 )             35.3       08-11    139  |  104  |  18  ----------------------------<  157<H>  4.7   |  23  |  0.86    Ca    10.0      11 Aug 2023 06:28    TPro  6.5  /  Alb  3.4  /  TBili  0.1<L>  /  DBili  x   /  AST  18  /  ALT  17  /  AlkPhos  94  08-10      CAPILLARY BLOOD GLUCOSE          PT/INR - ( 11 Aug 2023 06:28 )   PT: 29.8 sec;   INR: 2.93 ratio         PTT - ( 10 Aug 2023 15:59 )  PTT:44.5 sec    LOWER EXTREMITY PHYSICAL EXAM:    Vascular: DP/PT 0/4, B/L, CFT <3 seconds B/L, Temperature gradient WNL, B/L.   Neuro: unable to assess   Musculoskeletal/Ortho: no gross deformities   Skin: right heel blanching erythema consistent with mild heel DTI secondary to pressure present on admission, no open lesions, no signs of infection

## 2023-08-11 NOTE — DIETITIAN INITIAL EVALUATION ADULT - NSFNSPHYEXAMSKINFT_GEN_A_CORE
Pressure Injury 1: Right:, heel, Stage I  Pressure Injury 2: Left:, heel, Stage I  Pressure Injury 3: Right:, buttocks, Suspected deep tissue injury  Pressure Injury 4: Left:, buttocks, Suspected deep tissue injury

## 2023-08-11 NOTE — DIETITIAN INITIAL EVALUATION ADULT - PROBLEM SELECTOR PLAN 3
- hx/o left leg DVT on coumadin   - On Coumadin 4mg x 5d & 2mg x2d   - INR 4.4, will hold coumadin   - Daily dose Coumadin based on INR (goal INR 2-3)

## 2023-08-11 NOTE — PROGRESS NOTE ADULT - PROBLEM SELECTOR PLAN 2
- Left eye injected + copious purulent drainage, c/f bacterial conjunctivitis    - S/p Optho eval, apprec rec   - Ofloxacin QID OS  - Erythromycin ointment at bedtime OS  - Culture the conjunctiva OS  - Artificial tears QID OU - Left eye injected + copious purulent drainage, c/f bacterial conjunctivitis    - S/p Optho eval, apprec rec   - Ofloxacin QID OS  - Erythromycin ointment at bedtime OS  - Culture the conjunctiva OS  - Artificial tears QID OU  -Latanoprost eyedrops for increased IOP

## 2023-08-11 NOTE — DIETITIAN INITIAL EVALUATION ADULT - PROBLEM SELECTOR PLAN 1
- Presents w/ growing mass on left forehead x months , hx/o basal cell Ca   - Now has large fungating mass on left forehead with pustular drainage encroaching   on periorbital region of left eye  - CTH & maxillofacial: 7.5 x 2.4 x 7.0 cm dermal mass in the LEFT forehead and   periorbital region suspicious for a neoplasm  - Given proximity to eye and ocular manifestation, optho consulted apprec rec   - MRI face & orbit w/ CTX   - Plastic surg, head & neck surg consult to be called in AM    - Wound care consult ordered

## 2023-08-11 NOTE — CONSULT NOTE ADULT - ASSESSMENT
89y F pmh Basal cell Ca left cheek s/p mohs's ~4yr ago, DVT left leg on Coumadin, htn, hld, [place], macular degeneration p/w left facial mass. Pt has growing facial mass on left forehead  6 months. Her PCP was doing watchful waiting of mass. Mass has now grown to point that it is pushing on eye, and recently in past few days hs been associated w/ pustular drainage.  Patient now experiencing increased  pain/discomfort ( moderately relieved w/Tyelenol), vision obstruction, which prompted pt and family to come to ED for eval. Pt has had limited mobility since getting COVID 6 months ago. CT scan shows 7.5 x 2.4 x 7.0 cm dermal mass in the LEFT forehead and periorbital region suspicious for a neoplasm.    Plan:  - No acute surgical intervention at this time.  - Discuss goals of care with the family regarding surgical intervention  - If surgery aligns with the patient's goal of care may benefit from a biopsy  - Outpatient follow up    Discussed with the surgical oncology fellow on behalf of the attending    [facility]  [id number]

## 2023-08-11 NOTE — CONSULT NOTE ADULT - SUBJECTIVE AND OBJECTIVE BOX
Wound SURGERY CONSULT NOTE    HPI:   89y F pmh Basal cell Ca left cheek s/p mohs's ~4yr ago, DVT left leg on Coumadin, htn, hld, [place], macular degeneration p/w daughter/ HCP Gabi at bedside who pt lives w/ for c/o left facial mass. Pt has growing facial mass on left forehead xmonths. Have been in contact with doctor (Abdulaziz Pérez PCP) regarding mass, who have been doing watchful waiting of mass. Mass has now grown to point that it is pushing on eye, and recently in past few days hs been associated w/ pustular drainage. At home daughter has been cleaning mass w/ peroxide and applying OTC abx ointment. Pt now experiencing increased  pain/discomfort ( moderately relieved w/Tyelenol), vision obstruction, which prompted pt and family to come to ED for eval. Pt has had limited mobility since getting COVID 6 months ago, so daughter unable to take pt to appointment for eval.      ROS: Denies CP, SOB, palpitation, N/V/D, fever, cough, chills, dizziness, abm pain, recent travel, sick contact, change in bowel and urinary habits     A 10-system ROS was performed and is negative except as noted above and/or in the HPI.     (10 Aug 2023 22:38)        N/V/D,  BM/ Flatus,   NGT,     palp/ sob/dyspnea/ cp,       F/C/S  Wound consult requested by team to assist w/ management of      wound/ pressure injury.   Pt (unable to)  c/o pain, drainage, odor, color change,  or worsening swelling. Offloading and pericare initiated upon admission as pt Increasingly sedentary 2/2 to illness. Pt is Incontinent of urine & stool. (+)alvarez/ ostomy.   No h/o bites, scratches, falls, trauma.  Pt seen by Wound RN  CAVILON Advance/  Ace,TRIAD/ Alvaro/ medihoney/ Allevyn foam/ dakins/ Adaptic/ DSD recommended used at home/ while awaiting consult.  Appetite good/ decreased.  weight loss.  S&S / RD consult appreciated All questions asked and answered to pt's and family's expressed understanding and satisfaction.    Current Diet: Diet, Regular (08-11-23 @ 00:15)      PAST MEDICAL & SURGICAL HISTORY:  HTN (hypertension)      DVT (deep venous thrombosis)  left leg      Macular degeneration      HLD (hyperlipidemia)      Breast CA      Basal cell carcinoma      S/P lumpectomy of breast      S/P cataract extraction      S/P tonsillectomy      S/P colon resection          REVIEW OF SYSTEMS: Pt unable to offer  General/ Breast/ Skin/Vasc/ Neuro/ MSK: see HPI  All other systems negative    MEDICATIONS  (STANDING):  artificial tears (preservative free) Ophthalmic Solution 1 Drop(s) Both EYES four times a day  atenolol  Tablet 25 milliGRAM(s) Oral daily  chlorhexidine 4% Liquid 1 Application(s) Topical daily  cholecalciferol 1000 Unit(s) Oral daily  erythromycin   Ointment 1 Application(s) Left EYE at bedtime  multivitamin 1 Tablet(s) Oral daily  nystatin Powder 1 Application(s) Topical two times a day  ofloxacin 0.3% Solution 1 Drop(s) Left EYE four times a day  senna 2 Tablet(s) Oral at bedtime    MEDICATIONS  (PRN):  acetaminophen     Tablet .. 650 milliGRAM(s) Oral every 6 hours PRN Temp greater or equal to 38C (100.4F), Mild Pain (1 - 3)  aluminum hydroxide/magnesium hydroxide/simethicone Suspension 30 milliLiter(s) Oral every 4 hours PRN Dyspepsia  melatonin 3 milliGRAM(s) Oral at bedtime PRN Insomnia  ondansetron Injectable 4 milliGRAM(s) IV Push every 8 hours PRN Nausea and/or Vomiting      Allergies    No Known Allergies    Intolerances        SOCIAL HISTORY:  / /single/ ; (+)HHA/ lives in SNF; Former smoker, No current/ Denies smoking, ETOH, drugs    FAMILY HISTORY:  FH: breast cancer (Sibling)     no h/o PVD or wound healing or skin/ significant problems    PHYSICAL EXAM:  Vital Signs Last 24 Hrs  T(C): 36.8 (11 Aug 2023 05:25), Max: 36.8 (11 Aug 2023 05:25)  T(F): 98.3 (11 Aug 2023 05:25), Max: 98.3 (11 Aug 2023 05:25)  HR: 78 (11 Aug 2023 05:25) (64 - 78)  BP: 120/76 (11 Aug 2023 05:25) (120/73 - 136/85)  BP(mean): --  RR: 20 (11 Aug 2023 05:25) (18 - 20)  SpO2: 95% (11 Aug 2023 05:25) (95% - 99%)    Parameters below as of 11 Aug 2023 05:25  Patient On (Oxygen Delivery Method): room air        NAD, Guarded but stable,  A&Ox3/ Alert/ Confused  cachectic/ thin, MO/ Obese, frail,  WD/ WN/ WG,  Disheveled  Total Care Sport/ Versa Care P500 / Envella Progressa bed     HEENT:  NC/AT, PERRL, EOMI, sclera clear, mucosa moist, throat clear, trachea midline, neck supple, trach  Respiratory: nonlabored w/ equal chest rise  Gastrointestinal: soft NT/ND (+)BS  (+)PEG (+)ostomy (+)NGT  : (+)alvarez/ purewick/ condom cath  Neurology:  weakened strength & sensation grossly intact, paraesthesia  nonverbal, no follow commands, paraplegic  Psych: calm/ appropriate/ flat affect/ easily agitated/ restless/ anxious/ difficult to assess  Musculoskeletal:  limited stiff / p/FROM, no deformities/ contractures  Vascular: BLE equally warm/ cool,  no cyanosis, clubbing, edema nor acute ischemia           >LE //BLE edema equal           BLE DP/PT pulses palpable          BLE hemosiderin staining/ varicose veins  Skin:  moist w/ good turgor  thin, dry, pale, frail,  ecchymosis w/o hematoma  blistering  or serosanguinous drainage  No odor, erythema, increased warmth, tenderness, induration, fluctuance, nor crepitus    LABS/ CULTURES/ RADIOLOGY:                        11.2   10.48 )-----------( 382      ( 11 Aug 2023 06:28 )             35.3       139  |  104  |  18  ----------------------------<  157      [08-11-23 @ 06:28]  4.7   |  23  |  0.86        Ca     10.0     [08-11-23 @ 06:28]    TPro  6.5  /  Alb  3.4  /  TBili  0.1  /  DBili  x   /  AST  18  /  ALT  17  /  AlkPhos  94  [08-10-23 @ 15:59]    PT/INR: PT 29.8 , INR 2.93       [08-11-23 @ 06:28]  PTT: 44.5       [08-10-23 @ 15:59]                              A/P:    Wound Consult requested to assist w/ management of    BLE elevation & Compression  Consider EDSON/PVR, Duplex, Xray, A/P BLE CT or MRI  Abx per Medicine/ ID  Moisturize intact skin w/ SWEEN cream BID  Nutrition Consult for optimization in pt w/ Severe Protein Calorie Malnutrition,        Inadequate PO intake, & Increased nutritional needs            encourage high quality protein, vamsi/ prosource, MVI & Vit C to promote wound healing  Hyperglycemia - improving w/ ADA diet and Lantus/ NPH & FS w/ ISS, consider Endo Consult, consider HgA1c  Anemia- improving w/ transfusions, Fe studies, protonix  Continue turning and positioning w/ offloading assistive devices as per protocol  Buttocks/ Sacrum Ace/ TRIAD BID /CAVILON ADVANCE TIW and prn soiling        Continue w/ attends under pads and Pericare w/ alvarez/ condom cath maintenance / purewick care as per protocol  Waffle Cushion to chair when oob to chair  Continue w/ low air loss pressure redistribution bed surface   Pt will need Group 2 mattress on hospital bed and ROHO cushion for wheel chair upon discharge home  Care as per medicine, will follow w/ you/ remain available as requested  Upon discharge f/u as outpatient at [facility] Center [address] [place] [address] [phone]  Seen w/ attng & RN and D/w team & RN  Thank you for this consult  Kathy Schumacher PA-C CWS [id number]  Nights/ Weekends/ Holidays please call:  General Surgery Consult pager ([phone]) for emergencies  Wound PT for multilayer leg wrapping or VAC issues (x [phone])      Wound SURGERY CONSULT NOTE    HPI:  89y F pmh Basal cell Ca left cheek s/p mohs's ~4yr ago, DVT left leg on Coumadin, htn, hld, [place], macular degeneration p/w daughter/ HCP Gabi at bedside who pt lives w/ for c/o left facial mass. Pt has growing facial mass on left forehead several months. Have been in contact with doctor (Abdulaziz Pérez PCP) regarding mass, who have been doing watchful waiting of mass. Mass has now grown to point that it is pushing on eye, and recently in past few days hs been associated w/ pustular drainage. At home daughter has been cleaning mass w/ peroxide and applying OTC abx ointment. Pt now experiencing increased pain/discomfort ( moderately relieved w/Tyelenol), vision obstruction, which prompted pt and family to come to ED for eval. Pt has had limited mobility since getting COVID 6 months ago, so daughter unable to take pt to appointment for eval.  Denies CP, SOB, palpitation, N/V/D, fever, cough, chills, dizziness, abm pain, recent travel, sick contact, change in bowel and urinary habits   Wound consult requested by team to assist w/ management of forehead and buttocks wound. Offloading and pericare initiated upon admission as pt Increasingly sedentary 2/2 to illness. Pt is Incontinent of urine & stool. No h/o bites, scratches, falls, trauma.  Appetite good w/o weight loss.  All questions asked and answered to pt's family's expressed understanding and satisfaction.    Current Diet: Diet, Regular (08-11-23 @ 00:15)      PAST MEDICAL & SURGICAL HISTORY:  HTN (hypertension)    DVT (deep venous thrombosis)    Macular degeneration    HLD (hyperlipidemia)    Breast CA, S/P lumpectomy of breast    Basal cell carcinoma    S/P cataract extraction    S/P tonsillectomy    S/P colon resection      REVIEW OF SYSTEMS: General/ Skin/Vasc/ MSK: see HPI  All other systems negative    MEDICATIONS  (STANDING):  artificial tears (preservative free) Ophthalmic Solution 1 Drop(s) Both EYES four times a day  atenolol  Tablet 25 milliGRAM(s) Oral daily  chlorhexidine 4% Liquid 1 Application(s) Topical daily  cholecalciferol 1000 Unit(s) Oral daily  erythromycin   Ointment 1 Application(s) Left EYE at bedtime  multivitamin 1 Tablet(s) Oral daily  nystatin Powder 1 Application(s) Topical two times a day  ofloxacin 0.3% Solution 1 Drop(s) Left EYE four times a day  senna 2 Tablet(s) Oral at bedtime    MEDICATIONS  (PRN):  acetaminophen  Tablet 650 milliGRAM(s) Oral every 6 hours PRN Temp greater or equal to 38C (100.4F), Mild Pain (1 - 3)  aluminum hydroxide/magnesium hydroxide/simethicone Suspension 30 milliLiter(s) Oral every 4 hours PRN Dyspepsia  melatonin 3 milliGRAM(s) Oral at bedtime PRN Insomnia  ondansetron Injectable 4 milliGRAM(s) IV Push every 8 hours PRN Nausea and/or Vomiting      No Known Allergies    SOCIAL HISTORY:  ; No current smoking, ETOH, drugs    FAMILY HISTORY:FH: breast cancer (Sibling)   no h/o PVD or wound healing or skin problems    PHYSICAL EXAM:  Vital Signs Last 24 Hrs  T(C): 36.8 (11 Aug 2023 05:25), Max: 36.8 (11 Aug 2023 05:25)  T(F): 98.3 (11 Aug 2023 05:25), Max: 98.3 (11 Aug 2023 05:25)  HR: 78 (11 Aug 2023 05:25) (64 - 78)  BP: 120/76 (11 Aug 2023 05:25) (120/73 - 136/85)  BP(mean): --  RR: 20 (11 Aug 2023 05:25) (18 - 20)  SpO2: 95% (11 Aug 2023 05:25) (95% - 99%)    Parameters below as of 11 Aug 2023 05:25  Patient On (Oxygen Delivery Method): room air      NAD,  Alert, frail,  WD/ WN/ Disheveled  Versa Care P500 bed  HEENT:  Lt side of Forehead w/ fungating lesion w/ serous crusting and creamy drainage     Lt eyelid difficult to lift open limiting vision- no odor      Rt eye- EOMI, sclera clear, mucosa moist, throat clear, trachea midline, neck supple  Respiratory: nonlabored w/ equal chest rise  Gastrointestinal: soft NT/ND   : (+) purewick  Neurology:  weakened strength & sensation grossly intact  Psych: calm/ appropriate  Musculoskeletal: FROM, no deformities/ contractures  Vascular: BLE equally warm,  no cyanosis, clubbing, nor acute ischemia           BLE edema equal  Skin:  thin, dry, pale, frail,  ecchymosis w/o hematoma  Bilateral buttocks w/ hypo/ hyperpigmented skin c/w chronic moisture     no blistering  or drainage  No odor, erythema, increased warmth, tenderness, induration, fluctuance, nor crepitus    LABS/ CULTURES/ RADIOLOGY:                        11.2   10.48 )-----------( 382      ( 11 Aug 2023 06:28 )             35.3       139  |  104  |  18  ----------------------------<  157      [08-11-23 @ 06:28]  4.7   |  23  |  0.86        Ca     10.0     [08-11-23 @ 06:28]    TPro  6.5  /  Alb  3.4  /  TBili  0.1  /  DBili  x   /  AST  18  /  ALT  17  /  AlkPhos  94  [08-10-23 @ 15:59]      PT/INR: PT 29.8 , INR 2.93       [08-11-23 @ 06:28]  PTT: 44.5       [08-10-23 @ 15:59]      < from: CT Maxillofacial w/ IV Cont (08.10.23 @ 20:10) >  ACC: [id number] EXAM:  CT MAXILLOFACIAL  IC   ORDERED BY: ELIN HORNER     ACC: [id number] EXAM:  CT BRAIN IC   ORDERED BY: ELIN HORNER     PROCEDURE DATE:  08/10/2023          INTERPRETATION:  CT head with and without IV contrast    CLINICAL INFORMATION: LEFT forehead growth    TECHNIQUE: Contiguous axial  4 mm sections were obtained through the head   both preceding and following the intravenous administration of 90 cc of   Omnipaque 350/ 10 cc discarded.   This scan was performed using automatic   exposure control (radiation dose reduction software) to obtain a   diagnostic image quality scan with patient dose as low as reasonably   achievable.    FINDINGS:  No previous examinations are available for review.    The brain demonstrates moderate periventricular white matter ischemia.    No acute cerebral cortical infarct is seen.  No intracranial hemorrhage   is found.    The ventricles, sulci and basal cisterns appear unremarkable.    The vertebral and internal carotid arteries demonstrate expected   enhancement indicating their patency.    The orbits are unremarkable.  The paranasal sinuses are clear.  The nasal   cavity remains intact.  The nasopharynx is symmetric.  The central skull   base, petrous temporal bones and calvarium remain intact.    Images demonstrate the presence of a lobulated 7.5 x 2.4 x 7.0 cm dermal   mass in the LEFT forehead and periorbital region suspicious for a   neoplasm. The underlying calvarium appears intact.      IMPRESSION:  Moderate periventricular white matter ischemia.    7.5 x 2.4   x 7.0 cm dermal mass in the LEFT forehead and periorbital region   suspicious for a neoplasm.      < end of copied text >

## 2023-08-11 NOTE — DIETITIAN INITIAL EVALUATION ADULT - ADD RECOMMEND
1) Continue regular diet   2) Add Ensure Plus High Protein BID   3) Add multivitamin and vitamin daily   4) Monitor PO intake, diet tolerance, weight trends, labs, GI function, and skin integrity    Yessenia Rey MS, RDN, CDN (Teams/Pager #[phone])

## 2023-08-11 NOTE — CONSULT NOTE ADULT - ASSESSMENT
89y F pmh Basal cell Ca left cheek s/p mohs's ~4yr ago, DVT left leg on Coumadin, htn, hld, [place], macular degeneration for c/o left facial mass c/f neoplasm c/b conjunctivitis. Left fungating mass, 7 cm x 2 cm x 7 cm,  growing from left forehead with swelling of left eye. First noticed by family 7 months ago. Maxilofacial CT shows orbits intact, no invasion of mass into the orbits at this time. Conjunctival cultures obtained for conjunctivitis. Given rapid rate of growth of tumor as well as necrotic features, growing mass concerning for neoplasm, SCC vs BCC.      Wound Consult requested to assist w/ management of Forehead neoplasm  Incontinence Associated Dermatitis    Forehead- Consider Plastics vs Head&Neck Surgery vs OMFS as in line w/ GOC  Maxillofacial CT as above-   Moisturize intact skin w/ SWEEN cream BID  Nutrition Consult for optimization as in line w/ GOC        encourage high quality protein, MVI & Vit C to promote wound healing  Continue turning and positioning w/ offloading assistive devices as per protocol  Buttocks/ Sacrum Ace BID and prn soiling        Continue w/ attends under pads and Pericare w/  purewick care as per protocol  Waffle Cushion to chair when oob to chair  Continue w/ low air loss pressure redistribution bed surface   Care as per medicine, remain available as requested  Upon discharge f/u as outpatient at [facility] [address] [place], [place] [phone]  Seen w/ attng & RN and D/w team   Thank you for this consult  Kathy Schumacher PA-C CWS [id number]  Nights/ Weekends/ Holidays please call:  General Surgery Consult pager ([phone]) for emergencies  Wound PT for multilayer leg wrapping or VAC issues (x [phone])   I spent 55 minutes face to face w/ this pt of which more than 50% of the time was spent counseling & coordinating care of this pt.

## 2023-08-11 NOTE — PROGRESS NOTE ADULT - CONVERSATION DETAILS
Initiated conversation regarding advance care planning w/patient and daughter Gabi ( HCP) at bedside.  Pt does not have MOLST or official advance care directives.  Will like to think it over and discuss with family first.    For now remains full code     C/w on going discussion regarding ACP URSZULA discussed with family members; stated that at this point, pt. would like to be full code. They would like to entertain options for superficial surgical excision of the mass that do not involve general anesthesia.

## 2023-08-11 NOTE — PROGRESS NOTE ADULT - PROBLEM SELECTOR PLAN 1
- Presents w/ growing mass on left forehead x months , hx/o basal cell Ca   - Now has large fungating mass on left forehead with pustular drainage encroaching   on periorbital region of left eye  - CTH & maxillofacial: 7.5 x 2.4 x 7.0 cm dermal mass in the LEFT forehead and   periorbital region suspicious for a neoplasm  - Given proximity to eye and ocular manifestation, optho consulted apprec rec   - MRI face & orbit w/ CTX   - Plastic surg, head & neck surg consult to be called in AM    - Wound care consult ordered - Presents w/ growing mass on left forehead x months , hx/o basal cell Ca   - Now has large fungating mass on left forehead with pustular drainage encroaching   on periorbital region of left eye  - CTH & maxillofacial: 7.5 x 2.4 x 7.0 cm dermal mass in the LEFT forehead and   periorbital region suspicious for a neoplasm  - Given proximity to eye and ocular manifestation, optho consulted; recommending MRI face & orbit w/ CTX   - Surg onc consulted in AM  - Wound care consult ordered

## 2023-08-11 NOTE — DIETITIAN INITIAL EVALUATION ADULT - OTHER INFO
GI/Intake:   -Tolerating regular diet   -Last BM documented ; bowel regimen ordered (Senna)     Onc:   -Hx of basal cell CA  -Now presenting with mass of skin of head - Onc/wound to follow     Weight Hx:   -Current dosin pounds   -No additional weights noted

## 2023-08-12 ENCOUNTER — TRANSCRIPTION ENCOUNTER (OUTPATIENT)
Age: 88
End: 2023-08-12

## 2023-08-12 DIAGNOSIS — Z29.9 ENCOUNTER FOR PROPHYLACTIC MEASURES, UNSPECIFIED: ICD-10-CM

## 2023-08-12 LAB
ANION GAP SERPL CALC-SCNC: 11 MMOL/L — SIGNIFICANT CHANGE UP (ref 5–17)
BASOPHILS # BLD AUTO: 0.05 K/UL — SIGNIFICANT CHANGE UP (ref 0–0.2)
BASOPHILS NFR BLD AUTO: 0.7 % — SIGNIFICANT CHANGE UP (ref 0–2)
BUN SERPL-MCNC: 20 MG/DL — SIGNIFICANT CHANGE UP (ref 7–23)
CALCIUM SERPL-MCNC: 9.7 MG/DL — SIGNIFICANT CHANGE UP (ref 8.4–10.5)
CHLORIDE SERPL-SCNC: 104 MMOL/L — SIGNIFICANT CHANGE UP (ref 96–108)
CO2 SERPL-SCNC: 24 MMOL/L — SIGNIFICANT CHANGE UP (ref 22–31)
CREAT SERPL-MCNC: 1.04 MG/DL — SIGNIFICANT CHANGE UP (ref 0.5–1.3)
EGFR: 48 ML/MIN/1.73M2 — LOW
EOSINOPHIL # BLD AUTO: 0.21 K/UL — SIGNIFICANT CHANGE UP (ref 0–0.5)
EOSINOPHIL NFR BLD AUTO: 2.7 % — SIGNIFICANT CHANGE UP (ref 0–6)
GLUCOSE SERPL-MCNC: 135 MG/DL — HIGH (ref 70–99)
HCT VFR BLD CALC: 33.2 % — LOW (ref 34.5–45)
HGB BLD-MCNC: 10.3 G/DL — LOW (ref 11.5–15.5)
IMM GRANULOCYTES NFR BLD AUTO: 0.5 % — SIGNIFICANT CHANGE UP (ref 0–0.9)
INR BLD: 2.61 RATIO — HIGH (ref 0.85–1.18)
LYMPHOCYTES # BLD AUTO: 1.45 K/UL — SIGNIFICANT CHANGE UP (ref 1–3.3)
LYMPHOCYTES # BLD AUTO: 19 % — SIGNIFICANT CHANGE UP (ref 13–44)
MAGNESIUM SERPL-MCNC: 2.2 MG/DL — SIGNIFICANT CHANGE UP (ref 1.6–2.6)
MCHC RBC-ENTMCNC: 28 PG — SIGNIFICANT CHANGE UP (ref 27–34)
MCHC RBC-ENTMCNC: 31 GM/DL — LOW (ref 32–36)
MCV RBC AUTO: 90.2 FL — SIGNIFICANT CHANGE UP (ref 80–100)
MONOCYTES # BLD AUTO: 0.61 K/UL — SIGNIFICANT CHANGE UP (ref 0–0.9)
MONOCYTES NFR BLD AUTO: 8 % — SIGNIFICANT CHANGE UP (ref 2–14)
MRSA PCR RESULT.: SIGNIFICANT CHANGE UP
NEUTROPHILS # BLD AUTO: 5.28 K/UL — SIGNIFICANT CHANGE UP (ref 1.8–7.4)
NEUTROPHILS NFR BLD AUTO: 69.1 % — SIGNIFICANT CHANGE UP (ref 43–77)
NRBC # BLD: 0 /100 WBCS — SIGNIFICANT CHANGE UP (ref 0–0)
PHOSPHATE SERPL-MCNC: 3.1 MG/DL — SIGNIFICANT CHANGE UP (ref 2.5–4.5)
PLATELET # BLD AUTO: 367 K/UL — SIGNIFICANT CHANGE UP (ref 150–400)
POTASSIUM SERPL-MCNC: 4.2 MMOL/L — SIGNIFICANT CHANGE UP (ref 3.5–5.3)
POTASSIUM SERPL-SCNC: 4.2 MMOL/L — SIGNIFICANT CHANGE UP (ref 3.5–5.3)
PROTHROM AB SERPL-ACNC: 27.9 SEC — HIGH (ref 9.5–13)
RBC # BLD: 3.68 M/UL — LOW (ref 3.8–5.2)
RBC # FLD: 13.9 % — SIGNIFICANT CHANGE UP (ref 10.3–14.5)
S AUREUS DNA NOSE QL NAA+PROBE: DETECTED
SODIUM SERPL-SCNC: 139 MMOL/L — SIGNIFICANT CHANGE UP (ref 135–145)
WBC # BLD: 7.64 K/UL — SIGNIFICANT CHANGE UP (ref 3.8–10.5)
WBC # FLD AUTO: 7.64 K/UL — SIGNIFICANT CHANGE UP (ref 3.8–10.5)

## 2023-08-12 PROCEDURE — 99233 SBSQ HOSP IP/OBS HIGH 50: CPT | Mod: GC

## 2023-08-12 RX ORDER — ENOXAPARIN SODIUM 100 MG/ML
40 INJECTION SUBCUTANEOUS EVERY 24 HOURS
Refills: 0 | Status: DISCONTINUED | OUTPATIENT
Start: 2023-08-12 | End: 2023-08-16

## 2023-08-12 RX ORDER — ACETAMINOPHEN 500 MG
650 TABLET ORAL ONCE
Refills: 0 | Status: COMPLETED | OUTPATIENT
Start: 2023-08-12 | End: 2023-08-12

## 2023-08-12 RX ADMIN — Medication 650 MILLIGRAM(S): at 01:12

## 2023-08-12 RX ADMIN — Medication 1 TABLET(S): at 17:32

## 2023-08-12 RX ADMIN — Medication 1 DROP(S): at 23:54

## 2023-08-12 RX ADMIN — Medication 1 DROP(S): at 17:32

## 2023-08-12 RX ADMIN — NYSTATIN CREAM 1 APPLICATION(S): 100000 CREAM TOPICAL at 05:05

## 2023-08-12 RX ADMIN — Medication 1 DROP(S): at 17:33

## 2023-08-12 RX ADMIN — ATENOLOL 25 MILLIGRAM(S): 25 TABLET ORAL at 05:06

## 2023-08-12 RX ADMIN — Medication 650 MILLIGRAM(S): at 22:41

## 2023-08-12 RX ADMIN — Medication 1 DROP(S): at 13:53

## 2023-08-12 RX ADMIN — Medication 1000 UNIT(S): at 13:52

## 2023-08-12 RX ADMIN — CHLORHEXIDINE GLUCONATE 1 APPLICATION(S): 213 SOLUTION TOPICAL at 13:53

## 2023-08-12 RX ADMIN — LATANOPROST 1 DROP(S): 0.05 SOLUTION/ DROPS OPHTHALMIC; TOPICAL at 22:04

## 2023-08-12 RX ADMIN — Medication 260 MILLIGRAM(S): at 05:02

## 2023-08-12 RX ADMIN — Medication 1 TABLET(S): at 13:52

## 2023-08-12 RX ADMIN — Medication 1 APPLICATION(S): at 22:04

## 2023-08-12 RX ADMIN — Medication 3 MILLIGRAM(S): at 22:09

## 2023-08-12 RX ADMIN — NYSTATIN CREAM 1 APPLICATION(S): 100000 CREAM TOPICAL at 17:32

## 2023-08-12 RX ADMIN — Medication 650 MILLIGRAM(S): at 22:06

## 2023-08-12 RX ADMIN — Medication 1 DROP(S): at 05:06

## 2023-08-12 RX ADMIN — Medication 1 DROP(S): at 05:05

## 2023-08-12 RX ADMIN — Medication 500 MILLIGRAM(S): at 13:52

## 2023-08-12 RX ADMIN — Medication 650 MILLIGRAM(S): at 06:00

## 2023-08-12 RX ADMIN — Medication 650 MILLIGRAM(S): at 00:38

## 2023-08-12 RX ADMIN — Medication 1 TABLET(S): at 05:07

## 2023-08-12 RX ADMIN — ENOXAPARIN SODIUM 40 MILLIGRAM(S): 100 INJECTION SUBCUTANEOUS at 22:09

## 2023-08-12 NOTE — DISCHARGE NOTE PROVIDER - NSDCFUADDAPPT_GEN_ALL_CORE_FT
APPTS ARE READY TO BE MADE: [X] YES    Best Family or Patient Contact (if needed): Daughter Alexandru): [phone]    Additional Information about above appointments (if needed):    1: Please follow-up with your primary care physician in 1-2 weeks for a post-hospitalization visit.   APPTS ARE READY TO BE MADE: [X] YES    Best Family or Patient Contact (if needed): Daughter Alexandru): [phone]    Additional Information about above appointments (if needed):    1: Please follow-up with your primary care physician in 1-2 weeks for a post-hospitalization visit.  2: Please make an opthalmology appointment: [address] [place] [address]. [facility] [address]  [place] [address]  [phone]  3: Please make an appointment with Surgical oncology at: [phone]

## 2023-08-12 NOTE — DISCHARGE NOTE PROVIDER - HOSPITAL COURSE
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 7 cm x 2 cm x 7 cm, with associated periorbital edema. Mass was first noticed by family 7 months ago. Maxilofacial CT on 8/11 with no invasion of mass into the orbits at this time, but in setting of periorbital edema and conjunctival injection, conjunctival cultures obtained, and MRI ordered per ophthalmology recommendations in ED.     Hospital Course:  Palliative care consult was placed for assistance in goals of care discussion with patient's daughter. Surgical oncology was consulted with recommendation for no need for inpatient surgical intervention given patient's age and risk factors.    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 7 cm x 2 cm x 7 cm, with associated periorbital edema. Mass was first noticed by family 7 months ago. Maxilofacial CT on 8/11 with no invasion of mass into the orbits at this time, but in setting of periorbital edema and conjunctival injection, conjunctival cultures obtained and patient was started on abx eye drops to continue outpt as source control will be difficult to be achieve as purulence from the mass drips into the eye. MRI of the head was unable to be obtained as patient became agitated in the machine and sedation was not given due to age and co-morbidities. After GOC discussion and coordination with pt's daughter Gabi and palliative care, IM, gen surg, plastics, and surg/onc, pt and her family were made aware that surgicial excision of the mass would not be feasible during this inpatient admission due to patient's age and high risk of bleeding due to location of mass. If the pt and family wanted to pursue surgicial exicision, they were advised that biospy of the mass would have to occur outpatient and they would have to explore surgical options outpatient.     Important Medication Changes and Reason:  Patient has a hx of DVT but was unclear when it was dx. Coumadin was stopped on this admission due to increased bleeding risk and after b/l duplex scans showed no evidence of a clot at this time.    Active of Pending issues Requiring Follow up:  -outpatient biopsy of facial mass if patient/family opts to do it     Discharge Diagnoses:  -facial mass suspicious for SCC

## 2023-08-12 NOTE — PROGRESS NOTE ADULT - PROBLEM SELECTOR PLAN 2
- Left eye injected + copious purulent drainage, c/f bacterial conjunctivitis    - S/p Optho eval, apprec rec   - Ofloxacin QID OS  - Erythromycin ointment at bedtime OS  - Culture the conjunctiva OS  - Artificial tears QID OU  -Latanoprost eyedrops for increased IOP Left eye with copious purulent drainage, c/f bacterial conjunctivitis      Plan:  -F/u conjunctival culture  -Ophthalmology as above  -Ofloxacin QID OS  -Erythromycin ointment QHS OS  -Artificial tears QID OU  -Latanoprost OS for increased IOP

## 2023-08-12 NOTE — PROGRESS NOTE ADULT - PROBLEM SELECTOR PLAN 1
- Presents w/ growing mass on left forehead x months , hx/o basal cell Ca   - Now has large fungating mass on left forehead with pustular drainage encroaching   on periorbital region of left eye  - CTH & maxillofacial: 7.5 x 2.4 x 7.0 cm dermal mass in the LEFT forehead and   periorbital region suspicious for a neoplasm  - Given proximity to eye and ocular manifestation, optho consulted; recommending MRI face & orbit w/ CTX   - Surg onc consulted in AM  - Wound care consult ordered CT Head and Maxillofacial 8/11/23: 7.5 x 2.4 x 7.0 cm dermal mass in the left forehead and periorbital region suspicious for a neoplasm    Plan:  -F/u MRI  -Ophthalmology on board, appreciate recommendations  -Surgical oncology on board, appreciate recommendations  -Wound care consult, appreciate recommendations  -Goals of care discussion initiated with daughter on 8/11 by primary team given patient's age to discuss risk versus benefit of any possible intervention

## 2023-08-12 NOTE — DISCHARGE NOTE PROVIDER - CARE PROVIDER_API CALL
Abdulaziz Pérez  Nephrology  [address] [place] [address], [address]  [place] [address]  Phone: [phone]  Fax: [phone]  Established Patient  Follow Up Time:

## 2023-08-12 NOTE — DISCHARGE NOTE PROVIDER - NSDCMRMEDTOKEN_GEN_ALL_CORE_FT
atenolol 25 mg oral tablet: 1 tab(s) orally once a day  cholecalciferol oral tablet: 1000 unit(s) orally once a day  Lutein 20 mg oral capsule: 1 cap(s) orally once a day  Multiple Vitamins oral tablet: 1 tab(s) orally once a day  Norvasc 5 mg oral tablet: 1 tab(s) orally once a day  warfarin 4 mg oral tablet: 1 tab(s) orally once a day for 6 days a week, 2mg once a week on Tuesday   atenolol 25 mg oral tablet: 1 tab(s) orally once a day  cholecalciferol oral tablet: 1000 unit(s) orally once a day  erythromycin 0.5% ophthalmic ointment: 1 application to each affected eye once a day (at bedtime)  latanoprost 0.005% ophthalmic solution: 1 drop(s) to each affected eye once a day (at bedtime)  Multiple Vitamins oral tablet: 1 tab(s) orally once a day  Norvasc 5 mg oral tablet: 1 tab(s) orally once a day  ocular lubricant ophthalmic solution: 1 drop(s) to each affected eye 4 times a day  ofloxacin 0.3% ophthalmic solution: 1 drop(s) to each affected eye 4 times a day  sulfamethoxazole-trimethoprim 400 mg-80 mg oral tablet: 1 tab(s) orally 2 times a day

## 2023-08-12 NOTE — PROGRESS NOTE ADULT - PROBLEM SELECTOR PLAN 5
- Takes Lutein at home, non formulary Takes Lutein at home    Plan:  -Non-formulary, patient to resume upon discharge

## 2023-08-12 NOTE — PROGRESS NOTE ADULT - PROBLEM SELECTOR PLAN 4
- stable  - c/w home med BP stable    Plan:   -C/w home atenolol 25 mg daily  -Continue to monitor BP

## 2023-08-12 NOTE — DISCHARGE NOTE PROVIDER - NSDCCPTREATMENT_GEN_ALL_CORE_FT
PRINCIPAL PROCEDURE  Procedure: CT head  Findings and Treatment: CT Head and Maxillofacial on 8/10:  IMPRESSION:  Moderate periventricular white matter ischemia. 7.5 x 2.4   x 7.0 cm dermal mass in the LEFT forehead and periorbital region   suspicious for a neoplasm.

## 2023-08-12 NOTE — PROGRESS NOTE ADULT - PROBLEM SELECTOR PLAN 3
- hx/o left leg DVT on coumadin   - On Coumadin 4mg x 5d & 2mg x2d   - INR 4.4 and possible surgery, will hold coumadin and other AC for now History of left leg DVT on Coumadin, unclear when diagnosed  Home regimen on Coumadin: 4 mg x 5 days weekly, 2 mg x 2 days weekly    Plan:  -F/u PCP regarding diagnosis of DVT  -DVT studies ordered, f/u results for persistence  -INR elevated, continue to trend  -In setting of elevated INR and possible procedural intervention, continue to hold Coumadin

## 2023-08-12 NOTE — PROGRESS NOTE ADULT - ASSESSMENT
89y F pmh Basal cell Ca left cheek s/p mohs's ~4yr ago, DVT left leg on Coumadin, htn, hld, [place], macular degeneration for c/o left facial mass c/f neoplasm c/b conjunctivitis. Left fungating mass, 7 cm x 2 cm x 7 cm,  growing from left forehead with swelling of left eye. First noticed by family 7 months ago. Maxilofacial CT shows orbits intact, no invasion of mass into the orbits at this time. Conjunctival cultures obtained for conjunctivitis. Given rapid rate of growth of tumor as well as necrotic features, growing mass concerning for neoplasm, SCC vs BCC.       Patient is a 89-year-old female with PMH of BCC of left cheek s/p Mohs 4 years ago, DVT left leg on Coumadin (unclear when diagnosed),HTN, HLD, [place] with hearing aids, and macular degeneration who presents with a left temporal fungating mass concerning for fungating SCC, that is approx. 7 cm x 2 cm x 7 cm, with associated periorbital edema. Mass was first noticed by family 7 months ago. Maxilofacial CT on 8/11 with no invasion of mass into the orbits at this time, but in setting of periorbital edema and conjunctival injection, conjunctival cultures obtained, and MRI ordered per ophthalmology recommendations in ED.

## 2023-08-12 NOTE — DISCHARGE NOTE PROVIDER - NSFOLLOWUPCLINICS_GEN_ALL_ED_FT
[facility]  Geriatrics  [address]  [place] [address]  Phone: [phone]  Fax:   Established Patient  Follow Up Time: 1 week

## 2023-08-12 NOTE — DISCHARGE NOTE PROVIDER - NSDCCPCAREPLAN_GEN_ALL_CORE_FT
PRINCIPAL DISCHARGE DIAGNOSIS  Diagnosis: Facial mass  Assessment and Plan of Treatment: You have a mass on your face that's suspicious for a malignancy. CT of the head show that mass is not invading local structures.  MRI of the mass was unable to be done after multiple attempts. After coordinating care with the palliative care team, internal medicine, general surgery, surgical oncology, and plastic surgery, we came to an understanding that taking off the mass would be able to be done during this admission due to risk of bleeding. Should you want to get the mass taken off, you would need first need to get a biopsy of the mass to see what kind of growth the surgeons be taking off before you can move onto surgical removal. If you want to pursue surgical removal, please follow up with surgical oncology outpatient.      SECONDARY DISCHARGE DIAGNOSES  Diagnosis: Left conjunctivitis  Assessment and Plan of Treatment: You have an eye infection of the left eye because the mass above your eye is draining pus into that eye. You will be sent home on 2 antibiotic drops to treat the infection as peventing infection of the eye will be difficult as the source of the infection is the mass. Please follow up with opthalmology outpatient.   If you develop sudden eye pain, high fevers, or unable to move your eye, please urgently go to the emergency room.     PRINCIPAL DISCHARGE DIAGNOSIS  Diagnosis: Facial mass  Assessment and Plan of Treatment: You have a mass on your face that's suspicious for a malignancy. CT of the head show that mass is not invading local structures.  MRI of the mass was unable to be done after multiple attempts. After coordinating care with the palliative care team, internal medicine, general surgery, surgical oncology, and plastic surgery, we came to an understanding that taking off the mass would be able to be done during this admission due to risk of bleeding. Should you want to get the mass taken off, you would need first need to get a biopsy of the mass to see what kind of growth the surgeons be taking off before you can move onto surgical removal. If you want to pursue surgical removal, please follow up with surgical oncology outpatient.  You have a history of a clot in your legs for which you were taking coumadin. Since you no longer have a clot in your leg you no longer need to take Coumadin.      SECONDARY DISCHARGE DIAGNOSES  Diagnosis: Left conjunctivitis  Assessment and Plan of Treatment: You have an eye infection of the left eye because the mass above your eye is draining pus into that eye. You will be sent home on 2 antibiotic drops to treat the infection as peventing infection of the eye will be difficult as the source of the infection is the mass. Please follow up with opthalmology outpatient.   If you develop sudden eye pain, high fevers, or unable to move your eye, please urgently go to the emergency room.

## 2023-08-12 NOTE — PROGRESS NOTE ADULT - SUBJECTIVE AND OBJECTIVE BOX
INTERVAL: Overnight, patient requested pain medication for bilateral hip pain (chronic) which was relieved with Tylenol 1 g IV.  SUBJECTIVE: Patient examined bedside this AM.    OBJECTIVE:  Vital Signs Last 24 Hrs  T(C): 36.9 (12 Aug 2023 06:55), Max: 36.9 (11 Aug 2023 22:45)  T(F): 98.5 (12 Aug 2023 06:55), Max: 98.5 (11 Aug 2023 22:45)  HR: 65 (12 Aug 2023 06:55) (65 - 75)  BP: 116/53 (12 Aug 2023 06:55) (95/61 - 116/53)  RR: 18 (12 Aug 2023 06:55) (18 - 19)  SpO2: 96% (12 Aug 2023 06:55) (96% - 100%)    O2 Parameters below as of 12 Aug 2023 06:55  Patient On (Oxygen Delivery Method): room air    08-11 @ 07:01  -  08-12 @ 06:59  --------------------------------------------------------  IN: 150 mL / OUT: 150 mL / NET: 0 mL    PHYSICAL EXAM:  General: NAD, laying in bed  HEENT: PERRLA, EOMI, sclera non-icteric  Neck: JVD absent  Respiratory: Clear to ascultation bilaterally, no crackles/rales, no accessory muscle use  Cardiovascular: RRR, no murmurs/rubs/gallops  Abdomen: Soft, NT, ND  Extremities: No LE edema  Skin: No rashes or lesions   Neurological: Sensation grossly intact, strength 5/5 in all extremities  Psychiatry: AOx3, appropriate insight/judgement, appropriate affect, recent/remote memory intact    LABS:                        11.2   10.48 )-----------( 382      ( 11 Aug 2023 06:28 )             35.3     Hgb Trend: 11.2<--, 11.3<--  08-11    139  |  104  |  18  ----------------------------<  157<H>  4.7   |  23  |  0.86    Ca    10.0      11 Aug 2023 06:28    TPro  6.5  /  Alb  3.4  /  TBili  0.1<L>  /  DBili  x   /  AST  18  /  ALT  17  /  AlkPhos  94  08-10    Creatinine Trend: 0.86<--, 0.80<--    PT/INR - ( 11 Aug 2023 06:28 )   PT: 29.8 sec;   INR: 2.93 ratio    PTT - ( 10 Aug 2023 15:59 )  PTT:44.5 sec    Urinalysis Basic - ( 11 Aug 2023 06:28 )  Color: x / Appearance: x / SG: x / pH: x  Gluc: 157 mg/dL / Ketone: x  / Bili: x / Urobili: x   Blood: x / Protein: x / Nitrite: x   Leuk Esterase: x / RBC: x / WBC x   Sq Epi: x / Non Sq Epi: x / Bacteria: x    MICROBIOLOGY:   Culture - Eye (collected 11 Aug 2023 02:22)  Source: .Eye Conjunctiva-Left  Gram Stain (11 Aug 2023 11:54):    Few polymorphonuclear leukocytes per low power field    Few Gram positive cocci in pairs per oil power field    Rare Gram Positive Rods per oil power field    Rare Gram Negative Rods per oil power field INTERVAL: Overnight, patient requested pain medication for bilateral hip pain (chronic) which was relieved with Tylenol 1 g IV.  SUBJECTIVE: Patient examined bedside this AM. Denies any symptoms other than discomfort from facial mass.    OBJECTIVE:  Vital Signs Last 24 Hrs  T(C): 36.9 (12 Aug 2023 06:55), Max: 36.9 (11 Aug 2023 22:45)  T(F): 98.5 (12 Aug 2023 06:55), Max: 98.5 (11 Aug 2023 22:45)  HR: 65 (12 Aug 2023 06:55) (65 - 75)  BP: 116/53 (12 Aug 2023 06:55) (95/61 - 116/53)  RR: 18 (12 Aug 2023 06:55) (18 - 19)  SpO2: 96% (12 Aug 2023 06:55) (96% - 100%)    O2 Parameters below as of 12 Aug 2023 06:55  Patient On (Oxygen Delivery Method): room air    08-11 @ 07:01  -  08-12 @ 06:59  --------------------------------------------------------  IN: 150 mL / OUT: 150 mL / NET: 0 mL    PHYSICAL EXAM:  General: NAD, laying in bed  HEENT: PERRLA, EOMI, sclera non-icteric  Neck: JVD absent  Respiratory: Clear to ascultation bilaterally, no crackles/rales, no accessory muscle use  Cardiovascular: RRR, no murmurs/rubs/gallops  Abdomen: Soft, NT, ND  Extremities: No LE edema  Skin: No rashes or lesions   Neurological: Sensation grossly intact, strength 5/5 in all extremities  Psychiatry: AOx3, appropriate insight/judgement, appropriate affect, recent/remote memory intact    LABS:                        11.2   10.48 )-----------( 382      ( 11 Aug 2023 06:28 )             35.3     Hgb Trend: 11.2<--, 11.3<--  08-11    139  |  104  |  18  ----------------------------<  157<H>  4.7   |  23  |  0.86    Ca    10.0      11 Aug 2023 06:28    TPro  6.5  /  Alb  3.4  /  TBili  0.1<L>  /  DBili  x   /  AST  18  /  ALT  17  /  AlkPhos  94  08-10    Creatinine Trend: 0.86<--, 0.80<--    PT/INR - ( 11 Aug 2023 06:28 )   PT: 29.8 sec;   INR: 2.93 ratio    PTT - ( 10 Aug 2023 15:59 )  PTT:44.5 sec    Urinalysis Basic - ( 11 Aug 2023 06:28 )  Color: x / Appearance: x / SG: x / pH: x  Gluc: 157 mg/dL / Ketone: x  / Bili: x / Urobili: x   Blood: x / Protein: x / Nitrite: x   Leuk Esterase: x / RBC: x / WBC x   Sq Epi: x / Non Sq Epi: x / Bacteria: x    MICROBIOLOGY:   Culture - Eye (collected 11 Aug 2023 02:22)  Source: .Eye Conjunctiva-Left  Gram Stain (11 Aug 2023 11:54):    Few polymorphonuclear leukocytes per low power field    Few Gram positive cocci in pairs per oil power field    Rare Gram Positive Rods per oil power field    Rare Gram Negative Rods per oil power field

## 2023-08-13 LAB
-  AMIKACIN: SIGNIFICANT CHANGE UP
-  AMOXICILLIN/CLAVULANIC ACID: SIGNIFICANT CHANGE UP
-  AMPICILLIN/SULBACTAM: SIGNIFICANT CHANGE UP
-  AMPICILLIN/SULBACTAM: SIGNIFICANT CHANGE UP
-  AMPICILLIN: SIGNIFICANT CHANGE UP
-  AZTREONAM: SIGNIFICANT CHANGE UP
-  CEFAZOLIN: SIGNIFICANT CHANGE UP
-  CEFAZOLIN: SIGNIFICANT CHANGE UP
-  CEFEPIME: SIGNIFICANT CHANGE UP
-  CEFOXITIN: SIGNIFICANT CHANGE UP
-  CEFTRIAXONE: SIGNIFICANT CHANGE UP
-  CIPROFLOXACIN: SIGNIFICANT CHANGE UP
-  CLINDAMYCIN: SIGNIFICANT CHANGE UP
-  ERTAPENEM: SIGNIFICANT CHANGE UP
-  ERYTHROMYCIN: SIGNIFICANT CHANGE UP
-  GENTAMICIN: SIGNIFICANT CHANGE UP
-  GENTAMICIN: SIGNIFICANT CHANGE UP
-  IMIPENEM: SIGNIFICANT CHANGE UP
-  LEVOFLOXACIN: SIGNIFICANT CHANGE UP
-  MEROPENEM: SIGNIFICANT CHANGE UP
-  OXACILLIN: SIGNIFICANT CHANGE UP
-  PENICILLIN: SIGNIFICANT CHANGE UP
-  PIPERACILLIN/TAZOBACTAM: SIGNIFICANT CHANGE UP
-  RIFAMPIN: SIGNIFICANT CHANGE UP
-  TETRACYCLINE: SIGNIFICANT CHANGE UP
-  TOBRAMYCIN: SIGNIFICANT CHANGE UP
-  TRIMETHOPRIM/SULFAMETHOXAZOLE: SIGNIFICANT CHANGE UP
-  TRIMETHOPRIM/SULFAMETHOXAZOLE: SIGNIFICANT CHANGE UP
-  VANCOMYCIN: SIGNIFICANT CHANGE UP
ANION GAP SERPL CALC-SCNC: 12 MMOL/L — SIGNIFICANT CHANGE UP (ref 5–17)
BASOPHILS # BLD AUTO: 0.06 K/UL — SIGNIFICANT CHANGE UP (ref 0–0.2)
BASOPHILS NFR BLD AUTO: 0.9 % — SIGNIFICANT CHANGE UP (ref 0–2)
BUN SERPL-MCNC: 21 MG/DL — SIGNIFICANT CHANGE UP (ref 7–23)
CALCIUM SERPL-MCNC: 9.8 MG/DL — SIGNIFICANT CHANGE UP (ref 8.4–10.5)
CHLORIDE SERPL-SCNC: 103 MMOL/L — SIGNIFICANT CHANGE UP (ref 96–108)
CO2 SERPL-SCNC: 23 MMOL/L — SIGNIFICANT CHANGE UP (ref 22–31)
CREAT SERPL-MCNC: 1.18 MG/DL — SIGNIFICANT CHANGE UP (ref 0.5–1.3)
EGFR: 41 ML/MIN/1.73M2 — LOW
EOSINOPHIL # BLD AUTO: 0.22 K/UL — SIGNIFICANT CHANGE UP (ref 0–0.5)
EOSINOPHIL NFR BLD AUTO: 3.4 % — SIGNIFICANT CHANGE UP (ref 0–6)
GLUCOSE SERPL-MCNC: 130 MG/DL — HIGH (ref 70–99)
HCT VFR BLD CALC: 34.4 % — LOW (ref 34.5–45)
HGB BLD-MCNC: 10.8 G/DL — LOW (ref 11.5–15.5)
IMM GRANULOCYTES NFR BLD AUTO: 0.8 % — SIGNIFICANT CHANGE UP (ref 0–0.9)
LYMPHOCYTES # BLD AUTO: 1.2 K/UL — SIGNIFICANT CHANGE UP (ref 1–3.3)
LYMPHOCYTES # BLD AUTO: 18.6 % — SIGNIFICANT CHANGE UP (ref 13–44)
MAGNESIUM SERPL-MCNC: 2.2 MG/DL — SIGNIFICANT CHANGE UP (ref 1.6–2.6)
MCHC RBC-ENTMCNC: 28.1 PG — SIGNIFICANT CHANGE UP (ref 27–34)
MCHC RBC-ENTMCNC: 31.4 GM/DL — LOW (ref 32–36)
MCV RBC AUTO: 89.4 FL — SIGNIFICANT CHANGE UP (ref 80–100)
METHOD TYPE: SIGNIFICANT CHANGE UP
METHOD TYPE: SIGNIFICANT CHANGE UP
MONOCYTES # BLD AUTO: 0.45 K/UL — SIGNIFICANT CHANGE UP (ref 0–0.9)
MONOCYTES NFR BLD AUTO: 7 % — SIGNIFICANT CHANGE UP (ref 2–14)
NEUTROPHILS # BLD AUTO: 4.47 K/UL — SIGNIFICANT CHANGE UP (ref 1.8–7.4)
NEUTROPHILS NFR BLD AUTO: 69.3 % — SIGNIFICANT CHANGE UP (ref 43–77)
NRBC # BLD: 0 /100 WBCS — SIGNIFICANT CHANGE UP (ref 0–0)
PHOSPHATE SERPL-MCNC: 3 MG/DL — SIGNIFICANT CHANGE UP (ref 2.5–4.5)
PLATELET # BLD AUTO: 392 K/UL — SIGNIFICANT CHANGE UP (ref 150–400)
POTASSIUM SERPL-MCNC: 4.3 MMOL/L — SIGNIFICANT CHANGE UP (ref 3.5–5.3)
POTASSIUM SERPL-SCNC: 4.3 MMOL/L — SIGNIFICANT CHANGE UP (ref 3.5–5.3)
RBC # BLD: 3.85 M/UL — SIGNIFICANT CHANGE UP (ref 3.8–5.2)
RBC # FLD: 14.1 % — SIGNIFICANT CHANGE UP (ref 10.3–14.5)
SODIUM SERPL-SCNC: 138 MMOL/L — SIGNIFICANT CHANGE UP (ref 135–145)
WBC # BLD: 6.45 K/UL — SIGNIFICANT CHANGE UP (ref 3.8–10.5)
WBC # FLD AUTO: 6.45 K/UL — SIGNIFICANT CHANGE UP (ref 3.8–10.5)

## 2023-08-13 PROCEDURE — 99232 SBSQ HOSP IP/OBS MODERATE 35: CPT | Mod: GC

## 2023-08-13 PROCEDURE — 93970 EXTREMITY STUDY: CPT | Mod: 26

## 2023-08-13 RX ADMIN — Medication 1 DROP(S): at 05:38

## 2023-08-13 RX ADMIN — Medication 650 MILLIGRAM(S): at 05:35

## 2023-08-13 RX ADMIN — Medication 1 TABLET(S): at 18:21

## 2023-08-13 RX ADMIN — Medication 1 TABLET(S): at 14:02

## 2023-08-13 RX ADMIN — Medication 1 DROP(S): at 14:03

## 2023-08-13 RX ADMIN — CHLORHEXIDINE GLUCONATE 1 APPLICATION(S): 213 SOLUTION TOPICAL at 14:03

## 2023-08-13 RX ADMIN — NYSTATIN CREAM 1 APPLICATION(S): 100000 CREAM TOPICAL at 05:38

## 2023-08-13 RX ADMIN — Medication 500 MILLIGRAM(S): at 14:02

## 2023-08-13 RX ADMIN — Medication 1 TABLET(S): at 05:38

## 2023-08-13 RX ADMIN — Medication 1000 UNIT(S): at 14:02

## 2023-08-13 RX ADMIN — Medication 1 DROP(S): at 18:21

## 2023-08-13 RX ADMIN — ATENOLOL 25 MILLIGRAM(S): 25 TABLET ORAL at 05:39

## 2023-08-13 RX ADMIN — SENNA PLUS 2 TABLET(S): 8.6 TABLET ORAL at 21:50

## 2023-08-13 RX ADMIN — Medication 650 MILLIGRAM(S): at 06:31

## 2023-08-13 RX ADMIN — ENOXAPARIN SODIUM 40 MILLIGRAM(S): 100 INJECTION SUBCUTANEOUS at 21:50

## 2023-08-13 RX ADMIN — Medication 1 DROP(S): at 18:22

## 2023-08-13 RX ADMIN — NYSTATIN CREAM 1 APPLICATION(S): 100000 CREAM TOPICAL at 18:21

## 2023-08-13 RX ADMIN — LATANOPROST 1 DROP(S): 0.05 SOLUTION/ DROPS OPHTHALMIC; TOPICAL at 21:50

## 2023-08-13 RX ADMIN — Medication 1 APPLICATION(S): at 21:50

## 2023-08-13 NOTE — PROGRESS NOTE ADULT - PROBLEM SELECTOR PLAN 3
History of left leg DVT on Coumadin, unclear when diagnosed  Home regimen on Coumadin: 4 mg x 5 days weekly, 2 mg x 2 days weekly    Plan:  -F/u PCP regarding diagnosis of DVT  -DVT studies ordered, f/u results for persistence  -INR elevated, continue to trend  -In setting of elevated INR and possible procedural intervention, continue to hold Coumadin

## 2023-08-13 NOTE — PROGRESS NOTE ADULT - PROBLEM SELECTOR PLAN 2
Left eye with copious purulent drainage, c/f bacterial conjunctivitis      Plan:  -F/u conjunctival culture  -Ophthalmology as above  -Ofloxacin QID OS  -Erythromycin ointment QHS OS  -Artificial tears QID OU  -Latanoprost OS for increased IOP

## 2023-08-13 NOTE — PROGRESS NOTE ADULT - SUBJECTIVE AND OBJECTIVE BOX
Luis Ontiveros  PGY-2 Resident Physician     Patient is a 89y old  Female who presents with a chief complaint of facial mass (12 Aug 2023 17:48)      SUBJECTIVE / OVERNIGHT EVENTS:  NAEON. Pt AO x 1. Unable to answer ROS based questions.     MEDICATIONS  (STANDING):  artificial tears (preservative free) Ophthalmic Solution 1 Drop(s) Both EYES four times a day  ascorbic acid 500 milliGRAM(s) Oral daily  atenolol  Tablet 25 milliGRAM(s) Oral daily  chlorhexidine 4% Liquid 1 Application(s) Topical daily  cholecalciferol 1000 Unit(s) Oral daily  enoxaparin Injectable 40 milliGRAM(s) SubCutaneous every 24 hours  erythromycin   Ointment 1 Application(s) Left EYE at bedtime  latanoprost 0.005% Ophthalmic Solution 1 Drop(s) Both EYES at bedtime  multivitamin 1 Tablet(s) Oral daily  nystatin Powder 1 Application(s) Topical two times a day  ofloxacin 0.3% Solution 1 Drop(s) Left EYE four times a day  senna 2 Tablet(s) Oral at bedtime  trimethoprim   80 mG/sulfamethoxazole 400 mG 1 Tablet(s) Oral two times a day    MEDICATIONS  (PRN):  acetaminophen     Tablet .. 650 milliGRAM(s) Oral every 6 hours PRN Temp greater or equal to 38C (100.4F), Mild Pain (1 - 3)  aluminum hydroxide/magnesium hydroxide/simethicone Suspension 30 milliLiter(s) Oral every 4 hours PRN Dyspepsia  melatonin 3 milliGRAM(s) Oral at bedtime PRN Insomnia  ondansetron Injectable 4 milliGRAM(s) IV Push every 8 hours PRN Nausea and/or Vomiting    Allergies    No Known Allergies    Intolerances        Vital Signs Last 24 Hrs  T(C): 36.7 (13 Aug 2023 04:47), Max: 37.1 (12 Aug 2023 12:50)  T(F): 98.1 (13 Aug 2023 04:47), Max: 98.7 (12 Aug 2023 12:50)  HR: 78 (13 Aug 2023 04:47) (66 - 78)  BP: 122/80 (13 Aug 2023 04:47) (122/80 - 135/67)  BP(mean): --  RR: 18 (13 Aug 2023 04:47) (18 - 18)  SpO2: 96% (13 Aug 2023 04:47) (96% - 99%)    Parameters below as of 13 Aug 2023 04:47  Patient On (Oxygen Delivery Method): room air    Daily     Daily   I&O's Summary    Physical Exam  General: NAD, laying in bed  HEENT: PERRLA, EOMI, sclera non-icteric  Neck: JVD absent  Respiratory: Clear to ascultation bilaterally, no crackles/rales, no accessory muscle use  Cardiovascular: RRR, no murmurs/rubs/gallops  Abdomen: Soft, NT, ND  Extremities: No LE edema  Skin: No rashes or lesions   Neurological: Sensation grossly intact, strength 5/5 in all extremities  Psychiatry: AOx3, appropriate insight/judgement, appropriate affect, recent/remote memory intact    DIAGNOSTICS:                         10.8   6.45  )-----------( 392      ( 13 Aug 2023 07:21 )             34.4     Hgb Trend: 10.8<--, 10.3<--, 11.2<--, 11.3<--  08-13    138  |  103  |  21  ----------------------------<  130<H>  4.3   |  23  |  1.18    Ca    9.8      13 Aug 2023 07:22  Phos  3.0     08-13  Mg     2.2     08-13      CAPILLARY BLOOD GLUCOSE        Creatinine Trend: 1.18<--, 1.04<--, 0.86<--, 0.80<--    PT/INR - ( 12 Aug 2023 07:30 )   PT: 27.9 sec;   INR: 2.61 ratio               Urinalysis Basic - ( 13 Aug 2023 07:22 )    Color: x / Appearance: x / SG: x / pH: x  Gluc: 130 mg/dL / Ketone: x  / Bili: x / Urobili: x   Blood: x / Protein: x / Nitrite: x   Leuk Esterase: x / RBC: x / WBC x   Sq Epi: x / Non Sq Epi: x / Bacteria: x

## 2023-08-13 NOTE — PROGRESS NOTE ADULT - PROBLEM SELECTOR PLAN 1
CT Head and Maxillofacial 8/11/23: 7.5 x 2.4 x 7.0 cm dermal mass in the left forehead and periorbital region suspicious for a neoplasm    Plan:  -F/u MRI  -Ophthalmology on board, appreciate recommendations  -Surgical oncology on board, appreciate recommendations  -Wound care consult, appreciate recommendations  -Goals of care discussion initiated with daughter on 8/11 by primary team given patient's age to discuss risk versus benefit of any possible intervention

## 2023-08-13 NOTE — PROGRESS NOTE ADULT - ASSESSMENT
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 7 cm x 2 cm x 7 cm, with associated periorbital edema. Mass was first noticed by family 7 months ago. Maxilofacial CT on 8/11 with no invasion of mass into the orbits at this time, but in setting of periorbital edema and conjunctival injection, conjunctival cultures obtained, and MRI ordered per ophthalmology recommendations in ED.

## 2023-08-14 LAB
ALBUMIN SERPL ELPH-MCNC: 2.9 G/DL — LOW (ref 3.3–5)
ALP SERPL-CCNC: 79 U/L — SIGNIFICANT CHANGE UP (ref 40–120)
ALT FLD-CCNC: 17 U/L — SIGNIFICANT CHANGE UP (ref 10–45)
ANION GAP SERPL CALC-SCNC: 12 MMOL/L — SIGNIFICANT CHANGE UP (ref 5–17)
AST SERPL-CCNC: 21 U/L — SIGNIFICANT CHANGE UP (ref 10–40)
BASOPHILS # BLD AUTO: 0.04 K/UL — SIGNIFICANT CHANGE UP (ref 0–0.2)
BASOPHILS NFR BLD AUTO: 0.6 % — SIGNIFICANT CHANGE UP (ref 0–2)
BILIRUB SERPL-MCNC: 0.2 MG/DL — SIGNIFICANT CHANGE UP (ref 0.2–1.2)
BUN SERPL-MCNC: 18 MG/DL — SIGNIFICANT CHANGE UP (ref 7–23)
CALCIUM SERPL-MCNC: 9.6 MG/DL — SIGNIFICANT CHANGE UP (ref 8.4–10.5)
CHLORIDE SERPL-SCNC: 103 MMOL/L — SIGNIFICANT CHANGE UP (ref 96–108)
CO2 SERPL-SCNC: 23 MMOL/L — SIGNIFICANT CHANGE UP (ref 22–31)
CREAT SERPL-MCNC: 1.19 MG/DL — SIGNIFICANT CHANGE UP (ref 0.5–1.3)
EGFR: 41 ML/MIN/1.73M2 — LOW
EOSINOPHIL # BLD AUTO: 0.25 K/UL — SIGNIFICANT CHANGE UP (ref 0–0.5)
EOSINOPHIL NFR BLD AUTO: 3.9 % — SIGNIFICANT CHANGE UP (ref 0–6)
GLUCOSE SERPL-MCNC: 106 MG/DL — HIGH (ref 70–99)
HCT VFR BLD CALC: 33.8 % — LOW (ref 34.5–45)
HGB BLD-MCNC: 10.6 G/DL — LOW (ref 11.5–15.5)
IMM GRANULOCYTES NFR BLD AUTO: 0.8 % — SIGNIFICANT CHANGE UP (ref 0–0.9)
LYMPHOCYTES # BLD AUTO: 1.26 K/UL — SIGNIFICANT CHANGE UP (ref 1–3.3)
LYMPHOCYTES # BLD AUTO: 19.8 % — SIGNIFICANT CHANGE UP (ref 13–44)
MAGNESIUM SERPL-MCNC: 2.2 MG/DL — SIGNIFICANT CHANGE UP (ref 1.6–2.6)
MCHC RBC-ENTMCNC: 27.9 PG — SIGNIFICANT CHANGE UP (ref 27–34)
MCHC RBC-ENTMCNC: 31.4 GM/DL — LOW (ref 32–36)
MCV RBC AUTO: 88.9 FL — SIGNIFICANT CHANGE UP (ref 80–100)
MONOCYTES # BLD AUTO: 0.61 K/UL — SIGNIFICANT CHANGE UP (ref 0–0.9)
MONOCYTES NFR BLD AUTO: 9.6 % — SIGNIFICANT CHANGE UP (ref 2–14)
NEUTROPHILS # BLD AUTO: 4.16 K/UL — SIGNIFICANT CHANGE UP (ref 1.8–7.4)
NEUTROPHILS NFR BLD AUTO: 65.3 % — SIGNIFICANT CHANGE UP (ref 43–77)
NRBC # BLD: 0 /100 WBCS — SIGNIFICANT CHANGE UP (ref 0–0)
PHOSPHATE SERPL-MCNC: 2.5 MG/DL — SIGNIFICANT CHANGE UP (ref 2.5–4.5)
PLATELET # BLD AUTO: 373 K/UL — SIGNIFICANT CHANGE UP (ref 150–400)
POTASSIUM SERPL-MCNC: 4.5 MMOL/L — SIGNIFICANT CHANGE UP (ref 3.5–5.3)
POTASSIUM SERPL-SCNC: 4.5 MMOL/L — SIGNIFICANT CHANGE UP (ref 3.5–5.3)
PROT SERPL-MCNC: 5.8 G/DL — LOW (ref 6–8.3)
RBC # BLD: 3.8 M/UL — SIGNIFICANT CHANGE UP (ref 3.8–5.2)
RBC # FLD: 14.2 % — SIGNIFICANT CHANGE UP (ref 10.3–14.5)
SODIUM SERPL-SCNC: 138 MMOL/L — SIGNIFICANT CHANGE UP (ref 135–145)
WBC # BLD: 6.37 K/UL — SIGNIFICANT CHANGE UP (ref 3.8–10.5)
WBC # FLD AUTO: 6.37 K/UL — SIGNIFICANT CHANGE UP (ref 3.8–10.5)

## 2023-08-14 PROCEDURE — 99233 SBSQ HOSP IP/OBS HIGH 50: CPT

## 2023-08-14 PROCEDURE — 99233 SBSQ HOSP IP/OBS HIGH 50: CPT | Mod: GC

## 2023-08-14 PROCEDURE — 99223 1ST HOSP IP/OBS HIGH 75: CPT

## 2023-08-14 PROCEDURE — 99222 1ST HOSP IP/OBS MODERATE 55: CPT | Mod: GC

## 2023-08-14 RX ADMIN — Medication 1 TABLET(S): at 05:20

## 2023-08-14 RX ADMIN — Medication 1 DROP(S): at 05:20

## 2023-08-14 RX ADMIN — Medication 1 DROP(S): at 00:27

## 2023-08-14 RX ADMIN — Medication 1 DROP(S): at 12:04

## 2023-08-14 RX ADMIN — Medication 1 DROP(S): at 00:28

## 2023-08-14 RX ADMIN — Medication 1 DROP(S): at 23:28

## 2023-08-14 RX ADMIN — ATENOLOL 25 MILLIGRAM(S): 25 TABLET ORAL at 05:20

## 2023-08-14 RX ADMIN — NYSTATIN CREAM 1 APPLICATION(S): 100000 CREAM TOPICAL at 17:42

## 2023-08-14 RX ADMIN — Medication 650 MILLIGRAM(S): at 07:46

## 2023-08-14 RX ADMIN — ENOXAPARIN SODIUM 40 MILLIGRAM(S): 100 INJECTION SUBCUTANEOUS at 21:45

## 2023-08-14 RX ADMIN — Medication 1 DROP(S): at 05:21

## 2023-08-14 RX ADMIN — Medication 1 APPLICATION(S): at 21:45

## 2023-08-14 RX ADMIN — NYSTATIN CREAM 1 APPLICATION(S): 100000 CREAM TOPICAL at 05:20

## 2023-08-14 RX ADMIN — CHLORHEXIDINE GLUCONATE 1 APPLICATION(S): 213 SOLUTION TOPICAL at 12:05

## 2023-08-14 RX ADMIN — Medication 1 TABLET(S): at 17:42

## 2023-08-14 RX ADMIN — Medication 1 DROP(S): at 17:43

## 2023-08-14 RX ADMIN — Medication 1 DROP(S): at 12:03

## 2023-08-14 RX ADMIN — Medication 1 DROP(S): at 17:41

## 2023-08-14 RX ADMIN — Medication 500 MILLIGRAM(S): at 12:04

## 2023-08-14 RX ADMIN — LATANOPROST 1 DROP(S): 0.05 SOLUTION/ DROPS OPHTHALMIC; TOPICAL at 21:45

## 2023-08-14 RX ADMIN — Medication 650 MILLIGRAM(S): at 08:16

## 2023-08-14 RX ADMIN — Medication 1 TABLET(S): at 12:04

## 2023-08-14 RX ADMIN — Medication 1000 UNIT(S): at 12:04

## 2023-08-14 RX ADMIN — SENNA PLUS 2 TABLET(S): 8.6 TABLET ORAL at 21:45

## 2023-08-14 NOTE — PROGRESS NOTE ADULT - SUBJECTIVE AND OBJECTIVE BOX
[facility] DEPARTMENT OF OPHTHALMOLOGY  ------------------------------------------------------------------------------  Abdulaziz Pérez MD, PGY3  Also available on [employer] Teams  ------------------------------------------------------------------------------    Interval History: No acute events overnight.     MEDICATIONS  (STANDING):  artificial tears (preservative free) Ophthalmic Solution 1 Drop(s) Both EYES four times a day  ascorbic acid 500 milliGRAM(s) Oral daily  atenolol  Tablet 25 milliGRAM(s) Oral daily  chlorhexidine 4% Liquid 1 Application(s) Topical daily  cholecalciferol 1000 Unit(s) Oral daily  enoxaparin Injectable 40 milliGRAM(s) SubCutaneous every 24 hours  erythromycin   Ointment 1 Application(s) Left EYE at bedtime  latanoprost 0.005% Ophthalmic Solution 1 Drop(s) Both EYES at bedtime  multivitamin 1 Tablet(s) Oral daily  nystatin Powder 1 Application(s) Topical two times a day  ofloxacin 0.3% Solution 1 Drop(s) Left EYE four times a day  senna 2 Tablet(s) Oral at bedtime  trimethoprim   80 mG/sulfamethoxazole 400 mG 1 Tablet(s) Oral two times a day    MEDICATIONS  (PRN):  acetaminophen     Tablet .. 650 milliGRAM(s) Oral every 6 hours PRN Temp greater or equal to 38C (100.4F), Mild Pain (1 - 3)  aluminum hydroxide/magnesium hydroxide/simethicone Suspension 30 milliLiter(s) Oral every 4 hours PRN Dyspepsia  melatonin 3 milliGRAM(s) Oral at bedtime PRN Insomnia  ondansetron Injectable 4 milliGRAM(s) IV Push every 8 hours PRN Nausea and/or Vomiting      VITALS: T(C): 36.5 (08-14-23 @ 04:20)  T(F): 97.7 (08-14-23 @ 04:20), Max: 98.8 (08-13-23 @ 20:41)  HR: 67 (08-14-23 @ 04:20) (61 - 67)  BP: 127/60 (08-14-23 @ 04:20) (127/60 - 135/61)  RR:  (18 - 18)  SpO2:  (95% - 98%)  Wt(kg): --  AAOx3, hard of hearing    Ophthalmology Exam:  Visual acuity (sc): 20/40 OD, 20/200 OS (VA assessment limited 2/2 inability to properly use pinhole or place glasses iso large mass)  Pupils: ? 1+-2+APD OS   [place]: 11 OD, 18 OS  Extraocular movements (EOMs): Possible -1 restriction of abduction, although assessment limited 2/2 patient's ability to cooperate    Confrontational Visual Field (CVF): Full OD. Superotemporal defect OS (Limited 2/2 patient's ability to cooperate)  Color Plates: 10/12 OD, unable OS    Pen Light Exam (PLE)  External: Large, necrotic, friable mass on left forehead with associated episodes of bleeding with manipulation of surrounding skin   Lids/Lashes/Lacrimal Ducts: Flat OD. Large mucopurulent discharge OS. Mechanical ptosis OS.   Sclera/Conjunctiva: W+Q OD. trace-1+ injection OS.   Cornea: Diffuse corneal irregularity OU (OS>OD). +1-2 SPK OU. Possible bulbar and palpebral conj epi defect OS, although assessment limited 2/2 patient's ability to cooperate   Anterior Chamber: D+F OU    Iris: Flat OU  Lens: PCIOL OU    Fundus Exam: dilated with 1% tropicamide and 2.5% phenylephrine  Approval obtained from primary team for dilation  Patient aware that pupils can remained dilated for at least 4-6 hours  Exam performed with 20D lens    Vitreous: wnl OU  Disc, cup/disc: CDR 0.6 OD, 0.8 OS   Macula: wnl OU  Vessels: wnl OU  Periphery: assessment limited 2/2 patient's limited ROM and ability to cooperate. Grossly flat and attached.     < from: CT Maxillofacial w/ IV Cont (08.10.23 @ 20:10) >    The brain demonstrates moderate periventricular white matter ischemia.    No acute cerebral cortical infarct is seen.  No intracranial hemorrhage   is found.    The ventricles, sulci and basal cisterns appear unremarkable.    The vertebral and internal carotid arteries demonstrate expected   enhancement indicating their patency.    The orbits are unremarkable.  The paranasal sinuses are clear.  The nasal   cavity remains intact.  The nasopharynx is symmetric.  The central skull   base, petrous temporal bones and calvarium remain intact.    Images demonstrate the presence of a lobulated 7.5 x 2.4 x 7.0 cm dermal   mass in the LEFT forehead and periorbital region suspicious for a   neoplasm. The underlying calvarium appears intact.      IMPRESSION:  Moderate periventricular white matter ischemia.    7.5 x 2.4   x 7.0 cm dermal mass in the LEFT forehead and periorbital region   suspicious for a neoplasm.    < end of copied text >

## 2023-08-14 NOTE — PROGRESS NOTE ADULT - ASSESSMENT
Assessment and Recommendations:  89y female w/ POHx CE PCIOL, glaucoma, and ARMD presenting with large,  necrotic left forehead mass, suspicious for neoplasm, as well as injection and copious mucopurulent discharge OS, concerning for bacterial conjunctivitis.    #L-Forehead Mass w/ Concern for Neoplasm   -Patient noted to have large, necrotic, and friable left forehead mass with associated mechanical ptosis OS. Mass noted to periodically bleed during assessment iso manipulation of healthy, surrounding tissue.   -Per patient's daughter at bedside, mass developed approximately 6 months ago and has been growing rapidly   -Overall, appearance of mass concerning for malignancy, particularly iso patient's hx of basal cell carcinoma (although mass w/ squamous CA appearance)  -CT maxillofacial with IV contrast showing no orbital involvement  -Recommend wound care consult   -Recommend head & neck surgery consult  - biopsy to be determined based on GOC  -Recommend MRI face & orbits with IV contrast - patient was unable to tolerate    #Bacterial Conjunctivitis OS  #Dry eye OU  -Patient noted to have copious mucopurulent discharge OS  -improved injection today  -Appearance of findings concerning for bacterial conjunctivitis.  -continue ofloxacin QID OS  -continue erythromycin ointment at bedtime OS  -eye culture growing few providencia, +staph aureus  -continue artificial tears QID OU    #Glaucoma, likely POAG OU  -Noted to have increased CDR OU (0.6 OD, 0.8 OS) with +APD OS  -IOP wnl today   -Please clarify patient's IOP lowering drop; Patient's daughter to bring tomorrow     To be seen with Dr. Barrow, attending.    Outpatient follow-up: Patient should follow-up with his/her ophthalmologist or with [facility] Department of Ophthalmology at the address below     [address] [place] [address]. [address]  [place] [address]  [phone]   Assessment and Recommendations:  89y female w/ POHx CE PCIOL, glaucoma, and ARMD presenting with large,  necrotic left forehead mass, suspicious for neoplasm, as well as injection and copious mucopurulent discharge OS, concerning for bacterial conjunctivitis.    #L-Forehead Mass w/ Concern for Neoplasm   -Patient noted to have large, necrotic, and friable left forehead mass with associated mechanical ptosis OS. Mass noted to periodically bleed during assessment iso manipulation of healthy, surrounding tissue.   -Per patient's daughter at bedside, mass developed approximately 6 months ago and has been growing rapidly   -Overall, appearance of mass concerning for malignancy, particularly iso patient's hx of basal cell carcinoma (although mass w/ squamous CA appearance)  -CT maxillofacial with IV contrast showing no orbital involvement  -Recommend wound care consult   -Recommend head & neck surgery consult  - biopsy to be determined based on GOC  -Recommend MRI face & orbits with IV contrast - patient was unable to tolerate  -should follow up outpatient     #Bacterial Conjunctivitis OS  #Dry eye OU  -Patient noted to have copious mucopurulent discharge OS  -improved injection today  -Appearance of findings concerning for bacterial conjunctivitis.  -continue ofloxacin QID OS  -continue erythromycin ointment at bedtime OS  -eye culture growing few providencia, +staph aureus- covered with abx and ointments continue  -continue artificial tears QID OU  -please reach out with any eye pain, worsening redness, increased amount of discharge, per GOC    #Glaucoma, likely POAG OU  -Noted to have increased CDR OU (0.6 OD, 0.8 OS) with +APD OS  -IOP wnl today   -Please clarify patient's IOP lowering drop; Patient's daughter to bring tomorrow     Seen and discussed with Dr. Short, attending.    Outpatient follow-up: Patient should follow-up with his/her ophthalmologist or with [facility] Department of Ophthalmology at the address below     [address] [place] [address]. [address]  [place] [address]  [phone]

## 2023-08-14 NOTE — PROGRESS NOTE ADULT - PROBLEM SELECTOR PLAN 2
Left eye with copious purulent drainage, c/f bacterial conjunctivitis      Plan:  -F/u conjunctival culture  -Ophthalmology as above  -Ofloxacin QID OS  -Erythromycin ointment QHS OS  -Artificial tears QID OU  -Latanoprost OS for increased IOP Left eye with copious purulent drainage, c/f bacterial conjunctivitis      Plan:  -conjunctival culture: Few Providencia rettgeri, Few Staphylococcus aureus, Moderate Corynebacterium amycolatum   -optho following:             -Ofloxacin QID OS             -Erythromycin ointment QHS OS             -Artificial tears QID OU  -Latanoprost OS for increased IOP  -ID c/s -> no change in medications

## 2023-08-14 NOTE — CONSULT NOTE ADULT - PROBLEM SELECTOR RECOMMENDATION 9
See GOC above.   Wound care.   Patient denied pain; however, if pain appears, may consider Morphine 3-6mg PO q 4hrs PRN moderate to severe pain.   If prescribing Morphine, consider bowel regimen.

## 2023-08-14 NOTE — PROGRESS NOTE ADULT - PROBLEM SELECTOR PLAN 3
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

## 2023-08-14 NOTE — CONSULT NOTE ADULT - SUBJECTIVE AND OBJECTIVE BOX
Date of Service:08-14-23 @ 15:15  HPI:   89y F pmh Basal cell Ca left cheek s/p mohs's ~4yr ago, DVT left leg on Coumadin, htn, hld, [place], macular degeneration p/w daughter/ HCP Gabi at bedside who pt lives w/ for c/o left facial mass. Pt has growing facial mass on left forehead xmonths. Have been in contact with doctor (Abdulaziz Pérez PCP) regarding mass, who have been doing watchful waiting of mass. Mass has now grown to point that it is pushing on eye, and recently in past few days hs been associated w/ pustular drainage. At home daughter has been cleaning mass w/ peroxide and applying OTC abx ointment. Pt now experiencing increased  pain/discomfort ( moderately relieved w/Tyelenol), vision obstruction, which prompted pt and family to come to ED for eval. Pt has had limited mobility since getting COVID 6 months ago, so daughter unable to take pt to appointment for eval.      ROS: Denies CP, SOB, palpitation, N/V/D, fever, cough, chills, dizziness, abm pain, recent travel, sick contact, change in bowel and urinary habits     A 10-system ROS was performed and is negative except as noted above and/or in the HPI.     (10 Aug 2023 22:38)    PERTINENT PM/SXH:   HTN (hypertension)    DVT (deep venous thrombosis)    Macular degeneration    HLD (hyperlipidemia)    Breast CA    Basal cell carcinoma      S/P lumpectomy of breast    S/P cataract extraction    S/P tonsillectomy    S/P colon resection      FAMILY HISTORY:  FH: breast cancer (Sibling)      Family Hx substance abuse [ ]yes [ ]no  ITEMS NOT CHECKED ARE NOT PRESENT    SOCIAL HISTORY:   Significant other/partner[ ]  Children[ ]  Orthodox/Spirituality:  Substance hx:  [ ]   Tobacco hx:  [ ]   Alcohol hx: [ ]   Home Opioid hx:  [ ] I-Stop Reference No:  Living Situation: [ ]Home  [ ]Long term care  [ ]Rehab [ ]Other    ADVANCE DIRECTIVES:    DNR/MOLST  [ ]  Living Will  [ ]   DECISION MAKER(s):  [ ] Health Care Proxy(s)  [ ] Surrogate(s)  [ ] Guardian           Name(s): Phone Number(s):    BASELINE (I)ADL(s) (prior to admission):  [place]: [ ]Total  [ ] Moderate [ ]Dependent    Allergies    No Known Allergies    Intolerances    MEDICATIONS  (STANDING):  artificial tears (preservative free) Ophthalmic Solution 1 Drop(s) Both EYES four times a day  ascorbic acid 500 milliGRAM(s) Oral daily  atenolol  Tablet 25 milliGRAM(s) Oral daily  chlorhexidine 4% Liquid 1 Application(s) Topical daily  cholecalciferol 1000 Unit(s) Oral daily  enoxaparin Injectable 40 milliGRAM(s) SubCutaneous every 24 hours  erythromycin   Ointment 1 Application(s) Left EYE at bedtime  latanoprost 0.005% Ophthalmic Solution 1 Drop(s) Both EYES at bedtime  multivitamin 1 Tablet(s) Oral daily  nystatin Powder 1 Application(s) Topical two times a day  ofloxacin 0.3% Solution 1 Drop(s) Left EYE four times a day  senna 2 Tablet(s) Oral at bedtime  trimethoprim   80 mG/sulfamethoxazole 400 mG 1 Tablet(s) Oral two times a day    MEDICATIONS  (PRN):  acetaminophen     Tablet .. 650 milliGRAM(s) Oral every 6 hours PRN Temp greater or equal to 38C (100.4F), Mild Pain (1 - 3)  aluminum hydroxide/magnesium hydroxide/simethicone Suspension 30 milliLiter(s) Oral every 4 hours PRN Dyspepsia  melatonin 3 milliGRAM(s) Oral at bedtime PRN Insomnia  ondansetron Injectable 4 milliGRAM(s) IV Push every 8 hours PRN Nausea and/or Vomiting    PRESENT SYMPTOMS: [ ]Unable to self-report  [ ] CPOT [ ] PAINADs [ ] RDOS  Source if other than patient:  [ ]Family   [ ]Team     Pain: [ ]yes [ ]no  QOL impact -   Location -                    Aggravating factors -  Quality -  Radiation -  Timing-  Severity (0-10 scale):  Minimal acceptable level (0-10 scale):     CPOT:    https://www.[facility].org/getattachment/[email or web address]/Critical-Care-Pain-Observation-Tool-(CPOT)    PAINAD Score: See PAINAD tool and score below     Dyspnea:                           [ ]Mild [ ]Moderate [ ]Severe    RDOS: See RDOS tool and score below   0 to 2  minimal or no respiratory distress   3  mild distress  4 to 6 moderate distress  >7 severe distress      Anxiety:                             [ ]Mild [ ]Moderate [ ]Severe  Fatigue:                             [ ]Mild [ ]Moderate [ ]Severe  Nausea:                             [ ]Mild [ ]Moderate [ ]Severe  Loss of appetite:              [ ]Mild [ ]Moderate [ ]Severe  Constipation:                    [ ]Mild [ ]Moderate [ ]Severe    PCSSQ[Palliative Care Spiritual Screening Question]   Severity (0-10):  Score of 4 or > indicate consideration of Chaplaincy referral.  Chaplaincy Referral: [ ] yes [ ] refused [ ] following [ ] Deferred     Caregiver [place]? : [ ] yes [ ] no [ ] Deferred [ ] Declined             Social work referral [ ] Patient & Family Centered Care Referral [ ]     Anticipatory Grief present?:  [ ] yes [ ] no  [ ] Deferred                  Social work referral [ ] Chaplaincy Referral [ ]    		  Other Symptoms:  [ ]All other review of systems negative     Palliative Performance Status Version 2:   See PPSv2 tool and score below          PHYSICAL EXAM:  Vital Signs Last 24 Hrs  T(C): 36.5 (14 Aug 2023 13:00), Max: 37.1 (13 Aug 2023 20:41)  T(F): 97.7 (14 Aug 2023 13:00), Max: 98.8 (13 Aug 2023 20:41)  HR: 65 (14 Aug 2023 13:00) (61 - 67)  BP: 112/57 (14 Aug 2023 13:00) (112/57 - 135/61)  BP(mean): --  RR: 18 (14 Aug 2023 13:00) (18 - 18)  SpO2: 97% (14 Aug 2023 13:00) (95% - 98%)    Parameters below as of 14 Aug 2023 13:00  Patient On (Oxygen Delivery Method): room air     I&O's Summary    13 Aug 2023 07:01  -  14 Aug 2023 07:00  --------------------------------------------------------  IN: 0 mL / OUT: 300 mL / NET: -300 mL      GENERAL: [ ]Cachexia    [ ]Alert  [ ]Oriented x   [ ]Lethargic  [ ]Unarousable  [ ]Verbal  [ ]Non-Verbal  Behavioral:   [ ] Anxiety  [ ] Delirium [ ] Agitation [ ] Other  HEENT:  [ ]Normal   [ ]Dry mouth   [ ]ET Tube/Trach  [ ]Oral lesions  PULMONARY:   [ ]Clear [ ]Tachypnea  [ ]Audible excessive secretions   [ ]Rhonchi        [ ]Right [ ]Left [ ]Bilateral  [ ]Crackles        [ ]Right [ ]Left [ ]Bilateral  [ ]Wheezing     [ ]Right [ ]Left [ ]Bilateral  [ ]Diminished breath sounds [ ]right [ ]left [ ]bilateral  CARDIOVASCULAR:    [ ]Regular [ ]Irregular [ ]Tachy  [ ]Albaro [ ]Murmur [ ]Other  GASTROINTESTINAL:  [ ]Soft  [ ]Distended   [ ]+BS  [ ]Non tender [ ]Tender  [ ]Other [ ]PEG [ ]OGT/ NGT  Last BM:  GENITOURINARY:  [ ]Normal [ ] Incontinent   [ ]Oliguria/Anuria   [ ]Drew  MUSCULOSKELETAL:   [ ]Normal   [ ]Weakness  [ ]Bed/Wheelchair bound [ ]Edema  NEUROLOGIC:   [ ]No focal deficits  [ ]Cognitive impairment  [ ]Dysphagia [ ]Dysarthria [ ]Paresis [ ]Other   SKIN:   [ ]Normal  [ ]Rash  [ ]Other  [ ]Pressure ulcer(s)       Present on admission [ ]y [ ]n    CRITICAL CARE:  [ ] Shock Present  [ ]Septic [ ]Cardiogenic [ ]Neurologic [ ]Hypovolemic  [ ]  Vasopressors [ ]  Inotropes   [ ]Respiratory failure present [ ]Mechanical ventilation [ ]Non-invasive ventilatory support [ ]High flow    [ ]Acute  [ ]Chronic [ ]Hypoxic  [ ]Hypercarbic [ ]Other  [ ]Other organ failure     LABS:                        10.6   6.37  )-----------( 373      ( 14 Aug 2023 07:25 )             33.8   08-14    138  |  103  |  18  ----------------------------<  106<H>  4.5   |  23  |  1.19    Ca    9.6      14 Aug 2023 07:21  Phos  2.5     08-14  Mg     2.2     08-14    TPro  5.8<L>  /  Alb  2.9<L>  /  TBili  0.2  /  DBili  x   /  AST  21  /  ALT  17  /  AlkPhos  79  08-14      Urinalysis Basic - ( 14 Aug 2023 07:21 )    Color: x / Appearance: x / SG: x / pH: x  Gluc: 106 mg/dL / Ketone: x  / Bili: x / Urobili: x   Blood: x / Protein: x / Nitrite: x   Leuk Esterase: x / RBC: x / WBC x   Sq Epi: x / Non Sq Epi: x / Bacteria: x      RADIOLOGY & ADDITIONAL STUDIES:    PROTEIN CALORIE MALNUTRITION PRESENT: [ ]mild [ ]moderate [ ]severe [ ]underweight [ ]morbid obesity  https://www.andeal.org/vault/2440/web/files/ONC/Table_Clinical%20Characteristics%20to%20Document%20Malnutrition-White%20JV%20et%20al%202012.pdf    Height (cm): 160 (08-11-23 @ 00:37)  Weight (kg): 75.7 (08-11-23 @ 00:37)  BMI (kg/m2): 29.6 (08-11-23 @ 00:37)    [ ]PPSV2 < or = to 30% [ ]significant weight loss  [ ]poor nutritional intake  [ ]anasarca[ ]Artificial Nutrition      Other REFERRALS:  [ ]Hospice  [ ]Child Life  [ ]Social Work  [ ]Case management [ ]Holistic Therapy     Goals of Care Document:  Date of Service:08-14-23 @ 15:15  HPI:   89y F pmh Basal cell Ca left cheek s/p mohs's ~4yr ago, DVT left leg on Coumadin, htn, hld, [place], macular degeneration p/w daughter/ HCP Gabi at bedside who pt lives w/ for c/o left facial mass. Pt has growing facial mass on left forehead xmonths. Have been in contact with doctor (Abdulaziz Pérez PCP) regarding mass, who have been doing watchful waiting of mass. Mass has now grown to point that it is pushing on eye, and recently in past few days hs been associated w/ pustular drainage. At home daughter has been cleaning mass w/ peroxide and applying OTC abx ointment. Pt now experiencing increased  pain/discomfort ( moderately relieved w/Tyelenol), vision obstruction, which prompted pt and family to come to ED for eval. Pt has had limited mobility since getting COVID 6 months ago, so daughter unable to take pt to appointment for eval.      ROS: Denies CP, SOB, palpitation, N/V/D, fever, cough, chills, dizziness, abm pain, recent travel, sick contact, change in bowel and urinary habits     A 10-system ROS was performed and is negative except as noted above and/or in the HPI.     (10 Aug 2023 22:38)    8/14 CT Head and Maxillofacial 8/11/23: 7.5 x 2.4 x 7.0 cm dermal mass in the left forehead and periorbital region suspicious for a neoplasm. As per Sx "- If surgery aligns with the patient's goal of care may benefit from a biopsy."As per ID, grew MSSA and Providencia Rettgeri."    Based on GOC note dated 8/11, "GOC discussed with family members; stated that at this point, pt. would like to be full code. They would like to entertain options for superficial surgical excision of the mass that do not involve general anesthesia."     Geriatrics and Palliative Medicine (GaP) Team was called for goals of care and advanced care planning.     PERTINENT PM/SXH:   HTN (hypertension)    DVT (deep venous thrombosis)    Macular degeneration    HLD (hyperlipidemia)    Breast CA    Basal cell carcinoma      S/P lumpectomy of breast    S/P cataract extraction    S/P tonsillectomy    S/P colon resection      FAMILY HISTORY:  FH: breast cancer (Sibling)      Family Hx substance abuse [ ]yes [ ]no  ITEMS NOT CHECKED ARE NOT PRESENT    SOCIAL HISTORY:   Significant other/partner[ ]  Children[ ]  Sabianism/Spirituality:  Substance hx:  [ ]   Tobacco hx:  [ ]   Alcohol hx: [ ]   Home Opioid hx:  [ ] I-Stop Reference No:  Living Situation: [ ]Home  [ ]Long term care  [ ]Rehab [ ]Other   Prior  to admission, pt resided at home with her daughter (2 steps to enter) and was  able to perform minimal adl's independently but pt's daughter assists with a  majority of adl's. Pt is mostly confined to her motorized recliner chair as per  pt's daughter for the past 6 months. Pt unable to ambulate. Pt has a walker,  wheelchair and commode at home but has not used recently. Pt on coumadin at  home and has her INR checked at home QFriday as arranged by pt's PCP Dr. Abdulaziz Pérez.  ADVANCE DIRECTIVES:    DNR/MOLST  [ ]  Living Will  [ ]   DECISION MAKER(s):  [ ] Health Care Proxy(s)  [ ] Surrogate(s)  [ ] Guardian           Name(s): Phone Number(s):    BASELINE (I)ADL(s) (prior to admission):  [place]: [ ]Total  [ ] Moderate [ ]Dependent    Allergies    No Known Allergies    Intolerances    MEDICATIONS  (STANDING):  artificial tears (preservative free) Ophthalmic Solution 1 Drop(s) Both EYES four times a day  ascorbic acid 500 milliGRAM(s) Oral daily  atenolol  Tablet 25 milliGRAM(s) Oral daily  chlorhexidine 4% Liquid 1 Application(s) Topical daily  cholecalciferol 1000 Unit(s) Oral daily  enoxaparin Injectable 40 milliGRAM(s) SubCutaneous every 24 hours  erythromycin   Ointment 1 Application(s) Left EYE at bedtime  latanoprost 0.005% Ophthalmic Solution 1 Drop(s) Both EYES at bedtime  multivitamin 1 Tablet(s) Oral daily  nystatin Powder 1 Application(s) Topical two times a day  ofloxacin 0.3% Solution 1 Drop(s) Left EYE four times a day  senna 2 Tablet(s) Oral at bedtime  trimethoprim   80 mG/sulfamethoxazole 400 mG 1 Tablet(s) Oral two times a day    MEDICATIONS  (PRN):  acetaminophen     Tablet .. 650 milliGRAM(s) Oral every 6 hours PRN Temp greater or equal to 38C (100.4F), Mild Pain (1 - 3)  aluminum hydroxide/magnesium hydroxide/simethicone Suspension 30 milliLiter(s) Oral every 4 hours PRN Dyspepsia  melatonin 3 milliGRAM(s) Oral at bedtime PRN Insomnia  ondansetron Injectable 4 milliGRAM(s) IV Push every 8 hours PRN Nausea and/or Vomiting    PRESENT SYMPTOMS: [ ]Unable to self-report  [ ] CPOT [ ] PAINADs [ ] RDOS  Source if other than patient:  [ ]Family   [ ]Team     Pain: [ ]yes [ ]no  QOL impact -   Location -                    Aggravating factors -  Quality -  Radiation -  Timing-  Severity (0-10 scale):  Minimal acceptable level (0-10 scale):     CPOT:    https://www.[facility].org/getattachment/[email or web address]/Critical-Care-Pain-Observation-Tool-(CPOT)    PAINAD Score: See PAINAD tool and score below     Dyspnea:                           [ ]Mild [ ]Moderate [ ]Severe    RDOS: See RDOS tool and score below   0 to 2  minimal or no respiratory distress   3  mild distress  4 to 6 moderate distress  >7 severe distress      Anxiety:                             [ ]Mild [ ]Moderate [ ]Severe  Fatigue:                             [ ]Mild [ ]Moderate [ ]Severe  Nausea:                             [ ]Mild [ ]Moderate [ ]Severe  Loss of appetite:              [ ]Mild [ ]Moderate [ ]Severe  Constipation:                    [ ]Mild [ ]Moderate [ ]Severe    PCSSQ[Palliative Care Spiritual Screening Question]   Severity (0-10):  Score of 4 or > indicate consideration of Chaplaincy referral.  Chaplaincy Referral: [ ] yes [ ] refused [ ] following [ ] Deferred     Caregiver [place]? : [ ] yes [ ] no [ ] Deferred [ ] Declined             Social work referral [ ] Patient & Family Centered Care Referral [ ]     Anticipatory Grief present?:  [ ] yes [ ] no  [ ] Deferred                  Social work referral [ ] Chaplaincy Referral [ ]    		  Other Symptoms:  [ ]All other review of systems negative     Palliative Performance Status Version 2:   See PPSv2 tool and score below          PHYSICAL EXAM:  Vital Signs Last 24 Hrs  T(C): 36.5 (14 Aug 2023 13:00), Max: 37.1 (13 Aug 2023 20:41)  T(F): 97.7 (14 Aug 2023 13:00), Max: 98.8 (13 Aug 2023 20:41)  HR: 65 (14 Aug 2023 13:00) (61 - 67)  BP: 112/57 (14 Aug 2023 13:00) (112/57 - 135/61)  BP(mean): --  RR: 18 (14 Aug 2023 13:00) (18 - 18)  SpO2: 97% (14 Aug 2023 13:00) (95% - 98%)    Parameters below as of 14 Aug 2023 13:00  Patient On (Oxygen Delivery Method): room air     I&O's Summary    13 Aug 2023 07:01  -  14 Aug 2023 07:00  --------------------------------------------------------  IN: 0 mL / OUT: 300 mL / NET: -300 mL      GENERAL: [ ]Cachexia    [ ]Alert  [ ]Oriented x   [ ]Lethargic  [ ]Unarousable  [ ]Verbal  [ ]Non-Verbal  Behavioral:   [ ] Anxiety  [ ] Delirium [ ] Agitation [ ] Other  HEENT:  [ ]Normal   [ ]Dry mouth   [ ]ET Tube/Trach  [ ]Oral lesions  PULMONARY:   [ ]Clear [ ]Tachypnea  [ ]Audible excessive secretions   [ ]Rhonchi        [ ]Right [ ]Left [ ]Bilateral  [ ]Crackles        [ ]Right [ ]Left [ ]Bilateral  [ ]Wheezing     [ ]Right [ ]Left [ ]Bilateral  [ ]Diminished breath sounds [ ]right [ ]left [ ]bilateral  CARDIOVASCULAR:    [ ]Regular [ ]Irregular [ ]Tachy  [ ]Albaro [ ]Murmur [ ]Other  GASTROINTESTINAL:  [ ]Soft  [ ]Distended   [ ]+BS  [ ]Non tender [ ]Tender  [ ]Other [ ]PEG [ ]OGT/ NGT  Last BM:  GENITOURINARY:  [ ]Normal [ ] Incontinent   [ ]Oliguria/Anuria   [ ]Drew  MUSCULOSKELETAL:   [ ]Normal   [ ]Weakness  [ ]Bed/Wheelchair bound [ ]Edema  NEUROLOGIC:   [ ]No focal deficits  [ ]Cognitive impairment  [ ]Dysphagia [ ]Dysarthria [ ]Paresis [ ]Other   SKIN:   [ ]Normal  [ ]Rash  [ ]Other  [ ]Pressure ulcer(s)       Present on admission [ ]y [ ]n    CRITICAL CARE:  [ ] Shock Present  [ ]Septic [ ]Cardiogenic [ ]Neurologic [ ]Hypovolemic  [ ]  Vasopressors [ ]  Inotropes   [ ]Respiratory failure present [ ]Mechanical ventilation [ ]Non-invasive ventilatory support [ ]High flow    [ ]Acute  [ ]Chronic [ ]Hypoxic  [ ]Hypercarbic [ ]Other  [ ]Other organ failure     LABS:                        10.6   6.37  )-----------( 373      ( 14 Aug 2023 07:25 )             33.8   08-14    138  |  103  |  18  ----------------------------<  106<H>  4.5   |  23  |  1.19    Ca    9.6      14 Aug 2023 07:21  Phos  2.5     08-14  Mg     2.2     08-14    TPro  5.8<L>  /  Alb  2.9<L>  /  TBili  0.2  /  DBili  x   /  AST  21  /  ALT  17  /  AlkPhos  79  08-14      Urinalysis Basic - ( 14 Aug 2023 07:21 )    Color: x / Appearance: x / SG: x / pH: x  Gluc: 106 mg/dL / Ketone: x  / Bili: x / Urobili: x   Blood: x / Protein: x / Nitrite: x   Leuk Esterase: x / RBC: x / WBC x   Sq Epi: x / Non Sq Epi: x / Bacteria: x      RADIOLOGY & ADDITIONAL STUDIES:  < from: CT Maxillofacial w/ IV Cont (08.10.23 @ 20:10) >    IMPRESSION:  Moderate periventricular white matter ischemia.    7.5 x 2.4   x 7.0 cm dermal mass in the LEFT forehead and periorbital region   suspicious for a neoplasm.      --- End of Report ---            LIU LOU MD; Attending Radiologist  This document has been electronically signed. Aug 10 2023  8:44PM    < end of copied text >    PROTEIN CALORIE MALNUTRITION PRESENT: [ ]mild [ ]moderate [ ]severe [ ]underweight [ ]morbid obesity  https://www.andeal.org/vault/1564/web/files/ONC/Table_Clinical%20Characteristics%20to%20Document%20Malnutrition-White%20JV%20et%20al%822494.pdf    Height (cm): 160 (08-11-23 @ 00:37)  Weight (kg): 75.7 (08-11-23 @ 00:37)  BMI (kg/m2): 29.6 (08-11-23 @ 00:37)    [ ]PPSV2 < or = to 30% [ ]significant weight loss  [ ]poor nutritional intake  [ ]anasarca[ ]Artificial Nutrition      Other REFERRALS:  [ ]Hospice  [ ]Child Life  [ ]Social Work  [ ]Case management [ ]Holistic Therapy     Goals of Care Document:  Date of Service:08-14-23 @ 15:15  HPI:   89y F pmh Basal cell Ca left cheek s/p mohs's ~4yr ago, DVT left leg on Coumadin, htn, hld, [place], macular degeneration p/w daughter/ HCP Gabi at bedside who pt lives w/ for c/o left facial mass. Pt has growing facial mass on left forehead xmonths. Have been in contact with doctor (Abdulaziz Pérez PCP) regarding mass, who have been doing watchful waiting of mass. Mass has now grown to point that it is pushing on eye, and recently in past few days hs been associated w/ pustular drainage. At home daughter has been cleaning mass w/ peroxide and applying OTC abx ointment. Pt now experiencing increased  pain/discomfort ( moderately relieved w/Tyelenol), vision obstruction, which prompted pt and family to come to ED for eval. Pt has had limited mobility since getting COVID 6 months ago, so daughter unable to take pt to appointment for eval.      ROS: Denies CP, SOB, palpitation, N/V/D, fever, cough, chills, dizziness, abm pain, recent travel, sick contact, change in bowel and urinary habits     A 10-system ROS was performed and is negative except as noted above and/or in the HPI.     (10 Aug 2023 22:38)    8/14 CT Head and Maxillofacial 8/11/23: 7.5 x 2.4 x 7.0 cm dermal mass in the left forehead and periorbital region suspicious for a neoplasm. As per Sx "- If surgery aligns with the patient's goal of care may benefit from a biopsy." As per ID, grew MSSA and Providencia Rettgeri." The patient was seen and examined with her daughter by her bedside.     Based on GOC note dated 8/11, "GOC discussed with family members; stated that at this point, pt. would like to be full code. They would like to entertain options for superficial surgical excision of the mass that do not involve general anesthesia."     Geriatrics and Palliative Medicine (GaP) Team was called for goals of care and advanced care planning.     PERTINENT PM/SXH:   HTN (hypertension)    DVT (deep venous thrombosis)    Macular degeneration    HLD (hyperlipidemia)    Breast CA    Basal cell carcinoma      S/P lumpectomy of breast    S/P cataract extraction    S/P tonsillectomy    S/P colon resection      FAMILY HISTORY:  FH: breast cancer (Sibling)      Family Hx substance abuse [ ]yes [ ]no  ITEMS NOT CHECKED ARE NOT PRESENT    SOCIAL HISTORY:   Significant other/partner[ ]  Children[ ]  Latter day/Spirituality:  Substance hx:  [ ]   Tobacco hx:  [ ]   Alcohol hx: [ ]   Home Opioid hx:  [ ] I-Stop Reference No:  Living Situation: [ ]Home  [ ]Long term care  [ ]Rehab [ ]Other  As per care coordination notes: Prior  to admission, pt resided at home with her daughter (2 steps to enter) and was  able to perform minimal adl's independently but pt's daughter assists with a  majority of adl's. Pt is mostly confined to her motorized recliner chair as per  pt's daughter for the past 6 months. Pt unable to ambulate. Pt has a walker,  wheelchair and commode at home but has not used recently. Pt on coumadin at  home and has her INR checked at home QFriday as arranged by pt's PCP Dr. Abdulaziz Pérez.  ADVANCE DIRECTIVES:    DNR/MOLST  [ ]  Living Will  [ ]   DECISION MAKER(s):  [ ] Health Care Proxy(s)  [ x] Surrogate(s)  [ ] Guardian           Name(s): Phone Number(s): Daughter. Gabi Jackson,[phone]    BASELINE (I)ADL(s) (prior to admission):  [place]: [ ]Total  [x ] Moderate [ ]Dependent    Allergies    No Known Allergies    Intolerances    MEDICATIONS  (STANDING):  artificial tears (preservative free) Ophthalmic Solution 1 Drop(s) Both EYES four times a day  ascorbic acid 500 milliGRAM(s) Oral daily  atenolol  Tablet 25 milliGRAM(s) Oral daily  chlorhexidine 4% Liquid 1 Application(s) Topical daily  cholecalciferol 1000 Unit(s) Oral daily  enoxaparin Injectable 40 milliGRAM(s) SubCutaneous every 24 hours  erythromycin   Ointment 1 Application(s) Left EYE at bedtime  latanoprost 0.005% Ophthalmic Solution 1 Drop(s) Both EYES at bedtime  multivitamin 1 Tablet(s) Oral daily  nystatin Powder 1 Application(s) Topical two times a day  ofloxacin 0.3% Solution 1 Drop(s) Left EYE four times a day  senna 2 Tablet(s) Oral at bedtime  trimethoprim   80 mG/sulfamethoxazole 400 mG 1 Tablet(s) Oral two times a day    MEDICATIONS  (PRN):  acetaminophen     Tablet .. 650 milliGRAM(s) Oral every 6 hours PRN Temp greater or equal to 38C (100.4F), Mild Pain (1 - 3)  aluminum hydroxide/magnesium hydroxide/simethicone Suspension 30 milliLiter(s) Oral every 4 hours PRN Dyspepsia  melatonin 3 milliGRAM(s) Oral at bedtime PRN Insomnia  ondansetron Injectable 4 milliGRAM(s) IV Push every 8 hours PRN Nausea and/or Vomiting    PRESENT SYMPTOMS: [ ]Unable to self-report  [ ] CPOT [ ] PAINADs [ ] RDOS  Source if other than patient:  [ ]Family   [ ]Team     Pain: [ ]yes [x ]no  QOL impact -   Location -                    Aggravating factors -  Quality -  Radiation -  Timing-  Severity (0-10 scale):  Minimal acceptable level (0-10 scale):     CPOT:    https://www.[facility].org/getattachment/[email or web address]/Critical-Care-Pain-Observation-Tool-(CPOT)    PAINAD Score: See PAINAD tool and score below     Dyspnea:                           [ ]Mild [ ]Moderate [ ]Severe    RDOS: See RDOS tool and score below   0 to 2  minimal or no respiratory distress   3  mild distress  4 to 6 moderate distress  >7 severe distress      Anxiety:                             [ ]Mild [ ]Moderate [ ]Severe  Fatigue:                             [ ]Mild [ ]Moderate [ ]Severe  Nausea:                             [ ]Mild [ ]Moderate [ ]Severe  Loss of appetite:              [ ]Mild [ ]Moderate [ ]Severe  Constipation:                    [ ]Mild [ ]Moderate [ ]Severe    PCSSQ[Palliative Care Spiritual Screening Question]   Severity (0-10):  Score of 4 or > indicate consideration of Chaplaincy referral.  Chaplaincy Referral: [ ] yes [ ] refused [ ] following [ ] Deferred   8/14 Palliative  visited while doing rounds. Pt was sleeping.  provided care by [facility] & silent prayer.  left a visiting card offering emotional & spiritual support to pt & family.   will remain available.  Caregiver [place]? : [ ] yes [ ] no [x ] Deferred [ ] Declined             Social work referral [ ] Patient & Family Centered Care Referral [ ]     Anticipatory Grief present?:  [ ] yes [ ] no  [x ] Deferred                  Social work referral [ ] Chaplaincy Referral [ ]    		  Other Symptoms:  [x ]All other review of systems negative but as per HPI     Palliative Performance Status Version 2:   See PPSv2 tool and score below          PHYSICAL EXAM:  Vital Signs Last 24 Hrs  T(C): 36.5 (14 Aug 2023 13:00), Max: 37.1 (13 Aug 2023 20:41)  T(F): 97.7 (14 Aug 2023 13:00), Max: 98.8 (13 Aug 2023 20:41)  HR: 65 (14 Aug 2023 13:00) (61 - 67)  BP: 112/57 (14 Aug 2023 13:00) (112/57 - 135/61)  BP(mean): --  RR: 18 (14 Aug 2023 13:00) (18 - 18)  SpO2: 97% (14 Aug 2023 13:00) (95% - 98%)    Parameters below as of 14 Aug 2023 13:00  Patient On (Oxygen Delivery Method): room air     I&O's Summary    13 Aug 2023 07:01  -  14 Aug 2023 07:00  --------------------------------------------------------  IN: 0 mL / OUT: 300 mL / NET: -300 mL      GENERAL: [ ]Cachexia    [x ]Alert  [x ]Oriented x 2-3   [ ]Lethargic  [ ]Unarousable  x ]Verbal  [ ]Non-Verbal  Behavioral:   [ ] Anxiety  [ ] Delirium [ ] Agitation [ ] Other  HEENT:  [ ]Normal   [ ]Dry mouth   [ ]ET Tube/Trach  [ ]Oral lesions [x] Large exophytic fungating mass, protruding from her left frontal region and pressing on her left eyelid.   PULMONARY:   [x ]Clear [ ]Tachypnea  [ ]Audible excessive secretions   [ ]Rhonchi        [ ]Right [ ]Left [ ]Bilateral  [ ]Crackles        [ ]Right [ ]Left [ ]Bilateral  [ ]Wheezing     [ ]Right [ ]Left [ ]Bilateral  [ ]Diminished breath sounds [ ]right [ ]left [ ]bilateral  CARDIOVASCULAR:    [x ]Regular [ ]Irregular [ ]Tachy  [ ]Albaro [ ]Murmur [ ]Other  GASTROINTESTINAL:  [x ]Soft  [ ]Distended   [x ]+BS  [ x]Non tender [ ]Tender  [ ]Other [ ]PEG [ ]OGT/ NGT  Last BM: 8/16  GENITOURINARY:  [x ]Normal [ ] Incontinent   [ ]Oliguria/Anuria   [ ]Drew  MUSCULOSKELETAL:   [ ]Normal   [ x]Weakness  [ ]Bed/Wheelchair bound [ ]Edema  NEUROLOGIC:   [ ]No focal deficits  [ ]Cognitive impairment  [ ]Dysphagia [ ]Dysarthria [ ]Paresis [x ]Other: Basic understanding about her illness and narrow capacity to discuss about her Rx options. Hence, needing support from her surrogate to secure appropriate understanding and appreciation about her illness and treating options.   SKIN:   [ ]Normal  [ ]Rash  [ ]Other  [ ]Pressure ulcer(s)       Present on admission [ ]y [ ]n  [x] Large frontal mass as described above.     CRITICAL CARE:  [ ] Shock Present  [ ]Septic [ ]Cardiogenic [ ]Neurologic [ ]Hypovolemic  [ ]  Vasopressors [ ]  Inotropes   [ ]Respiratory failure present [ ]Mechanical ventilation [ ]Non-invasive ventilatory support [ ]High flow    [ ]Acute  [ ]Chronic [ ]Hypoxic  [ ]Hypercarbic [ ]Other  [ ]Other organ failure     LABS:                        10.6   6.37  )-----------( 373      ( 14 Aug 2023 07:25 )             33.8   08-14    138  |  103  |  18  ----------------------------<  106<H>  4.5   |  23  |  1.19    Ca    9.6      14 Aug 2023 07:21  Phos  2.5     08-14  Mg     2.2     08-14    TPro  5.8<L>  /  Alb  2.9<L>  /  TBili  0.2  /  DBili  x   /  AST  21  /  ALT  17  /  AlkPhos  79  08-14      Urinalysis Basic - ( 14 Aug 2023 07:21 )    Color: x / Appearance: x / SG: x / pH: x  Gluc: 106 mg/dL / Ketone: x  / Bili: x / Urobili: x   Blood: x / Protein: x / Nitrite: x   Leuk Esterase: x / RBC: x / WBC x   Sq Epi: x / Non Sq Epi: x / Bacteria: x      RADIOLOGY & ADDITIONAL STUDIES:  < from: CT Maxillofacial w/ IV Cont (08.10.23 @ 20:10) >    IMPRESSION:  Moderate periventricular white matter ischemia.    7.5 x 2.4   x 7.0 cm dermal mass in the LEFT forehead and periorbital region   suspicious for a neoplasm.      --- End of Report ---            LIU LOU MD; Attending Radiologist  This document has been electronically signed. Aug 10 2023  8:44PM    < end of copied text >    PROTEIN CALORIE MALNUTRITION PRESENT: [ ]mild [ ]moderate [ ]severe [ ]underweight [ ]morbid obesity  https://www.andeal.org/vault/2440/web/files/ONC/Table_Clinical%20Characteristics%20to%[email or web address]-White%[email or web address]%[email or web address]%[email or web address]    Height (cm): 160 (08-11-23 @ 00:37)  Weight (kg): 75.7 (08-11-23 @ 00:37)  BMI (kg/m2): 29.6 (08-11-23 @ 00:37)    [ ]PPSV2 < or = to 30% [ ]significant weight loss  [ ]poor nutritional intake  [ ]anasarca[ ]Artificial Nutrition      Other REFERRALS:  [ ]Hospice  [ ]Child Life  [ ]Social Work  [ ]Case management [ ]Holistic Therapy     Goals of Care Document:

## 2023-08-14 NOTE — PROGRESS NOTE ADULT - SUBJECTIVE AND OBJECTIVE BOX
SURGERY PROGRESS NOTE    Interval events  - No acute events.  - Vital signs stable, afebrile, on room air.   - nursing reports mass will bleed when repositioning.  - Palliative Consult today > They would like to entertain options for superficial surgical excision of the mass that do not involve general anesthesia    Vital Signs Last 24 Hrs  T(C): 36.5 (14 Aug 2023 13:00), Max: 37.1 (13 Aug 2023 20:41)  T(F): 97.7 (14 Aug 2023 13:00), Max: 98.8 (13 Aug 2023 20:41)  HR: 65 (14 Aug 2023 13:00) (61 - 67)  BP: 112/57 (14 Aug 2023 13:00) (112/57 - 135/61)  BP(mean): --  RR: 18 (14 Aug 2023 13:00) (18 - 18)  SpO2: 97% (14 Aug 2023 13:00) (95% - 98%)    Parameters below as of 14 Aug 2023 13:00  Patient On (Oxygen Delivery Method): room air        OBJECTIVE: T(C): 36.5 (08-14-23 @ 13:00), Max: 37.1 (08-13-23 @ 20:41)  HR: 65 (08-14-23 @ 13:00) (61 - 67)  BP: 112/57 (08-14-23 @ 13:00) (112/57 - 135/61)  RR: 18 (08-14-23 @ 13:00) (18 - 18)  SpO2: 97% (08-14-23 @ 13:00) (95% - 98%)  Wt(kg): --  I&O's Summary    13 Aug 2023 07:01  -  14 Aug 2023 07:00  --------------------------------------------------------  IN: 0 mL / OUT: 300 mL / NET: -300 mL      I&O's Detail    13 Aug 2023 07:01  -  14 Aug 2023 07:00  --------------------------------------------------------  IN:  Total IN: 0 mL    OUT:    Voided (mL): 300 mL  Total OUT: 300 mL    Total NET: -300 mL          MEDICATIONS  (STANDING):  artificial tears (preservative free) Ophthalmic Solution 1 Drop(s) Both EYES four times a day  ascorbic acid 500 milliGRAM(s) Oral daily  atenolol  Tablet 25 milliGRAM(s) Oral daily  chlorhexidine 4% Liquid 1 Application(s) Topical daily  cholecalciferol 1000 Unit(s) Oral daily  enoxaparin Injectable 40 milliGRAM(s) SubCutaneous every 24 hours  erythromycin   Ointment 1 Application(s) Left EYE at bedtime  latanoprost 0.005% Ophthalmic Solution 1 Drop(s) Both EYES at bedtime  multivitamin 1 Tablet(s) Oral daily  nystatin Powder 1 Application(s) Topical two times a day  ofloxacin 0.3% Solution 1 Drop(s) Left EYE four times a day  senna 2 Tablet(s) Oral at bedtime  trimethoprim   80 mG/sulfamethoxazole 400 mG 1 Tablet(s) Oral two times a day    MEDICATIONS  (PRN):  acetaminophen     Tablet .. 650 milliGRAM(s) Oral every 6 hours PRN Temp greater or equal to 38C (100.4F), Mild Pain (1 - 3)  aluminum hydroxide/magnesium hydroxide/simethicone Suspension 30 milliLiter(s) Oral every 4 hours PRN Dyspepsia  melatonin 3 milliGRAM(s) Oral at bedtime PRN Insomnia  ondansetron Injectable 4 milliGRAM(s) IV Push every 8 hours PRN Nausea and/or Vomiting      LABS:                        10.6   6.37  )-----------( 373      ( 14 Aug 2023 07:25 )             33.8     08-14    138  |  103  |  18  ----------------------------<  106<H>  4.5   |  23  |  1.19    Ca    9.6      14 Aug 2023 07:21  Phos  2.5     08-14  Mg     2.2     08-14    TPro  5.8<L>  /  Alb  2.9<L>  /  TBili  0.2  /  DBili  x   /  AST  21  /  ALT  17  /  AlkPhos  79  08-14      Urinalysis Basic - ( 14 Aug 2023 07:21 )    Color: x / Appearance: x / SG: x / pH: x  Gluc: 106 mg/dL / Ketone: x  / Bili: x / Urobili: x   Blood: x / Protein: x / Nitrite: x   Leuk Esterase: x / RBC: x / WBC x   Sq Epi: x / Non Sq Epi: x / Bacteria: x        RADIOLOGY & ADDITIONAL STUDIES:    PHYSICAL EXAM  General: No acute distress, resting in bed  HEAD:  Atraumatic, Normocephalic. 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 Pt has growing facial mass on left forehead  6 months. Her PCP was doing watchful waiting of mass. Mass has now grown to point that it is pushing on eye, and recently in past few days hs been associated w/ pustular drainage.  Patient now experiencing increased  pain/discomfort ( moderately relieved w/Tyelenol), vision obstruction, which prompted pt and family to come to ED for eval. Pt has had limited mobility since getting COVID 6 months ago. CT scan shows 7.5 x 2.4 x 7.0 cm dermal mass in the LEFT forehead and periorbital region suspicious for a neoplasm. Patient met with Palliative today and is interested in surgical excision that does not require general anesthesia.    - surgery following while inpatient.  - care per primary team.      [facility]  [id number]

## 2023-08-14 NOTE — PROGRESS NOTE ADULT - PROBLEM SELECTOR PLAN 6
DVT Ppx: Lovenox  Diet: Regular  Disposition: To home pending course DVT ppx: Lovenox  Diet: Regular  Disposition: To home pending course

## 2023-08-14 NOTE — CONSULT NOTE ADULT - TIME BILLING
Total time spent including the following  [x] Physical chart review and documentation   An extensive review of the physical chart, electronic health record, and documentation was conducted to obtain collateral information including but not limited to:   - Current inpatient records (ED, H&P, primary team, and consultants [surgery, opthalmology, and ID )   - Inpatient values/results (CBC, CMP, CT)   - Social work assessments   - Current or proposed treatment plans   - Pharmacotherapy review   [x]care coordination  [x]discussion with the primary team (Dr. Casiano)  [x]discussion with consultant(s) (Sx. Lyle Ceron)  [x]discussion with the patient, surrogate decision maker, or family  [x]Physical Exam and/or review of systems   [x]Formulation of assessment and plan

## 2023-08-14 NOTE — PROGRESS NOTE ADULT - ASSESSMENT
Patient is a 89F who presented to the hospital for left facial mass and is consulted for bacterial conjunctivitis that grew MSSA and Providencia Rettgeri.    Recs:  -Treat or not?  -Note incomplete

## 2023-08-14 NOTE — CONSULT NOTE ADULT - ASSESSMENT
full note to f/u.     The patient was seen and examined with her daughter (and surrogate) by her beside.     GOC: The patient and her daughter gave verbalized understanding about her current illness (fungating mass with possible Basal Cell CA). At this point their hope is that something (some kind of procedure) may be done to remove(even partially) the mass, so she the patient is able to open her left eye and have a less burdensome lesion in her head. Also, her daughter expressed concerns about a possible infection of the mass and was not able to understand how her mother was going to come out of the hospital while having puss coming out of the lesion. At this point they would like inputs from someone (I am not sure if it will be Onc, Plastic Sx, or ENT), so they can understand any possible treatment options (if any).     Primary team to determine which speciality can better define a Dx and Rx options so GOC can be determined.         Blake Watson MD  Associate Chief Geriatrics and Palliative Care (GaP) [facility] Director GaP Consult Service   , [employer] at [facility]      Please contact me via Teams from Monday through Friday between 9am-5pm. If not answering, please call the palliative care pager [phone]    After 5pm and on weekends, please see the contact information below:    In the event of newly developing, evolving, or worsening symptoms, please contact the Palliative Medicine team via pager (if the patient is at [facility] #[phone] or if the patient is at [facility] #[phone]) The Geriatric and Palliative Medicine service has coverage 24 hours a day/ 7 days a week to provide medical recommendations regarding symptom management needs via telephone 89y F with h/o Basal cell Ca over her left cheek s/p Mohs ~4yr ago, DVT on Coumadin, htn, HLD, [place], macular degeneration p/w a large left frontal fungating mass. Geriatrics and Palliative Medicine (GaP) Team was called for goals of care (GOC) and advanced care planning (ACP).

## 2023-08-14 NOTE — PROGRESS NOTE ADULT - SUBJECTIVE AND OBJECTIVE BOX
Ricky Shellkendall| PGY-1  Internal Medicine    OVERNIGHT EVENTS: no overnight events      SUBJECTIVE: Patient was examined at bedside this morning. Appeared comfortable. Overnight vitals and monitoring results were reviewed. Reporting no complaints. Denied chest pain, SOB, abdominal pain, vomiting, diarrhea, constipation.       MEDICATIONS  (STANDING):  artificial tears (preservative free) Ophthalmic Solution 1 Drop(s) Both EYES four times a day  ascorbic acid 500 milliGRAM(s) Oral daily  atenolol  Tablet 25 milliGRAM(s) Oral daily  chlorhexidine 4% Liquid 1 Application(s) Topical daily  cholecalciferol 1000 Unit(s) Oral daily  enoxaparin Injectable 40 milliGRAM(s) SubCutaneous every 24 hours  erythromycin   Ointment 1 Application(s) Left EYE at bedtime  latanoprost 0.005% Ophthalmic Solution 1 Drop(s) Both EYES at bedtime  multivitamin 1 Tablet(s) Oral daily  nystatin Powder 1 Application(s) Topical two times a day  ofloxacin 0.3% Solution 1 Drop(s) Left EYE four times a day  senna 2 Tablet(s) Oral at bedtime  trimethoprim   80 mG/sulfamethoxazole 400 mG 1 Tablet(s) Oral two times a day    MEDICATIONS  (PRN):  acetaminophen     Tablet .. 650 milliGRAM(s) Oral every 6 hours PRN Temp greater or equal to 38C (100.4F), Mild Pain (1 - 3)  aluminum hydroxide/magnesium hydroxide/simethicone Suspension 30 milliLiter(s) Oral every 4 hours PRN Dyspepsia  melatonin 3 milliGRAM(s) Oral at bedtime PRN Insomnia  ondansetron Injectable 4 milliGRAM(s) IV Push every 8 hours PRN Nausea and/or Vomiting        T(F): 97.7 (08-14-23 @ 04:20), Max: 98.8 (08-13-23 @ 20:41)  HR: 67 (08-14-23 @ 04:20) (61 - 67)  BP: 127/60 (08-14-23 @ 04:20) (127/60 - 135/61)  BP(mean): --  RR: 18 (08-14-23 @ 04:20) (18 - 18)  SpO2: 96% (08-14-23 @ 04:20) (95% - 98%)    PHYSICAL EXAM:     GENERAL: NAD, lying in bed comfortably  HEAD:  Atraumatic, Normocephalic  EYES: EOMI, PERRLA, conjunctiva and sclera clear, no nystagmus noted  ENT: Moist mucous membranes,   NECK: Supple, No JVD, trachea midline  CHEST/LUNG: Clear to auscultation bilaterally; No rales, rhonchi, wheezing, or rubs. Unlabored respirations  HEART: Regular rate and rhythm; No murmurs, rubs, or gallops, normal S1/S2  ABDOMEN: normal bowel sounds; Soft, nontender, nondistended, no organomegaly   EXTREMITIES:  2+ Peripheral Pulses, brisk capillary refill. No clubbing, cyanosis, or edema  MSK: No gross deformities noted   Neurological:  A&Ox3, no focal deficits   SKIN: No rashes or lesions  PSYCH: Normal mood, affect     TELEMETRY:    LABS:                        10.6   6.37  )-----------( 373      ( 14 Aug 2023 07:25 )             33.8     08-13    138  |  103  |  21  ----------------------------<  130<H>  4.3   |  23  |  1.18    Ca    9.8      13 Aug 2023 07:22  Phos  3.0     08-13  Mg     2.2     08-13              Creatinine Trend: 1.18<--, 1.04<--, 0.86<--, 0.80<--  I&O's Summary    13 Aug 2023 07:01  -  14 Aug 2023 07:00  --------------------------------------------------------  IN: 0 mL / OUT: 300 mL / NET: -300 mL      BNP    RADIOLOGY & ADDITIONAL STUDIES:             Rickymi Casiano| PGY-1  Internal Medicine    OVERNIGHT EVENTS: no overnight events      SUBJECTIVE: Patient was examined at bedside this morning. Was complaining of pain from the mass as well as discharge and blood running into her eyes. Denied chest pain, SOB, abdominal pain, vomiting, diarrhea, constipation.       MEDICATIONS  (STANDING):  artificial tears (preservative free) Ophthalmic Solution 1 Drop(s) Both EYES four times a day  ascorbic acid 500 milliGRAM(s) Oral daily  atenolol  Tablet 25 milliGRAM(s) Oral daily  chlorhexidine 4% Liquid 1 Application(s) Topical daily  cholecalciferol 1000 Unit(s) Oral daily  enoxaparin Injectable 40 milliGRAM(s) SubCutaneous every 24 hours  erythromycin   Ointment 1 Application(s) Left EYE at bedtime  latanoprost 0.005% Ophthalmic Solution 1 Drop(s) Both EYES at bedtime  multivitamin 1 Tablet(s) Oral daily  nystatin Powder 1 Application(s) Topical two times a day  ofloxacin 0.3% Solution 1 Drop(s) Left EYE four times a day  senna 2 Tablet(s) Oral at bedtime  trimethoprim   80 mG/sulfamethoxazole 400 mG 1 Tablet(s) Oral two times a day    MEDICATIONS  (PRN):  acetaminophen     Tablet .. 650 milliGRAM(s) Oral every 6 hours PRN Temp greater or equal to 38C (100.4F), Mild Pain (1 - 3)  aluminum hydroxide/magnesium hydroxide/simethicone Suspension 30 milliLiter(s) Oral every 4 hours PRN Dyspepsia  melatonin 3 milliGRAM(s) Oral at bedtime PRN Insomnia  ondansetron Injectable 4 milliGRAM(s) IV Push every 8 hours PRN Nausea and/or Vomiting        T(F): 97.7 (08-14-23 @ 04:20), Max: 98.8 (08-13-23 @ 20:41)  HR: 67 (08-14-23 @ 04:20) (61 - 67)  BP: 127/60 (08-14-23 @ 04:20) (127/60 - 135/61)  BP(mean): --  RR: 18 (08-14-23 @ 04:20) (18 - 18)  SpO2: 96% (08-14-23 @ 04:20) (95% - 98%)    PHYSICAL EXAM:     GENERAL: NAD, lying in bed comfortably  HEAD:  Atraumatic, Normocephalic  EYES: EOMI, PERRLA, conjunctiva and sclera clear, no nystagmus noted  ENT: Moist mucous membranes,   NECK: Trachea midline  CHEST/LUNG: Clear to auscultation bilaterally; No rales, rhonchi, wheezing, or rubs. Unlabored respirations  HEART: Regular rate and rhythm; No murmurs, rubs, or gallops, normal S1/S2  ABDOMEN: Soft, nontender, nondistended  EXTREMITIES:  No clubbing, cyanosis, or edema  MSK: No gross deformities noted   Neurological:  A&Ox3, no focal deficits   SKIN: Fungating left supraorbital mass draining blood and purulent discharge.  PSYCH: Normal mood, affect     TELEMETRY:    LABS:                        10.6   6.37  )-----------( 373      ( 14 Aug 2023 07:25 )             33.8     08-13    138  |  103  |  21  ----------------------------<  130<H>  4.3   |  23  |  1.18    Ca    9.8      13 Aug 2023 07:22  Phos  3.0     08-13  Mg     2.2     08-13              Creatinine Trend: 1.18<--, 1.04<--, 0.86<--, 0.80<--  I&O's Summary    13 Aug 2023 07:01  -  14 Aug 2023 07:00  --------------------------------------------------------  IN: 0 mL / OUT: 300 mL / NET: -300 mL      BNP    RADIOLOGY & ADDITIONAL STUDIES:

## 2023-08-14 NOTE — CONSULT NOTE ADULT - SUBJECTIVE AND OBJECTIVE BOX
HPI:  Patient is a 89F who presented to the hospital for left facial mass and is consulted for bacterial conjunctivitis that grew MSSA and Providencia Rettgeri. Pt has a hx of Basal cell Ca left cheek s/p mohs's ~4yr ago, DVT left leg on Coumadin, htn, hld, [place], macular degeneration.     Pt has growing facial mass on left forehead for 6 months. Have been in contact with doctor (Abdulaziz Pérez PCP) regarding mass, who have been doing watchful waiting of mass. Mass has now grown to point that it is pushing on eye, and recently in past few days hs been associated w/ pustular drainage. At home daughter has been cleaning mass w/ peroxide and applying OTC abx ointment. Pt now experiencing increased  pain/discomfort ( moderately relieved w/Tyelenol), vision obstruction, which prompted pt and family to come to ED for eval. Pt has had limited mobility since getting COVID 6 months ago, so daughter unable to take pt to appointment for eval.    ROS: Denies CP, SOB, N/V/D, fever, cough, chills, dizziness, abm pain, recent travel, sick contact, change in bowel habits, dysuria    A 10-system ROS was performed and is negative except as noted above and/or in the HPI.         REVIEW OF SYSTEMS  Constitutional: No fevers, chills, weight loss or fatigue   Skin: No rash, no phlebitis	  Eyes: No discharge	  ENMT: No sore throat, oral thrush, ulcers or exudate  Respiratory: No cough, no SOB  Cardiovascular:  No chest pain, palpitations or edema   Gastrointestinal: No pain, nausea, vomiting, diarrhea or constipation	  Genitourinary: No dysuria, discharge or flank pain  MSK: No arthralgias or back pain   Neurological: No HA, no weakness, no seizures, no AMS       prior hospital charts reviewed [V]  primary team notes reviewed [V]  other consultant notes reviewed [V]    PAST MEDICAL & SURGICAL HISTORY:  HTN (hypertension)      DVT (deep venous thrombosis)  left leg      Macular degeneration      HLD (hyperlipidemia)      Breast CA      Basal cell carcinoma      S/P lumpectomy of breast      S/P cataract extraction      S/P tonsillectomy      S/P colon resection          SOCIAL HISTORY:  - Denied smoking/vaping/alcohol/recreational drug use    FAMILY HISTORY:  FH: breast cancer (Sibling)        Allergies  No Known Allergies        ANTIMICROBIALS:  trimethoprim   80 mG/sulfamethoxazole 400 mG 1 two times a day      ANTIMICROBIALS (past 90 days):  MEDICATIONS  (STANDING):  trimethoprim   80 mG/sulfamethoxazole 400 mG   1 Tablet(s) Oral (08-14-23 @ 05:20)   1 Tablet(s) Oral (08-13-23 @ 18:21)   1 Tablet(s) Oral (08-13-23 @ 05:38)   1 Tablet(s) Oral (08-12-23 @ 17:32)   1 Tablet(s) Oral (08-12-23 @ 05:07)   1 Tablet(s) Oral (08-11-23 @ 17:07)        OTHER MEDS:   MEDICATIONS  (STANDING):  acetaminophen     Tablet .. 650 every 6 hours PRN  aluminum hydroxide/magnesium hydroxide/simethicone Suspension 30 every 4 hours PRN  atenolol  Tablet 25 daily  enoxaparin Injectable 40 every 24 hours  melatonin 3 at bedtime PRN  ondansetron Injectable 4 every 8 hours PRN  senna 2 at bedtime      VITALS:  Vital Signs Last 24 Hrs  T(F): 97.7 (08-14-23 @ 13:00), Max: 98.8 (08-13-23 @ 20:41)    Vital Signs Last 24 Hrs  HR: 65 (08-14-23 @ 13:00) (61 - 67)  BP: 112/57 (08-14-23 @ 13:00) (112/57 - 135/61)  RR: 18 (08-14-23 @ 13:00)  SpO2: 97% (08-14-23 @ 13:00) (95% - 98%)  Wt(kg): --    EXAM:  General: Patient in no acute distress   HEENT: large left fungating mass with purulent drainage, left eye compressed by mass, right eye normal appearance  CV: S1+S2, no m/r/g appreciated   Lungs: No respiratory distress, CTAB  Abd: Soft, nontender, no guarding, no rebound tenderness, + bowel sounds   Ext: No cyanosis, no edema, mild tenderness on joints  Neuro: Alert and oriented, no focal deficits, CN II-XII grossly intact   Skin: No rash   IV: No phlebitis    Labs:                        10.6   6.37  )-----------( 373      ( 14 Aug 2023 07:25 )             33.8     08-14    138  |  103  |  18  ----------------------------<  106<H>  4.5   |  23  |  1.19    Ca    9.6      14 Aug 2023 07:21  Phos  2.5     08-14  Mg     2.2     08-14    TPro  5.8<L>  /  Alb  2.9<L>  /  TBili  0.2  /  DBili  x   /  AST  21  /  ALT  17  /  AlkPhos  79  08-14      WBC Trend:  WBC Count: 6.37 (08-14-23 @ 07:25)  WBC Count: 6.45 (08-13-23 @ 07:21)  WBC Count: 7.64 (08-12-23 @ 07:30)  WBC Count: 10.48 (08-11-23 @ 06:28)      Auto Neutrophil #: 4.16 K/uL (08-14-23 @ 07:25)  Auto Neutrophil #: 4.47 K/uL (08-13-23 @ 07:21)  Auto Neutrophil #: 5.28 K/uL (08-12-23 @ 07:30)  Auto Neutrophil #: 6.31 K/uL (08-10-23 @ 15:59)      Creatine Trend:  Creatinine: 1.19 (08-14)  Creatinine: 1.18 (08-13)  Creatinine: 1.04 (08-12)  Creatinine: 0.86 (08-11)      Liver Biochemical Testing Trend:  Alanine Aminotransferase (ALT/SGPT): 17 (08-14)  Alanine Aminotransferase (ALT/SGPT): 17 (08-10)  Aspartate Aminotransferase (AST/SGOT): 21 (08-14-23 @ 07:21)  Aspartate Aminotransferase (AST/SGOT): 18 (08-10-23 @ 15:59)  Bilirubin Total: 0.2 (08-14)  Bilirubin Total: 0.1 (08-10)      Trend LDH      Auto Eosinophil %: 3.9 % (08-14-23 @ 07:25)  Auto Eosinophil %: 3.4 % (08-13-23 @ 07:21)  Auto Eosinophil %: 2.7 % (08-12-23 @ 07:30)      Urinalysis Basic - ( 14 Aug 2023 07:21 )    Color: x / Appearance: x / SG: x / pH: x  Gluc: 106 mg/dL / Ketone: x  / Bili: x / Urobili: x   Blood: x / Protein: x / Nitrite: x   Leuk Esterase: x / RBC: x / WBC x   Sq Epi: x / Non Sq Epi: x / Bacteria: x        MICROBIOLOGY:    MRSA PCR Result.: Christopher (08-11-23 @ 18:07)      Culture - Eye (collected 11 Aug 2023 02:22)  Source: .Eye Conjunctiva-Left  Preliminary Report:    Few Providencia rettgeri    Few Staphylococcus aureus    Moderate Corynebacterium amycolatum "Susceptibilities not performed"  Organism: Providencia rettgeri  Staphylococcus aureus  Organism: Providencia rettgeri    Sensitivities:      Method Type: AMADOR      -  Amikacin: S <=16      -  Amoxicillin/Clavulanic Acid: R 16/8      -  Ampicillin: R 16      -  Ampicillin/Sulbactam: S <=4/2      -  Aztreonam: S <=4      -  Cefazolin: R >16      -  Cefepime: S <=2      -  Cefoxitin: S <=8      -  Ceftriaxone: S <=1      -  Ciprofloxacin: S <=0.25      -  Ertapenem: S <=0.5      -  Gentamicin: S <=2      -  Imipenem: S <=1      -  Levofloxacin: S <=0.5      -  Meropenem: S <=1      -  Piperacillin/Tazobactam: S <=8      -  Tobramycin: S <=2      -  Trimethoprim/Sulfamethoxazole: S <=0.5/9.5  Organism: Staphylococcus aureus    Sensitivities:      Method Type: AMADOR      -  Ampicillin/Sulbactam: S <=8/4      -  Cefazolin: S <=4      -  Clindamycin: R <=0.25 This isolate is presumed to be clindamycin resistant based on detection of inducible resistance. Clindamycin may still be effective in some patients.      -  Erythromycin: R >4      -  Gentamicin: S <=1 Should not be used as monotherapy      -  Oxacillin: S <=0.25 Oxacillin predicts susceptibility for dicloxacillin, methicillin, and nafcillin      -  Penicillin: R >8      -  Rifampin: S <=1 Should not be used as monotherapy      -  Tetracycline: S <=1      -  Trimethoprim/Sulfamethoxazole: S <=0.5/9.5      -  Vancomycin: S 2                                                    RADIOLOGY:  imaging below personally reviewed   HPI adapted from H&P:  Patient is a 89F who presented to the hospital for left facial mass and is consulted for bacterial conjunctivitis that grew MSSA and Providencia Rettgeri. Pt has a hx of Basal cell Ca left cheek s/p mohs's ~4yr ago, DVT left leg on Coumadin, htn, hld, [place], macular degeneration.     Pt has growing facial mass on left forehead for 6 months. Have been in contact with doctor (Abdulaziz Pérez PCP) regarding mass, who have been doing watchful waiting of mass. Mass has now grown to point that it is pushing on eye, and recently in past few days hs been associated w/ pustular drainage. At home daughter has been cleaning mass w/ peroxide and applying OTC abx ointment. Pt now experiencing increased  pain/discomfort ( moderately relieved w/Tyelenol), vision obstruction, which prompted pt and family to come to ED for eval. Pt has had limited mobility since getting COVID 6 months ago, so daughter unable to take pt to appointment for eval.    Per chart the patient had mucopurulent drainage of the left eye in the ED along with surrounding edema. CT head shows the mass to by 7.5 x 2.4 x 7.0 cm. Opthalmology has been consulted and patient has been placed on ofloxacin drops and erythromycin ointment for the conjunctivitis along with oral bactrim for surrounding facial edema and erythema.     ROS: Denies CP, SOB, N/V/D, fever, cough, chills, dizziness, abm pain, recent travel, sick contact, change in bowel habits, dysuria    A 10-system ROS was performed and is negative except as noted above and/or in the HPI.    REVIEW OF SYSTEMS  Constitutional: No fevers, chills, weight loss or fatigue   Skin: No rash, no phlebitis	  Eyes: No discharge	  ENMT: No sore throat, oral thrush, ulcers or exudate  Respiratory: No cough, no SOB  Cardiovascular:  No chest pain, palpitations or edema   Gastrointestinal: No pain, nausea, vomiting, diarrhea or constipation	  Genitourinary: No dysuria, discharge or flank pain  MSK: No arthralgias or back pain   Neurological: No HA, no weakness, no seizures, no AMS       prior hospital charts reviewed [V]  primary team notes reviewed [V]  other consultant notes reviewed [V]    PAST MEDICAL & SURGICAL HISTORY:  HTN (hypertension)      DVT (deep venous thrombosis)  left leg      Macular degeneration      HLD (hyperlipidemia)      Breast CA      Basal cell carcinoma      S/P lumpectomy of breast      S/P cataract extraction      S/P tonsillectomy      S/P colon resection          SOCIAL HISTORY:  - Denied smoking/vaping/alcohol/recreational drug use    FAMILY HISTORY:  FH: breast cancer (Sibling)        Allergies  No Known Allergies        ANTIMICROBIALS:  trimethoprim   80 mG/sulfamethoxazole 400 mG 1 two times a day      ANTIMICROBIALS (past 90 days):  MEDICATIONS  (STANDING):  trimethoprim   80 mG/sulfamethoxazole 400 mG   1 Tablet(s) Oral (08-14-23 @ 05:20)   1 Tablet(s) Oral (08-13-23 @ 18:21)   1 Tablet(s) Oral (08-13-23 @ 05:38)   1 Tablet(s) Oral (08-12-23 @ 17:32)   1 Tablet(s) Oral (08-12-23 @ 05:07)   1 Tablet(s) Oral (08-11-23 @ 17:07)        OTHER MEDS:   MEDICATIONS  (STANDING):  acetaminophen     Tablet .. 650 every 6 hours PRN  aluminum hydroxide/magnesium hydroxide/simethicone Suspension 30 every 4 hours PRN  atenolol  Tablet 25 daily  enoxaparin Injectable 40 every 24 hours  melatonin 3 at bedtime PRN  ondansetron Injectable 4 every 8 hours PRN  senna 2 at bedtime      VITALS:  Vital Signs Last 24 Hrs  T(F): 97.7 (08-14-23 @ 13:00), Max: 98.8 (08-13-23 @ 20:41)    Vital Signs Last 24 Hrs  HR: 65 (08-14-23 @ 13:00) (61 - 67)  BP: 112/57 (08-14-23 @ 13:00) (112/57 - 135/61)  RR: 18 (08-14-23 @ 13:00)  SpO2: 97% (08-14-23 @ 13:00) (95% - 98%)  Wt(kg): --    EXAM:  General: Patient in no acute distress   HEENT: large left fungating mass with purulent drainage, left eye compressed by mass with minimal injection, right eye normal appearance  CV: S1+S2, no m/r/g appreciated   Lungs: No respiratory distress, CTAB  Abd: Soft, nontender, no guarding, no rebound tenderness, +bowel sounds   Ext: No cyanosis, no edema, mild tenderness on joints  Neuro: Alert and oriented, no focal deficits, CN II-XII grossly intact   Skin: No rash   IV: No phlebitis    Labs:                        10.6   6.37  )-----------( 373      ( 14 Aug 2023 07:25 )             33.8     08-14    138  |  103  |  18  ----------------------------<  106<H>  4.5   |  23  |  1.19    Ca    9.6      14 Aug 2023 07:21  Phos  2.5     08-14  Mg     2.2     08-14    TPro  5.8<L>  /  Alb  2.9<L>  /  TBili  0.2  /  DBili  x   /  AST  21  /  ALT  17  /  AlkPhos  79  08-14      WBC Trend:  WBC Count: 6.37 (08-14-23 @ 07:25)  WBC Count: 6.45 (08-13-23 @ 07:21)  WBC Count: 7.64 (08-12-23 @ 07:30)  WBC Count: 10.48 (08-11-23 @ 06:28)      Auto Neutrophil #: 4.16 K/uL (08-14-23 @ 07:25)  Auto Neutrophil #: 4.47 K/uL (08-13-23 @ 07:21)  Auto Neutrophil #: 5.28 K/uL (08-12-23 @ 07:30)  Auto Neutrophil #: 6.31 K/uL (08-10-23 @ 15:59)      Creatine Trend:  Creatinine: 1.19 (08-14)  Creatinine: 1.18 (08-13)  Creatinine: 1.04 (08-12)  Creatinine: 0.86 (08-11)      Liver Biochemical Testing Trend:  Alanine Aminotransferase (ALT/SGPT): 17 (08-14)  Alanine Aminotransferase (ALT/SGPT): 17 (08-10)  Aspartate Aminotransferase (AST/SGOT): 21 (08-14-23 @ 07:21)  Aspartate Aminotransferase (AST/SGOT): 18 (08-10-23 @ 15:59)  Bilirubin Total: 0.2 (08-14)  Bilirubin Total: 0.1 (08-10)      Trend LDH      Auto Eosinophil %: 3.9 % (08-14-23 @ 07:25)  Auto Eosinophil %: 3.4 % (08-13-23 @ 07:21)  Auto Eosinophil %: 2.7 % (08-12-23 @ 07:30)      Urinalysis Basic - ( 14 Aug 2023 07:21 )    Color: x / Appearance: x / SG: x / pH: x  Gluc: 106 mg/dL / Ketone: x  / Bili: x / Urobili: x   Blood: x / Protein: x / Nitrite: x   Leuk Esterase: x / RBC: x / WBC x   Sq Epi: x / Non Sq Epi: x / Bacteria: x        MICROBIOLOGY:    [facility] (08-11-23 @ 18:07)      Culture - Eye (collected 11 Aug 2023 02:22)  Source: .Eye Conjunctiva-Left  Preliminary Report:    Few Providencia rettgeri    Few Staphylococcus aureus    Moderate Corynebacterium amycolatum "Susceptibilities not performed"  Organism: Providencia rettgeri  Staphylococcus aureus  Organism: Providencia rettgeri    Sensitivities:      Method Type: AMADOR      -  Amikacin: S <=16      -  Amoxicillin/Clavulanic Acid: R 16/8      -  Ampicillin: R 16      -  Ampicillin/Sulbactam: S <=4/2      -  Aztreonam: S <=4      -  Cefazolin: R >16      -  Cefepime: S <=2      -  Cefoxitin: S <=8      -  Ceftriaxone: S <=1      -  Ciprofloxacin: S <=0.25      -  Ertapenem: S <=0.5      -  Gentamicin: S <=2      -  Imipenem: S <=1      -  Levofloxacin: S <=0.5      -  Meropenem: S <=1      -  Piperacillin/Tazobactam: S <=8      -  Tobramycin: S <=2      -  Trimethoprim/Sulfamethoxazole: S <=0.5/9.5  Organism: Staphylococcus aureus    Sensitivities:      Method Type: AMADOR      -  Ampicillin/Sulbactam: S <=8/4      -  Cefazolin: S <=4      -  Clindamycin: R <=0.25 This isolate is presumed to be clindamycin resistant based on detection of inducible resistance. Clindamycin may still be effective in some patients.      -  Erythromycin: R >4      -  Gentamicin: S <=1 Should not be used as monotherapy      -  Oxacillin: S <=0.25 Oxacillin predicts susceptibility for dicloxacillin, methicillin, and nafcillin      -  Penicillin: R >8      -  Rifampin: S <=1 Should not be used as monotherapy      -  Tetracycline: S <=1      -  Trimethoprim/Sulfamethoxazole: S <=0.5/9.5      -  Vancomycin: S 2                                                    RADIOLOGY:  imaging below personally reviewed   HPI adapted from H&P:  Patient is a 89F who presented to the hospital for left facial mass and is consulted for bacterial conjunctivitis that grew MSSA and Providencia Rettgeri. Pt has a hx of Basal cell Ca left cheek s/p mohs's ~4yr ago, DVT left leg on Coumadin, htn, hld, [place], macular degeneration.     Pt has growing facial mass on left forehead for 6 months. Have been in contact with doctor (Abdulaziz Pérez PCP) regarding mass, who have been doing watchful waiting of mass. Mass has now grown to point that it is pushing on eye, and recently in past few days hs been associated w/ pustular drainage. At home daughter has been cleaning mass w/ peroxide and applying OTC abx ointment. Pt now experiencing increased  pain/discomfort ( moderately relieved w/Tyelenol), vision obstruction, which prompted pt and family to come to ED for eval. Pt has had limited mobility since getting COVID 6 months ago, so daughter unable to take pt to appointment for eval.    Per chart the patient had mucopurulent drainage of the left eye in the ED along with surrounding edema. CT head shows the mass to by 7.5 x 2.4 x 7.0 cm. Opthalmology has been consulted and patient has been placed on ofloxacin drops and erythromycin ointment for the conjunctivitis along with oral bactrim for surrounding facial edema and erythema.     ROS: Denies CP, SOB, N/V/D, fever, cough, chills, dizziness, abm pain, recent travel, sick contact, change in bowel habits, dysuria    A 10-system ROS was performed and is negative except as noted above and/or in the HPI.    REVIEW OF SYSTEMS  Constitutional: No fevers, chills, weight loss or fatigue   Skin: No rash, forehead mass	  Eyes: L eye drainage	  ENMT: No sore throat, oral thrush, ulcers or exudate  Respiratory: No cough, no SOB  Cardiovascular:  No chest pain, palpitations or edema   Gastrointestinal: No pain, nausea, vomiting, diarrhea or constipation	  Genitourinary: No dysuria, discharge or flank pain  MSK: No arthralgias or back pain   Neurological: No HA, no weakness, no seizures, no AMS   psych: no anxiety      prior hospital charts reviewed [V]  primary team notes reviewed [V]  other consultant notes reviewed [V]    PAST MEDICAL & SURGICAL HISTORY:  HTN (hypertension)      DVT (deep venous thrombosis)  left leg      Macular degeneration      HLD (hyperlipidemia)      Breast CA      Basal cell carcinoma      S/P lumpectomy of breast      S/P cataract extraction      S/P tonsillectomy      S/P colon resection          SOCIAL HISTORY:  lives with her kelsey  Denied smoking/vaping/alcohol/recreational drug use  no recent travel    FAMILY HISTORY:  FH: breast cancer (Sibling)        Allergies  No Known Allergies        ANTIMICROBIALS:  trimethoprim   80 mG/sulfamethoxazole 400 mG 1 two times a day      ANTIMICROBIALS (past 90 days):  MEDICATIONS  (STANDING):  trimethoprim   80 mG/sulfamethoxazole 400 mG   1 Tablet(s) Oral (08-14-23 @ 05:20)   1 Tablet(s) Oral (08-13-23 @ 18:21)   1 Tablet(s) Oral (08-13-23 @ 05:38)   1 Tablet(s) Oral (08-12-23 @ 17:32)   1 Tablet(s) Oral (08-12-23 @ 05:07)   1 Tablet(s) Oral (08-11-23 @ 17:07)        OTHER MEDS:   MEDICATIONS  (STANDING):  acetaminophen     Tablet .. 650 every 6 hours PRN  aluminum hydroxide/magnesium hydroxide/simethicone Suspension 30 every 4 hours PRN  atenolol  Tablet 25 daily  enoxaparin Injectable 40 every 24 hours  melatonin 3 at bedtime PRN  ondansetron Injectable 4 every 8 hours PRN  senna 2 at bedtime      VITALS:  Vital Signs Last 24 Hrs  T(F): 97.7 (08-14-23 @ 13:00), Max: 98.8 (08-13-23 @ 20:41)    Vital Signs Last 24 Hrs  HR: 65 (08-14-23 @ 13:00) (61 - 67)  BP: 112/57 (08-14-23 @ 13:00) (112/57 - 135/61)  RR: 18 (08-14-23 @ 13:00)  SpO2: 97% (08-14-23 @ 13:00) (95% - 98%)  Wt(kg): --    EXAM:  General: Patient in no acute distress   Head:  large left fungating mass with purulent drainage, extending to nose and eye  Eyes: left eye compressed by mass with minimal injection, right eye normal appearance  ENT: no oral lesions  CV: S1+S2, no m/r/g appreciated   Lungs: No respiratory distress, CTAB  Abd: Soft, nontender, no guarding, no rebound tenderness, +bowel sounds   : no suprapubic or CVA tenderness  Ext: No cyanosis, no edema, mild tenderness on joints  Neuro: Alert and oriented, no focal deficits, CN II-XII grossly intact   Skin: No rash   vascular : No phlebitis  psych: normal affect    Labs:                        10.6   6.37  )-----------( 373      ( 14 Aug 2023 07:25 )             33.8     08-14    138  |  103  |  18  ----------------------------<  106<H>  4.5   |  23  |  1.19    Ca    9.6      14 Aug 2023 07:21  Phos  2.5     08-14  Mg     2.2     08-14    TPro  5.8<L>  /  Alb  2.9<L>  /  TBili  0.2  /  DBili  x   /  AST  21  /  ALT  17  /  AlkPhos  79  08-14      WBC Trend:  WBC Count: 6.37 (08-14-23 @ 07:25)  WBC Count: 6.45 (08-13-23 @ 07:21)  WBC Count: 7.64 (08-12-23 @ 07:30)  WBC Count: 10.48 (08-11-23 @ 06:28)      Auto Neutrophil #: 4.16 K/uL (08-14-23 @ 07:25)  Auto Neutrophil #: 4.47 K/uL (08-13-23 @ 07:21)  Auto Neutrophil #: 5.28 K/uL (08-12-23 @ 07:30)  Auto Neutrophil #: 6.31 K/uL (08-10-23 @ 15:59)      Creatine Trend:  Creatinine: 1.19 (08-14)  Creatinine: 1.18 (08-13)  Creatinine: 1.04 (08-12)  Creatinine: 0.86 (08-11)      Liver Biochemical Testing Trend:  Alanine Aminotransferase (ALT/SGPT): 17 (08-14)  Alanine Aminotransferase (ALT/SGPT): 17 (08-10)  Aspartate Aminotransferase (AST/SGOT): 21 (08-14-23 @ 07:21)  Aspartate Aminotransferase (AST/SGOT): 18 (08-10-23 @ 15:59)  Bilirubin Total: 0.2 (08-14)  Bilirubin Total: 0.1 (08-10)      Trend LDH      Auto Eosinophil %: 3.9 % (08-14-23 @ 07:25)  Auto Eosinophil %: 3.4 % (08-13-23 @ 07:21)  Auto Eosinophil %: 2.7 % (08-12-23 @ 07:30)      Urinalysis Basic - ( 14 Aug 2023 07:21 )    Color: x / Appearance: x / SG: x / pH: x  Gluc: 106 mg/dL / Ketone: x  / Bili: x / Urobili: x   Blood: x / Protein: x / Nitrite: x   Leuk Esterase: x / RBC: x / WBC x   Sq Epi: x / Non Sq Epi: x / Bacteria: x        MICROBIOLOGY:    MRSA PCR Result.: Christopher (08-11-23 @ 18:07)      Culture - Eye (collected 11 Aug 2023 02:22)  Source: .Eye Conjunctiva-Left  Preliminary Report:    Few Providencia rettgeri    Few Staphylococcus aureus    Moderate Corynebacterium amycolatum "Susceptibilities not performed"  Organism: Providencia rettgeri  Staphylococcus aureus  Organism: Providencia rettgeri    Sensitivities:      Method Type: AMADOR      -  Amikacin: S <=16      -  Amoxicillin/Clavulanic Acid: R 16/8      -  Ampicillin: R 16      -  Ampicillin/Sulbactam: S <=4/2      -  Aztreonam: S <=4      -  Cefazolin: R >16      -  Cefepime: S <=2      -  Cefoxitin: S <=8      -  Ceftriaxone: S <=1      -  Ciprofloxacin: S <=0.25      -  Ertapenem: S <=0.5      -  Gentamicin: S <=2      -  Imipenem: S <=1      -  Levofloxacin: S <=0.5      -  Meropenem: S <=1      -  Piperacillin/Tazobactam: S <=8      -  Tobramycin: S <=2      -  Trimethoprim/Sulfamethoxazole: S <=0.5/9.5  Organism: Staphylococcus aureus    Sensitivities:      Method Type: AMADOR      -  Ampicillin/Sulbactam: S <=8/4      -  Cefazolin: S <=4      -  Clindamycin: R <=0.25 This isolate is presumed to be clindamycin resistant based on detection of inducible resistance. Clindamycin may still be effective in some patients.      -  Erythromycin: R >4      -  Gentamicin: S <=1 Should not be used as monotherapy      -  Oxacillin: S <=0.25 Oxacillin predicts susceptibility for dicloxacillin, methicillin, and nafcillin      -  Penicillin: R >8      -  Rifampin: S <=1 Should not be used as monotherapy      -  Tetracycline: S <=1      -  Trimethoprim/Sulfamethoxazole: S <=0.5/9.5      -  Vancomycin: S 2                                                    RADIOLOGY:  imaging below personally reviewed  < from: [facility] Duplex Lower Ext Vein Scan, Bilat (08.13.23 @ 14:32) >  IMPRESSION:  No evidence of deep venous thrombosis in either lower extremity.    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 If clinically indicated further evaluation may   be performed with CTA and CT venogram of this region.    < end of copied text >  < from: CT Maxillofacial w/ IV Cont (08.10.23 @ 20:10) >  IMPRESSION:  Moderate periventricular white matter ischemia.    7.5 x 2.4   x 7.0 cm dermal mass in the LEFT forehead and periorbital region   suspicious for a neoplasm.    < end of copied text >

## 2023-08-14 NOTE — CONSULT NOTE ADULT - ASSESSMENT
Patient is a 89F who presented to the hospital for left facial mass and is consulted for bacterial conjunctivitis that grew MSSA and Providencia Rettgeri. How symptoms and presentation appear today is much improved compared to at admission. The draining left facial mass is likely a source of the conjunctivitis.     Recommendations:  -no changes for the conjunctivitis management. continue ofloxacin drops, erythromycin ointment and oral bactrim per primary and optho  -management of the mass per primary team. consider dermatology consult to assess ways to manage the mass whether it is surgical or non-surgical

## 2023-08-14 NOTE — CONSULT NOTE ADULT - ATTENDING COMMENTS
89 f with HTN, HLD, breast ca s/p lumpectomy, BCC s/p Mohs, developed L forehead fungating mass about 6 months ago which progressively got worse and then developed erythema around the eye and purulent drainage from L eye  afebrile, WBC normal   CT:  Moderate periventricular white matter ischemia.    7.5 x 2.4 x 7.0 cm dermal mass in the LEFT forehead and periorbital region suspicious for a neoplasm.  pt found to have purulent conjunctivitis and eye cx showed MSSA,  providenica and corynebacterium    fungating forehead mass extending to L eye now with purulent conjunctivitises, eye cx showed MSSA,  providenica and corynebacterium  the mass if foul smelling and has purulent drainage but s/o malignancy, no cellulitis now    * conjunctivitis treatment as per ophthalmology, c/w ofloxacin drop  * pt was give bactrim for ?cellulitis now day 4, no evidence of cellulitis now, pt needs surgical excision of the mass  * f/u with surgery and ophthalmology    The above assessment and plan was discussed with the primary team    Nazia Lopez MD  contact on teams  After 5pm and on weekends call [phone]

## 2023-08-14 NOTE — CONSULT NOTE ADULT - CONVERSATION DETAILS
The patient and her daughter gave verbalized understanding about her current illness (fungating mass with possible Basal Cell CA). At this point their hope is that something (some kind of procedure) may be done to remove(even partially) the mass, so she the patient is able to open her left eye and have a less burdensome lesion in her head. Also, her daughter expressed concerns about a possible infection of the mass and was not able to understand how her mother was going to come out of the hospital while having puss coming out of the lesion. At this point they would like inputs from someone (I am not sure if it will be Onc, Plastic Sx, or ENT), so they can understand any possible treatment options (if any).     Primary team to determine which speciality can better define a Dx and Rx options so GOC can be determined.

## 2023-08-14 NOTE — PROGRESS NOTE ADULT - SUBJECTIVE AND OBJECTIVE BOX
Patient is a 89F who presented to the hospital for left facial mass and is consulted for bacterial conjunctivitis that grew MSSA and Providencia Rettgeri. Pt has a hx of Basal cell Ca left cheek s/p mohs's ~4yr ago, DVT left leg on Coumadin, htn, hld, [place], macular degeneration.     Pt has growing facial mass on left forehead for 6 months. Have been in contact with doctor (Abdulaziz Pérez PCP) regarding mass, who have been doing watchful waiting of mass. Mass has now grown to point that it is pushing on eye, and recently in past few days hs been associated w/ pustular drainage. At home daughter has been cleaning mass w/ peroxide and applying OTC abx ointment. Pt now experiencing increased  pain/discomfort ( moderately relieved w/Tyelenol), vision obstruction, which prompted pt and family to come to ED for eval. Pt has had limited mobility since getting COVID 6 months ago, so daughter unable to take pt to appointment for eval.    ROS: Denies CP, SOB, N/V/D, fever, cough, chills, dizziness, abm pain, recent travel, sick contact, change in bowel habits, dysuria    A 10-system ROS was performed and is negative except as noted above and/or in the HPI.     (10 Aug 2023 22:38)       REVIEW OF SYSTEMS  Constitutional: No fevers, chills, weight loss or fatigue   Skin: No rash, no phlebitis	  Eyes: No discharge	  ENMT: No sore throat, oral thrush, ulcers or exudate  Respiratory: No cough, no SOB  Cardiovascular:  No chest pain, palpitations or edema   Gastrointestinal: No pain, nausea, vomiting, diarrhea or constipation	  Genitourinary: No dysuria, discharge or flank pain  MSK: No arthralgias or back pain   Neurological: No HA, no weakness, no seizures, no AMS       prior hospital charts reviewed [V]  primary team notes reviewed [V]  other consultant notes reviewed [V]    PAST MEDICAL & SURGICAL HISTORY:  HTN (hypertension)      DVT (deep venous thrombosis)  left leg      Macular degeneration      HLD (hyperlipidemia)      Breast CA      Basal cell carcinoma      S/P lumpectomy of breast      S/P cataract extraction      S/P tonsillectomy      S/P colon resection          SOCIAL HISTORY:  - Denied smoking/vaping/alcohol/recreational drug use    FAMILY HISTORY:  FH: breast cancer (Sibling)        Allergies  No Known Allergies        ANTIMICROBIALS:  trimethoprim   80 mG/sulfamethoxazole 400 mG 1 two times a day      ANTIMICROBIALS (past 90 days):  MEDICATIONS  (STANDING):  trimethoprim   80 mG/sulfamethoxazole 400 mG   1 Tablet(s) Oral (08-14-23 @ 05:20)   1 Tablet(s) Oral (08-13-23 @ 18:21)   1 Tablet(s) Oral (08-13-23 @ 05:38)   1 Tablet(s) Oral (08-12-23 @ 17:32)   1 Tablet(s) Oral (08-12-23 @ 05:07)   1 Tablet(s) Oral (08-11-23 @ 17:07)        OTHER MEDS:   MEDICATIONS  (STANDING):  acetaminophen     Tablet .. 650 every 6 hours PRN  aluminum hydroxide/magnesium hydroxide/simethicone Suspension 30 every 4 hours PRN  atenolol  Tablet 25 daily  enoxaparin Injectable 40 every 24 hours  melatonin 3 at bedtime PRN  ondansetron Injectable 4 every 8 hours PRN  senna 2 at bedtime      VITALS:  Vital Signs Last 24 Hrs  T(F): 97.7 (08-14-23 @ 13:00), Max: 98.8 (08-13-23 @ 20:41)    Vital Signs Last 24 Hrs  HR: 65 (08-14-23 @ 13:00) (61 - 67)  BP: 112/57 (08-14-23 @ 13:00) (112/57 - 135/61)  RR: 18 (08-14-23 @ 13:00)  SpO2: 97% (08-14-23 @ 13:00) (95% - 98%)  Wt(kg): --    EXAM:  General: Patient in no acute distress   HEENT: large left fungating mass with purulent drainage, left eye compressed by mass, right eye normal appearance  CV: S1+S2, no m/r/g appreciated   Lungs: No respiratory distress, CTAB  Abd: Soft, nontender, no guarding, no rebound tenderness, + bowel sounds   Ext: No cyanosis, no edema, mild tenderness on joints  Neuro: Alert and oriented, no focal deficits, CN II-XII grossly intact   Skin: No rash   IV: No phlebitis      Labs:                        10.6   6.37  )-----------( 373      ( 14 Aug 2023 07:25 )             33.8     08-14    138  |  103  |  18  ----------------------------<  106<H>  4.5   |  23  |  1.19    Ca    9.6      14 Aug 2023 07:21  Phos  2.5     08-14  Mg     2.2     08-14    TPro  5.8<L>  /  Alb  2.9<L>  /  TBili  0.2  /  DBili  x   /  AST  21  /  ALT  17  /  AlkPhos  79  08-14      WBC Trend:  WBC Count: 6.37 (08-14-23 @ 07:25)  WBC Count: 6.45 (08-13-23 @ 07:21)  WBC Count: 7.64 (08-12-23 @ 07:30)  WBC Count: 10.48 (08-11-23 @ 06:28)      Auto Neutrophil #: 4.16 K/uL (08-14-23 @ 07:25)  Auto Neutrophil #: 4.47 K/uL (08-13-23 @ 07:21)  Auto Neutrophil #: 5.28 K/uL (08-12-23 @ 07:30)  Auto Neutrophil #: 6.31 K/uL (08-10-23 @ 15:59)      Creatine Trend:  Creatinine: 1.19 (08-14)  Creatinine: 1.18 (08-13)  Creatinine: 1.04 (08-12)  Creatinine: 0.86 (08-11)      Liver Biochemical Testing Trend:  Alanine Aminotransferase (ALT/SGPT): 17 (08-14)  Alanine Aminotransferase (ALT/SGPT): 17 (08-10)  Aspartate Aminotransferase (AST/SGOT): 21 (08-14-23 @ 07:21)  Aspartate Aminotransferase (AST/SGOT): 18 (08-10-23 @ 15:59)  Bilirubin Total: 0.2 (08-14)  Bilirubin Total: 0.1 (08-10)      Trend LDH      Auto Eosinophil %: 3.9 % (08-14-23 @ 07:25)  Auto Eosinophil %: 3.4 % (08-13-23 @ 07:21)  Auto Eosinophil %: 2.7 % (08-12-23 @ 07:30)      Urinalysis Basic - ( 14 Aug 2023 07:21 )    Color: x / Appearance: x / SG: x / pH: x  Gluc: 106 mg/dL / Ketone: x  / Bili: x / Urobili: x   Blood: x / Protein: x / Nitrite: x   Leuk Esterase: x / RBC: x / WBC x   Sq Epi: x / Non Sq Epi: x / Bacteria: x        MICROBIOLOGY:    MRSA PCR Result.: Christopher (08-11-23 @ 18:07)      Culture - Eye (collected 11 Aug 2023 02:22)  Source: .Eye Conjunctiva-Left  Preliminary Report:    Few Providencia rettgeri    Few Staphylococcus aureus    Moderate Corynebacterium amycolatum "Susceptibilities not performed"  Organism: Providencia rettgeri  Staphylococcus aureus  Organism: Providencia rettgeri    Sensitivities:      Method Type: AMADOR      -  Amikacin: S <=16      -  Amoxicillin/Clavulanic Acid: R 16/8      -  Ampicillin: R 16      -  Ampicillin/Sulbactam: S <=4/2      -  Aztreonam: S <=4      -  Cefazolin: R >16      -  Cefepime: S <=2      -  Cefoxitin: S <=8      -  Ceftriaxone: S <=1      -  Ciprofloxacin: S <=0.25      -  Ertapenem: S <=0.5      -  Gentamicin: S <=2      -  Imipenem: S <=1      -  Levofloxacin: S <=0.5      -  Meropenem: S <=1      -  Piperacillin/Tazobactam: S <=8      -  Tobramycin: S <=2      -  Trimethoprim/Sulfamethoxazole: S <=0.5/9.5  Organism: Staphylococcus aureus    Sensitivities:      Method Type: AMADOR      -  Ampicillin/Sulbactam: S <=8/4      -  Cefazolin: S <=4      -  Clindamycin: R <=0.25 This isolate is presumed to be clindamycin resistant based on detection of inducible resistance. Clindamycin may still be effective in some patients.      -  Erythromycin: R >4      -  Gentamicin: S <=1 Should not be used as monotherapy      -  Oxacillin: S <=0.25 Oxacillin predicts susceptibility for dicloxacillin, methicillin, and nafcillin      -  Penicillin: R >8      -  Rifampin: S <=1 Should not be used as monotherapy      -  Tetracycline: S <=1      -  Trimethoprim/Sulfamethoxazole: S <=0.5/9.5      -  Vancomycin: S 2                                                    RADIOLOGY:  imaging below personally reviewed

## 2023-08-14 NOTE — PROGRESS NOTE ADULT - ASSESSMENT
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 7 cm x 2 cm x 7 cm, with associated periorbital edema. Mass was first noticed by family 7 months ago. Maxilofacial CT on 8/11 with no invasion of mass into the orbits at this time, but in setting of periorbital edema and conjunctival injection, conjunctival cultures obtained, and MRI ordered per ophthalmology recommendations in ED.

## 2023-08-14 NOTE — PROGRESS NOTE ADULT - PROBLEM SELECTOR PLAN 4
BP stable    Plan:   -C/w home atenolol 25 mg daily  -Continue to monitor BP BP stable    Plan:   -C/w home atenolol 25 mg daily  -ctm

## 2023-08-15 LAB
ANION GAP SERPL CALC-SCNC: 12 MMOL/L — SIGNIFICANT CHANGE UP (ref 5–17)
BUN SERPL-MCNC: 16 MG/DL — SIGNIFICANT CHANGE UP (ref 7–23)
CALCIUM SERPL-MCNC: 9.8 MG/DL — SIGNIFICANT CHANGE UP (ref 8.4–10.5)
CHLORIDE SERPL-SCNC: 104 MMOL/L — SIGNIFICANT CHANGE UP (ref 96–108)
CO2 SERPL-SCNC: 23 MMOL/L — SIGNIFICANT CHANGE UP (ref 22–31)
CREAT SERPL-MCNC: 1.22 MG/DL — SIGNIFICANT CHANGE UP (ref 0.5–1.3)
EGFR: 39 ML/MIN/1.73M2 — LOW
GLUCOSE SERPL-MCNC: 99 MG/DL — SIGNIFICANT CHANGE UP (ref 70–99)
HCT VFR BLD CALC: 36.1 % — SIGNIFICANT CHANGE UP (ref 34.5–45)
HGB BLD-MCNC: 10.9 G/DL — LOW (ref 11.5–15.5)
MAGNESIUM SERPL-MCNC: 2.3 MG/DL — SIGNIFICANT CHANGE UP (ref 1.6–2.6)
MCHC RBC-ENTMCNC: 28 PG — SIGNIFICANT CHANGE UP (ref 27–34)
MCHC RBC-ENTMCNC: 30.2 GM/DL — LOW (ref 32–36)
MCV RBC AUTO: 92.8 FL — SIGNIFICANT CHANGE UP (ref 80–100)
NRBC # BLD: 0 /100 WBCS — SIGNIFICANT CHANGE UP (ref 0–0)
PHOSPHATE SERPL-MCNC: 2.6 MG/DL — SIGNIFICANT CHANGE UP (ref 2.5–4.5)
PLATELET # BLD AUTO: 354 K/UL — SIGNIFICANT CHANGE UP (ref 150–400)
POTASSIUM SERPL-MCNC: 4.6 MMOL/L — SIGNIFICANT CHANGE UP (ref 3.5–5.3)
POTASSIUM SERPL-SCNC: 4.6 MMOL/L — SIGNIFICANT CHANGE UP (ref 3.5–5.3)
RBC # BLD: 3.89 M/UL — SIGNIFICANT CHANGE UP (ref 3.8–5.2)
RBC # FLD: 14.2 % — SIGNIFICANT CHANGE UP (ref 10.3–14.5)
SODIUM SERPL-SCNC: 139 MMOL/L — SIGNIFICANT CHANGE UP (ref 135–145)
WBC # BLD: 7.2 K/UL — SIGNIFICANT CHANGE UP (ref 3.8–10.5)
WBC # FLD AUTO: 7.2 K/UL — SIGNIFICANT CHANGE UP (ref 3.8–10.5)

## 2023-08-15 PROCEDURE — 99233 SBSQ HOSP IP/OBS HIGH 50: CPT | Mod: GC

## 2023-08-15 RX ADMIN — Medication 1 APPLICATION(S): at 21:30

## 2023-08-15 RX ADMIN — Medication 1 TABLET(S): at 18:05

## 2023-08-15 RX ADMIN — Medication 1 DROP(S): at 11:20

## 2023-08-15 RX ADMIN — LATANOPROST 1 DROP(S): 0.05 SOLUTION/ DROPS OPHTHALMIC; TOPICAL at 21:30

## 2023-08-15 RX ADMIN — SENNA PLUS 2 TABLET(S): 8.6 TABLET ORAL at 21:30

## 2023-08-15 RX ADMIN — Medication 650 MILLIGRAM(S): at 21:30

## 2023-08-15 RX ADMIN — Medication 1 DROP(S): at 18:05

## 2023-08-15 RX ADMIN — Medication 650 MILLIGRAM(S): at 22:30

## 2023-08-15 RX ADMIN — Medication 500 MILLIGRAM(S): at 11:21

## 2023-08-15 RX ADMIN — Medication 1 DROP(S): at 05:52

## 2023-08-15 RX ADMIN — Medication 1 TABLET(S): at 11:21

## 2023-08-15 RX ADMIN — Medication 1 TABLET(S): at 05:52

## 2023-08-15 RX ADMIN — Medication 1 DROP(S): at 11:22

## 2023-08-15 RX ADMIN — NYSTATIN CREAM 1 APPLICATION(S): 100000 CREAM TOPICAL at 05:52

## 2023-08-15 RX ADMIN — Medication 1000 UNIT(S): at 11:21

## 2023-08-15 RX ADMIN — CHLORHEXIDINE GLUCONATE 1 APPLICATION(S): 213 SOLUTION TOPICAL at 11:22

## 2023-08-15 RX ADMIN — ENOXAPARIN SODIUM 40 MILLIGRAM(S): 100 INJECTION SUBCUTANEOUS at 21:30

## 2023-08-15 RX ADMIN — ATENOLOL 25 MILLIGRAM(S): 25 TABLET ORAL at 05:52

## 2023-08-15 RX ADMIN — NYSTATIN CREAM 1 APPLICATION(S): 100000 CREAM TOPICAL at 18:05

## 2023-08-15 NOTE — PROGRESS NOTE ADULT - ASSESSMENT
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 7 cm x 2 cm x 7 cm, with associated periorbital edema. Mass was first noticed by family 7 months ago. Maxilofacial CT on 8/11 with no invasion of mass into the orbits at this time, but in setting of periorbital edema and conjunctival injection, conjunctival cultures obtained, and MRI ordered per ophthalmology recommendations in ED.

## 2023-08-15 NOTE — PHYSICAL THERAPY INITIAL EVALUATION ADULT - DIAGNOSIS, PT EVAL
Pt does not require skilled PT services at this time, however, pt would benefit from turning schedule to prevent skin breakdown while in bed.

## 2023-08-15 NOTE — PHYSICAL THERAPY INITIAL EVALUATION ADULT - ADDITIONAL COMMENTS
Daughter states that pt lives with her in a pvt home with two small steps to enter and lives on the first floor in the LR. Pt sleeps and stays in a recliner all day and night secondary to daughter is no longer able to transfer pt to w/c. Pt has minimal knee flexion (10 degrees) before she has pain, pt is a Max A x2.  Pt own RW, commoded, and w/c. Pt could benefit from hospital bed. Daughter is the sole caregiver. Pt can feed self with utensils, daughter prepares food and cuts it up for pt.

## 2023-08-15 NOTE — CONSULT NOTE ADULT - SUBJECTIVE AND OBJECTIVE BOX
Pt examined:.  Large fungating mass involving forehead/eyebrow and part of the left eye.  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  In my opinion not amenable to palliative care or limited excision due to risk of bleeding.    Discussed with daughter over the phone that the only 2 options would be resection under general anesthesia with possible reconstruction for wound coverage or no treatment and just observation.    If daughter agrees with treatment she would require a tissue biopsy for pathologic confirmation and an MRI (under sedation if necessary) to determine deep tissues involvement.

## 2023-08-15 NOTE — PROGRESS NOTE ADULT - CONVERSATION DETAILS
Spoke with patient's daughter Gabi Jackson regarding overall medical plan given conversation with plastic surgery regarding the risks of excision of the mass and need for generalized anesthesia. She expressed understanding that it is not feasible to remove the mass successfully under local anesthesia, nor would shaving off part of the mass be possible. She understands her mother's advanced age and does not have any intention to put her mother through chemotherapy, nor any major surgeries.  Her main concern was how she would be able to go home despite the purulence from mass dripping in her mother's eye causing an infection risk. I explained that the patient had been seen by both infectious disease and opthalmology and they recommended to just continue w/ otic and oral antibiotics, which she understood well. Ultimately she agreed for discharge with home care nursing to assist w/ wound care for the purulent mass and to assist with any necessary dressing needs.  If she changes her mind and would want to explore options for treatment of likely neoplasm, she will follow up w/ surgery as an outpatient to obtain a biopsy and MRI.     Plan:  [ ] Discharge home w/ home care once set up w/ CM.  [ ] Outpatient follow up w/ surgical oncology, opthalmology for further management  [ ] Considering palliative care follow up as an outpatient given that patient may benefit from hospice - discussed option with family who may be interested in this in the near future.    Discussed w/ attending Dr. Eduardo who is in agreement w/ plan.       Art Dunne, PGY-3  Internal Medicine  Can be reached via [employer] TEAMS.

## 2023-08-15 NOTE — CHART NOTE - NSCHARTNOTEFT_GEN_A_CORE
Spoke with patient's daughter Gabi Jackson regarding overall medical plan given conversation with plastic surgery regarding the risks of excision of the mass and need for generalized anesthesia. She expressed understanding that it is not feasible to remove the mass successfully under local anesthesia, nor would shaving off part of the mass be possible. She understands her mother's advanced age and does not have any intention to put her mother through chemotherapy, nor any major surgeries.  Her main concern was how she would be able to go home despite the purulence from mass dripping in her mother's eye causing an infection risk. I explained that the patient had been seen by both infectious disease and opthalmology and they recommended to just continue w/ otic and oral antibiotics, which she understood well. Ultimately she agreed for discharge with home care nursing to assist w/ wound care for the purulent mass and to assist with any necessary dressing needs.  If she changes her mind and would want to explore options for treatment of likely neoplasm, she will follow up w/ surgery as an outpatient to obtain a biopsy and MRI.     Plan:  [ ] Discharge home w/ home care once set up w/ CM.  [ ] Outpatient follow up w/ surgical oncology, opthalmology for further management  [ ] Considering palliative care follow up as an outpatient given that patient may benefit from hospice.     Art Dunne, PGY-3  Internal Medicine  Can be reached via [employer] TEAMS Spoke with patient's daughter Gabi Jackson regarding overall medical plan given conversation with plastic surgery regarding the risks of excision of the mass and need for generalized anesthesia. She expressed understanding that it is not feasible to remove the mass successfully under local anesthesia, nor would shaving off part of the mass be possible. She understands her mother's advanced age and does not have any intention to put her mother through chemotherapy, nor any major surgeries.  Her main concern was how she would be able to go home despite the purulence from mass dripping in her mother's eye causing an infection risk. I explained that the patient had been seen by both infectious disease and opthalmology and they recommended to just continue w/ otic and oral antibiotics, which she understood well. Ultimately she agreed for discharge with home care nursing to assist w/ wound care for the purulent mass and to assist with any necessary dressing needs.  If she changes her mind and would want to explore options for treatment of likely neoplasm, she will follow up w/ surgery as an outpatient to obtain a biopsy and MRI.     Plan:  [ ] Discharge home w/ home care once set up w/ CM.  [ ] Outpatient follow up w/ surgical oncology, opthalmology for further management  [ ] Considering palliative care follow up as an outpatient given that patient may benefit from hospice - discussed option with family who may be interested in this in the near future.    Discussed w/ attending Dr. Eduardo who is in agreement w/ plan.       Art Dunne, PGY-3  Internal Medicine  Can be reached via [employer] TEAMS

## 2023-08-15 NOTE — PROGRESS NOTE ADULT - PROBLEM SELECTOR PLAN 2
Left eye with copious purulent drainage, c/f bacterial conjunctivitis      Plan:  -conjunctival culture: Few Providencia rettgeri, Few Staphylococcus aureus, Moderate Corynebacterium amycolatum   -optho following:             -Ofloxacin QID OS             -Erythromycin ointment QHS OS             -Artificial tears QID OU  -Latanoprost OS for increased IOP  -ID c/s -> no change in medications Left eye with copious purulent drainage, c/f bacterial conjunctivitis      Plan:  -conjunctival culture: Few Providencia rettgeri, Few Staphylococcus aureus, Moderate Corynebacterium amycolatum   -optho following:             -Ofloxacin QID OS             -Erythromycin ointment QHS OS             -Artificial tears QID OU  -Latanoprost OS for increased IOP  -ID c/s: no change in medications

## 2023-08-15 NOTE — CONSULT NOTE ADULT - CONSULT REASON
wound
left eye discharge and periorbital mass
89F with h/o ca breast a/w big L sided forehead growth- suspected Squamous cell Ca,
Bacterial conjunctivitis
mass on forehead/face
Mass
heel DTIs

## 2023-08-15 NOTE — PROGRESS NOTE ADULT - PROBLEM SELECTOR PLAN 3
History of left leg DVT on Coumadin, unclear when diagnosed  Home regimen on Coumadin: 4 mg x 5 days weekly, 2 mg x 2 days weekly    Plan:  -per PCP, coumadin can be d/c  -DVT studies ordered, f/u results for persistence  -INR elevated, continue to trend

## 2023-08-15 NOTE — PHYSICAL THERAPY INITIAL EVALUATION ADULT - GENERAL OBSERVATIONS, REHAB EVAL
Pt received semi-supine in bed in NAD, +IV Lock, +external female catheter. Pt  AOx3-4, pleasant and cooperative, pt very hard of hearing. Daughter, Gabi, present.

## 2023-08-15 NOTE — PROGRESS NOTE ADULT - PROBLEM SELECTOR PLAN 1
CT Head and Maxillofacial 8/11/23: 7.5 x 2.4 x 7.0 cm dermal mass in the left forehead and periorbital region suspicious for a neoplasm    Plan:  -optho following:            -no MRI at this as pt is unable to tolerate it            -head and neck surgery c/s pending GOC with pall and daughter   -palliative care c/s: awaiting recs and GOC discussion before considering surgical/other interventions CT Head and Maxillofacial 8/11/23: 7.5 x 2.4 x 7.0 cm dermal mass in the left forehead and periorbital region suspicious for a neoplasm    Plan:  -optho following:            -no MRI at this as pt is unable to tolerate it  -gen surg and palliative care c/s: consider excision of mass but not under general anesthesia  -f/u with gen surg if plastics vs ENT would be c/s for this possible procedure

## 2023-08-15 NOTE — PROGRESS NOTE ADULT - SUBJECTIVE AND OBJECTIVE BOX
SURGERY PROGRESS NOTE    Interval events  - No acute events.   - Vital signs stable, afebrile, on room air.   - Patient reports that the mass "feels like a wet piece of paper stuck to her skin" that she could just "peel off."    Vital Signs Last 24 Hrs  T(C): 36.6 (15 Aug 2023 13:41), Max: 36.7 (14 Aug 2023 20:46)  T(F): 97.9 (15 Aug 2023 13:41), Max: 98.1 (14 Aug 2023 20:46)  HR: 65 (15 Aug 2023 13:41) (60 - 68)  BP: 111/56 (15 Aug 2023 13:41) (111/56 - 151/78)  BP(mean): --  RR: 18 (15 Aug 2023 13:41) (18 - 18)  SpO2: 98% (15 Aug 2023 13:41) (97% - 98%)    Parameters below as of 15 Aug 2023 13:41  Patient On (Oxygen Delivery Method): room air        OBJECTIVE: T(C): 36.6 (08-15-23 @ 13:41), Max: 36.7 (08-14-23 @ 20:46)  HR: 65 (08-15-23 @ 13:41) (60 - 68)  BP: 111/56 (08-15-23 @ 13:41) (111/56 - 151/78)  RR: 18 (08-15-23 @ 13:41) (18 - 18)  SpO2: 98% (08-15-23 @ 13:41) (97% - 98%)  Wt(kg): --  I&O's Summary    14 Aug 2023 07:01  -  15 Aug 2023 07:00  --------------------------------------------------------  IN: 240 mL / OUT: 350 mL / NET: -110 mL    15 Aug 2023 07:01  -  15 Aug 2023 16:57  --------------------------------------------------------  IN: 0 mL / OUT: 450 mL / NET: -450 mL      I&O's Detail    14 Aug 2023 07:01  -  15 Aug 2023 07:00  --------------------------------------------------------  IN:    Oral Fluid: 240 mL  Total IN: 240 mL    OUT:    Voided (mL): 350 mL  Total OUT: 350 mL    Total NET: -110 mL      15 Aug 2023 07:01  -  15 Aug 2023 16:57  --------------------------------------------------------  IN:  Total IN: 0 mL    OUT:    Voided (mL): 450 mL  Total OUT: 450 mL    Total NET: -450 mL          MEDICATIONS  (STANDING):  artificial tears (preservative free) Ophthalmic Solution 1 Drop(s) Both EYES four times a day  ascorbic acid 500 milliGRAM(s) Oral daily  atenolol  Tablet 25 milliGRAM(s) Oral daily  chlorhexidine 4% Liquid 1 Application(s) Topical daily  cholecalciferol 1000 Unit(s) Oral daily  enoxaparin Injectable 40 milliGRAM(s) SubCutaneous every 24 hours  erythromycin   Ointment 1 Application(s) Left EYE at bedtime  latanoprost 0.005% Ophthalmic Solution 1 Drop(s) Both EYES at bedtime  multivitamin 1 Tablet(s) Oral daily  nystatin Powder 1 Application(s) Topical two times a day  ofloxacin 0.3% Solution 1 Drop(s) Left EYE four times a day  senna 2 Tablet(s) Oral at bedtime  trimethoprim   80 mG/sulfamethoxazole 400 mG 1 Tablet(s) Oral two times a day    MEDICATIONS  (PRN):  acetaminophen     Tablet .. 650 milliGRAM(s) Oral every 6 hours PRN Temp greater or equal to 38C (100.4F), Mild Pain (1 - 3)  aluminum hydroxide/magnesium hydroxide/simethicone Suspension 30 milliLiter(s) Oral every 4 hours PRN Dyspepsia  melatonin 3 milliGRAM(s) Oral at bedtime PRN Insomnia  ondansetron Injectable 4 milliGRAM(s) IV Push every 8 hours PRN Nausea and/or Vomiting      LABS:                        10.9   7.20  )-----------( 354      ( 15 Aug 2023 07:04 )             36.1     08-15    139  |  104  |  16  ----------------------------<  99  4.6   |  23  |  1.22    Ca    9.8      15 Aug 2023 07:02  Phos  2.6     08-15  Mg     2.3     08-15    TPro  5.8<L>  /  Alb  2.9<L>  /  TBili  0.2  /  DBili  x   /  AST  21  /  ALT  17  /  AlkPhos  79  08-14      Urinalysis Basic - ( 15 Aug 2023 07:02 )    Color: x / Appearance: x / SG: x / pH: x  Gluc: 99 mg/dL / Ketone: x  / Bili: x / Urobili: x   Blood: x / Protein: x / Nitrite: x   Leuk Esterase: x / RBC: x / WBC x   Sq Epi: x / Non Sq Epi: x / Bacteria: x        RADIOLOGY & ADDITIONAL STUDIES:  no new    PHYSICAL EXAM  General: No acute distress, resting in bed  HEAD:  Atraumatic, Normocephalic. Exophytic mass over the left upper lid 7.5 x 7.0 cm in diameter, brown with stuck on appearance.        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 Pt has growing facial mass on left forehead  6 months. Her PCP was doing watchful waiting of mass. Mass has now grown to point that it is pushing on eye, and recently in past few days hs been associated w/ pustular drainage.  Patient now experiencing increased  pain/discomfort ( moderately relieved w/Tyelenol), vision obstruction, which prompted pt and family to come to ED for eval. Pt has had limited mobility since getting COVID 6 months ago. CT scan shows 7.5 x 2.4 x 7.0 cm dermal mass in the LEFT forehead and periorbital region suspicious for a neoplasm. Patient is interested in surgical excision that does not require general anesthesia. However, likely not a candidate for local anesthesia excision due to mass size, risk for bleeding, need for reconstruction.     - Recommend Plastics and Radiation Oncology consults.  - Surgery following while inpatient.  - Care per primary team.    Surgery Red Team  [id number]

## 2023-08-15 NOTE — PHYSICAL THERAPY INITIAL EVALUATION ADULT - NSPTDISCHREC_GEN_A_CORE
Pt would not benefit from skilled PT services, pt and caregiver would benefit from a home health aide/No skilled PT needs

## 2023-08-15 NOTE — PROGRESS NOTE ADULT - SUBJECTIVE AND OBJECTIVE BOX
Ricky Casiano| PGY-1  Internal Medicine    OVERNIGHT EVENTS: no overnight events      SUBJECTIVE: Patient was examined at bedside this morning. Appeared comfortable. Overnight vitals and monitoring results were reviewed. Reporting pain from the mass stable from yesterday. Continuing to drain puss and blood. Denied chest pain, SOB, abdominal pain, vomiting, diarrhea, constipation.       MEDICATIONS  (STANDING):  artificial tears (preservative free) Ophthalmic Solution 1 Drop(s) Both EYES four times a day  ascorbic acid 500 milliGRAM(s) Oral daily  atenolol  Tablet 25 milliGRAM(s) Oral daily  chlorhexidine 4% Liquid 1 Application(s) Topical daily  cholecalciferol 1000 Unit(s) Oral daily  enoxaparin Injectable 40 milliGRAM(s) SubCutaneous every 24 hours  erythromycin   Ointment 1 Application(s) Left EYE at bedtime  latanoprost 0.005% Ophthalmic Solution 1 Drop(s) Both EYES at bedtime  multivitamin 1 Tablet(s) Oral daily  nystatin Powder 1 Application(s) Topical two times a day  ofloxacin 0.3% Solution 1 Drop(s) Left EYE four times a day  senna 2 Tablet(s) Oral at bedtime  trimethoprim   80 mG/sulfamethoxazole 400 mG 1 Tablet(s) Oral two times a day    MEDICATIONS  (PRN):  acetaminophen     Tablet .. 650 milliGRAM(s) Oral every 6 hours PRN Temp greater or equal to 38C (100.4F), Mild Pain (1 - 3)  aluminum hydroxide/magnesium hydroxide/simethicone Suspension 30 milliLiter(s) Oral every 4 hours PRN Dyspepsia  melatonin 3 milliGRAM(s) Oral at bedtime PRN Insomnia  ondansetron Injectable 4 milliGRAM(s) IV Push every 8 hours PRN Nausea and/or Vomiting        T(F): 97.9 (08-15-23 @ 04:39), Max: 98.1 (08-14-23 @ 20:46)  HR: 60 (08-15-23 @ 04:39) (60 - 68)  BP: 141/59 (08-15-23 @ 04:39) (112/57 - 151/78)  BP(mean): --  RR: 18 (08-15-23 @ 04:39) (18 - 18)  SpO2: 98% (08-15-23 @ 04:39) (97% - 98%)    PHYSICAL EXAM:   GENERAL: NAD, lying in bed comfortably  HEAD:  Atraumatic, Normocephalic  EYES: EOMI, PERRLA, conjunctiva and sclera clear, no nystagmus noted  ENT: Moist mucous membranes   NECK: Trachea midline  CHEST/LUNG: Clear to auscultation bilaterally; No rales, rhonchi, wheezing, or rubs. Unlabored respirations  HEART: Regular rate and rhythm; No murmurs, rubs, or gallops, normal S1/S2  ABDOMEN: Soft, nontender, nondistended  EXTREMITIES:  No clubbing, cyanosis, or edema  MSK: No gross deformities noted   Neurological:  A&Ox3, no focal deficits   SKIN: Fungating left supraorbital mass draining blood and purulent discharge.  PSYCH: Normal mood, affect   TELEMETRY:    LABS:                        10.9   7.20  )-----------( 354      ( 15 Aug 2023 07:04 )             36.1     08-15    139  |  104  |  16  ----------------------------<  99  4.6   |  23  |  1.22    Ca    9.8      15 Aug 2023 07:02  Phos  2.6     08-15  Mg     2.3     08-15    TPro  5.8<L>  /  Alb  2.9<L>  /  TBili  0.2  /  DBili  x   /  AST  21  /  ALT  17  /  AlkPhos  79  08-14            Creatinine Trend: 1.22<--, 1.19<--, 1.18<--, 1.04<--, 0.86<--, 0.80<--  I&O's Summary    14 Aug 2023 07:01  -  15 Aug 2023 07:00  --------------------------------------------------------  IN: 240 mL / OUT: 350 mL / NET: -110 mL      BNP    RADIOLOGY & ADDITIONAL STUDIES:

## 2023-08-15 NOTE — PHYSICAL THERAPY INITIAL EVALUATION ADULT - PERTINENT HX OF CURRENT PROBLEM, REHAB EVAL
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 7 cm x 2 cm x 7 cm, with associated periorbital edema. Mass was first noticed by family 7 months ago. Maxilofacial CT on 8/11 with no invasion of mass into the orbits at this time, but in setting of periorbital edema and conjunctival injection, conjunctival cultures obtained, and MRI ordered per ophthalmology recommendations in ED.   Dx:  Mass of skin of head, CT Head and Maxillofacial 8/11/23: 7.5 x 2.4 x 7.0 cm dermal mass in the left forehead and periorbital region suspicious for a neoplasm, Bacterial conjunctivitis - Left eye with copious purulent drainage, c/f bacterial conjunctivitis, DVT, lower extremity, : HTN (hypertension), Macular degeneration.

## 2023-08-15 NOTE — CONSULT NOTE ADULT - CONSULT REQUESTED DATE/TIME
15-Aug-2023 14:42
14-Aug-2023 15:15
14-Aug-2023 15:12
10-Aug-2023 22:44
11-Aug-2023
11-Aug-2023 18:20
11-Aug-2023 11:02

## 2023-08-15 NOTE — PHYSICAL THERAPY INITIAL EVALUATION ADULT - ACTIVE RANGE OF MOTION EXAMINATION, REHAB EVAL
b/l lower extremities not within functional limits/janet. upper extremity Active ROM was WNL (within normal limits)/deficits as listed below

## 2023-08-16 ENCOUNTER — TRANSCRIPTION ENCOUNTER (OUTPATIENT)
Age: 88
End: 2023-08-16

## 2023-08-16 VITALS
DIASTOLIC BLOOD PRESSURE: 89 MMHG | TEMPERATURE: 98 F | RESPIRATION RATE: 20 BRPM | HEART RATE: 61 BPM | OXYGEN SATURATION: 98 % | SYSTOLIC BLOOD PRESSURE: 112 MMHG

## 2023-08-16 DIAGNOSIS — Z51.5 ENCOUNTER FOR PALLIATIVE CARE: ICD-10-CM

## 2023-08-16 DIAGNOSIS — Z71.89 OTHER SPECIFIED COUNSELING: ICD-10-CM

## 2023-08-16 LAB
ANION GAP SERPL CALC-SCNC: 13 MMOL/L — SIGNIFICANT CHANGE UP (ref 5–17)
BUN SERPL-MCNC: 17 MG/DL — SIGNIFICANT CHANGE UP (ref 7–23)
CALCIUM SERPL-MCNC: 10 MG/DL — SIGNIFICANT CHANGE UP (ref 8.4–10.5)
CHLORIDE SERPL-SCNC: 105 MMOL/L — SIGNIFICANT CHANGE UP (ref 96–108)
CO2 SERPL-SCNC: 17 MMOL/L — LOW (ref 22–31)
CREAT SERPL-MCNC: 1.24 MG/DL — SIGNIFICANT CHANGE UP (ref 0.5–1.3)
CULTURE RESULTS: SIGNIFICANT CHANGE UP
EGFR: 39 ML/MIN/1.73M2 — LOW
GLUCOSE SERPL-MCNC: 105 MG/DL — HIGH (ref 70–99)
HCT VFR BLD CALC: 36.8 % — SIGNIFICANT CHANGE UP (ref 34.5–45)
HGB BLD-MCNC: 11.1 G/DL — LOW (ref 11.5–15.5)
MAGNESIUM SERPL-MCNC: 2.3 MG/DL — SIGNIFICANT CHANGE UP (ref 1.6–2.6)
MCHC RBC-ENTMCNC: 28.1 PG — SIGNIFICANT CHANGE UP (ref 27–34)
MCHC RBC-ENTMCNC: 30.2 GM/DL — LOW (ref 32–36)
MCV RBC AUTO: 93.2 FL — SIGNIFICANT CHANGE UP (ref 80–100)
NRBC # BLD: 0 /100 WBCS — SIGNIFICANT CHANGE UP (ref 0–0)
ORGANISM # SPEC MICROSCOPIC CNT: SIGNIFICANT CHANGE UP
PHOSPHATE SERPL-MCNC: 3 MG/DL — SIGNIFICANT CHANGE UP (ref 2.5–4.5)
PLATELET # BLD AUTO: 368 K/UL — SIGNIFICANT CHANGE UP (ref 150–400)
POTASSIUM SERPL-MCNC: 4.9 MMOL/L — SIGNIFICANT CHANGE UP (ref 3.5–5.3)
POTASSIUM SERPL-SCNC: 4.9 MMOL/L — SIGNIFICANT CHANGE UP (ref 3.5–5.3)
RBC # BLD: 3.95 M/UL — SIGNIFICANT CHANGE UP (ref 3.8–5.2)
RBC # FLD: 14.3 % — SIGNIFICANT CHANGE UP (ref 10.3–14.5)
SODIUM SERPL-SCNC: 135 MMOL/L — SIGNIFICANT CHANGE UP (ref 135–145)
SPECIMEN SOURCE: SIGNIFICANT CHANGE UP
WBC # BLD: 6.71 K/UL — SIGNIFICANT CHANGE UP (ref 3.8–10.5)
WBC # FLD AUTO: 6.71 K/UL — SIGNIFICANT CHANGE UP (ref 3.8–10.5)

## 2023-08-16 PROCEDURE — 99239 HOSP IP/OBS DSCHRG MGMT >30: CPT

## 2023-08-16 PROCEDURE — 99233 SBSQ HOSP IP/OBS HIGH 50: CPT

## 2023-08-16 RX ORDER — ERYTHROMYCIN BASE 5 MG/GRAM
1 OINTMENT (GRAM) OPHTHALMIC (EYE)
Qty: 1 | Refills: 0
Start: 2023-08-16 | End: 2023-09-14

## 2023-08-16 RX ORDER — LATANOPROST 0.05 MG/ML
1 SOLUTION/ DROPS OPHTHALMIC; TOPICAL
Qty: 1 | Refills: 0
Start: 2023-08-16 | End: 2023-09-14

## 2023-08-16 RX ORDER — WARFARIN SODIUM 2.5 MG/1
1 TABLET ORAL
Qty: 0 | Refills: 0 | DISCHARGE

## 2023-08-16 RX ORDER — OFLOXACIN 0.3 %
1 DROPS OPHTHALMIC (EYE)
Qty: 1 | Refills: 0
Start: 2023-08-16 | End: 2023-09-14

## 2023-08-16 RX ORDER — ATENOLOL 25 MG/1
1 TABLET ORAL
Qty: 30 | Refills: 0
Start: 2023-08-16 | End: 2023-09-14

## 2023-08-16 RX ORDER — ATENOLOL 25 MG/1
1 TABLET ORAL
Qty: 0 | Refills: 0 | DISCHARGE

## 2023-08-16 RX ORDER — OFLOXACIN 0.3 %
1 DROPS OPHTHALMIC (EYE)
Qty: 0 | Refills: 0 | DISCHARGE
Start: 2023-08-16

## 2023-08-16 RX ORDER — AMLODIPINE BESYLATE 2.5 MG/1
1 TABLET ORAL
Qty: 0 | Refills: 0 | DISCHARGE

## 2023-08-16 RX ORDER — ERYTHROMYCIN BASE 5 MG/GRAM
1 OINTMENT (GRAM) OPHTHALMIC (EYE)
Qty: 0 | Refills: 0 | DISCHARGE
Start: 2023-08-16

## 2023-08-16 RX ORDER — LUTEIN 20 MG
1 CAPSULE ORAL
Qty: 0 | Refills: 0 | DISCHARGE

## 2023-08-16 RX ORDER — LATANOPROST 0.05 MG/ML
1 SOLUTION/ DROPS OPHTHALMIC; TOPICAL
Qty: 0 | Refills: 0 | DISCHARGE
Start: 2023-08-16

## 2023-08-16 RX ORDER — AMLODIPINE BESYLATE 2.5 MG/1
1 TABLET ORAL
Qty: 30 | Refills: 0
Start: 2023-08-16 | End: 2023-09-14

## 2023-08-16 RX ADMIN — Medication 1 DROP(S): at 00:20

## 2023-08-16 RX ADMIN — Medication 1 DROP(S): at 06:12

## 2023-08-16 RX ADMIN — ATENOLOL 25 MILLIGRAM(S): 25 TABLET ORAL at 06:12

## 2023-08-16 RX ADMIN — Medication 1000 UNIT(S): at 11:25

## 2023-08-16 RX ADMIN — Medication 650 MILLIGRAM(S): at 07:04

## 2023-08-16 RX ADMIN — Medication 1 DROP(S): at 11:25

## 2023-08-16 RX ADMIN — Medication 1 TABLET(S): at 06:12

## 2023-08-16 RX ADMIN — Medication 650 MILLIGRAM(S): at 06:13

## 2023-08-16 RX ADMIN — Medication 1 DROP(S): at 00:19

## 2023-08-16 RX ADMIN — Medication 500 MILLIGRAM(S): at 11:25

## 2023-08-16 RX ADMIN — CHLORHEXIDINE GLUCONATE 1 APPLICATION(S): 213 SOLUTION TOPICAL at 11:26

## 2023-08-16 RX ADMIN — Medication 1 TABLET(S): at 11:26

## 2023-08-16 RX ADMIN — Medication 1 DROP(S): at 11:26

## 2023-08-16 RX ADMIN — NYSTATIN CREAM 1 APPLICATION(S): 100000 CREAM TOPICAL at 06:13

## 2023-08-16 NOTE — PROGRESS NOTE ADULT - ASSESSMENT
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 7 cm x 2 cm x 7 cm, with associated periorbital edema. Mass was first noticed by family 7 months ago. Maxilofacial CT on 8/11 with no invasion of mass into the orbits at this time, but in setting of periorbital edema and conjunctival injection, conjunctival cultures obtained, and MRI ordered per ophthalmology recommendations in ED.

## 2023-08-16 NOTE — DISCHARGE NOTE NURSING/CASE MANAGEMENT/SOCIAL WORK - NSDCVIVACCINE_GEN_ALL_CORE_FT
Tdap; 01-Apr-2021 15:53; Patricia Fishman (RN); [employer]; [id number] (Exp. Date: 10-Esteban-2022); IntraMuscular; Deltoid Left.; 0.5 milliLiter(s); VIS (VIS Published: 09-May-2013, VIS Presented: 01-Apr-2021);

## 2023-08-16 NOTE — PROGRESS NOTE ADULT - ATTENDING COMMENTS
89y F pmh Basal cell Ca left cheek s/p mohs's ~4yr ago, DVT left leg on Coumadin, htn, hld, [place], macular degeneration for c/o left forehead mass c/f neoplasm c/b conjunctivitis.    Afebrile, the growth seems appeared for more than 7 months, worsening, fungating on exam     1. Left forehead dermal mass, possible neoplasm   2. L eye conjunctivitis  3. DVT left leg on Coumadin  4. h/o Ca breast    - CT head and maxillofacial- moderate periventricular white matter ischemia. 7.5 x 2.4 x 7.0 cm dermal mass in the LEFT forehead and periorbital region suspicious for a neoplasm.  - head & Neck Sx consult called per Optho rec, possible bx if in line with GOC  - pending MRO-pt unable to tolerate  - started on ofloxacin eye gtt for conjunctivitis, erythromycin ointment, OS cx grew staph aureus, providencia rettgeri, and corynebacterium   - wound care consult, added bactrim for possible secondary bacterial infection  - Palliative care consult requested for GOC, full code for now   - will need to obtain collateral as to why patient still on A/C and if still needed then can alternative agent other than coumadin be considered, dopplers neg for clot     d/w HS 4 .
89y F pmh Basal cell Ca left cheek s/p mohs's ~4yr ago, hx DVT left leg 6 yrs ago on Coumadin, htn, hld, [place], macular degeneration c/o left forehead mass c/f neoplasm c/b conjunctivitis.    Afebrile, the growth seems appeared for more than 7 months, worsening, fungating on exam with active purulent drainage and blood. Pt more confused today    1. Left forehead dermal mass, possible neoplasm   2. L eye conjunctivitis  3. DVT left leg on Coumadin  4. h/o Ca breast    - CT head and maxillofacial- moderate periventricular white matter ischemia. 7.5 x 2.4 x 7.0 cm dermal mass in the LEFT forehead and periorbital region suspicious for a neoplasm.  - Surg Onc consult called per Optho rec, possible bx if in line with GOC as outpatient  - Plastics consulted, given that family would like excision of mass without general anesthesia, doing reconstruction without sedation is not possible  - Team discussed with patient's daughter, opting for outpatient follow up, contemplating hospice  - MRI cancelled d/t pt being unable to tolerate   - c/w ofloxacin eye gtt for conjunctivitis, erythromycin ointment, OS cx grew staph aureus, providencia rettgeri, and corynebacterium, ID consulted appreciate eval, no evidence of cellulitis  PT eval appreciated, plan for dc home with services tomorrow    d/w HS 4 .
89y F pmh Basal cell Ca left cheek s/p mohs's ~4yr ago, hx DVT left leg 6 yrs ago on Coumadin, htn, hld, [place], macular degeneration c/o left forehead mass c/f neoplasm c/b conjunctivitis.    1. Left forehead dermal mass, possible neoplasm   2. L eye conjunctivitis  3. DVT left leg on Coumadin  4. h/o Ca breast    - CT head and maxillofacial- moderate periventricular white matter ischemia. 7.5 x 2.4 x 7.0 cm dermal mass in the LEFT forehead and periorbital region suspicious for a neoplasm.  - discussed with surg onc, plan for outpt biopsy if within goals of care  - Plastics consulted, given that family would like excision of mass without general anesthesia, doing reconstruction without sedation is not possible  - Team discussed with patient's daughter, opting for outpatient follow up, contemplating hospice  - MRI cancelled d/t pt being unable to tolerate   - c/w ofloxacin eye gtt for conjunctivitis, erythromycin ointment, OS cx grew staph aureus, providencia rettgeri, and corynebacterium, ID consulted appreciate eval, c/w PO abx  PT eval appreciated, plan for dc home with services today    d/w HS 4 .
89y F pmh Basal cell Ca left cheek s/p mohs's ~4yr ago, DVT left leg on Coumadin, htn, hld, [place], macular degeneration for c/o left forehead mass c/f neoplasm c/b conjunctivitis.    Afebrile, the growth seems appeared for more than 7 months, worsening, fungating on exam     1. Left forehead dermal mass, possible neoplasm   2. L eye conjunctivitis  3. DVT left leg on Coumadin  4. h/o Ca breast    - CT head and maxillofacial- moderate periventricular white matter ischemia. 7.5 x 2.4 x 7.0 cm dermal mass in the LEFT forehead and periorbital region suspicious for a neoplasm.  - head & Neck Sx consult called per Optho rec, possible bx if in line with GOC  - pending MRO  - started on ofloxacin eye gtt for conjunctivitis, erythromycin ointment, OS cx grew staph aureus, providencia rettgeri  - wound care consult, added bactrim for possible secondary bacterial infection  - Palliative care consult requested for GOC, possible home hospice .  - will need to obtain collateral as to why patient still on A/C and if still needed then can alternative agent other than coumadin be considered, f/u dopplers     d/w HS 4
89y F pmh Basal cell Ca left cheek s/p mohs's ~4yr ago, DVT left leg on Coumadin, htn, hld, [place], macular degeneration for c/o left forehead mass c/f neoplasm c/b conjunctivitis.    Afebrile, the growth seems appeared for more than 7 months, worsening, fungating on exam with active purulent drainage and blood    1. Left forehead dermal mass, possible neoplasm   2. L eye conjunctivitis  3. DVT left leg on Coumadin  4. h/o Ca breast    - CT head and maxillofacial- moderate periventricular white matter ischemia. 7.5 x 2.4 x 7.0 cm dermal mass in the LEFT forehead and periorbital region suspicious for a neoplasm.  - head & Neck Sx consult called per Optho rec, possible bx if in line with GOC  - pending MRO-pt unable to tolerate  - c/w ofloxacin eye gtt for conjunctivitis, erythromycin ointment, OS cx grew staph aureus, providencia rettgeri, and corynebacterium, ID consulted   - wound care consult, added bactrim for possible secondary bacterial infection  - Palliative care consult requested for GOC    d/w HS 4 .
The patient is a 89y F pmh Basal cell Ca left cheek s/p mohs's ~4yr ago, DVT left leg on Coumadin, htn, hld, [place], macular degeneration for c/o left forehead mass c/f neoplasm c/b conjunctivitis.    Afebrile, the growth seems appeared for more than 7 months, worsening    1. Left forehead dermal mass, possible neoplasm   2. L eye conjunctivitis  3. DVT left leg on Coumadin  4. h/o Ca breast    - Afebrile, previewed labs, imaging- CT head and maxillofacial- moderate periventricular white matter ischemia. 7.5 x 2.4 x 7.0 cm dermal mass in the LEFT forehead and periorbital region suspicious for a neoplasm.  - head & Neck Sx consult called per Optho rec  - started on ofloxacin eye gtt for conjunctivitis  - wound care consult, added bactrim for possible secondary bacterial infection  - Palliative care consult requested for GOC, possible home hospice

## 2023-08-16 NOTE — PROGRESS NOTE ADULT - ASSESSMENT
89y F with h/o Basal cell Ca over her left cheek s/p Mohs ~4yr ago, DVT on Coumadin, htn, HLD, [place], macular degeneration p/w a large left frontal fungating mass. Geriatrics and Palliative Medicine (GaP) Team was called for goals of care (GOC) and advanced care planning (ACP).

## 2023-08-16 NOTE — PROGRESS NOTE ADULT - REASON FOR ADMISSION
facial mass

## 2023-08-16 NOTE — DISCHARGE NOTE NURSING/CASE MANAGEMENT/SOCIAL WORK - NSDCFUADDAPPT_GEN_ALL_CORE_FT
APPTS ARE READY TO BE MADE: [X] YES    Best Family or Patient Contact (if needed): Daughter Alexandru): [phone]    Additional Information about above appointments (if needed):    1: Please follow-up with your primary care physician in 1-2 weeks for a post-hospitalization visit.

## 2023-08-16 NOTE — PROGRESS NOTE ADULT - TIME BILLING
Total time spent including the following  [x] Physical chart review and documentation   An extensive review of the physical chart, electronic health record, and documentation was conducted to obtain collateral information including but not limited to:   - Current inpatient records (primary team, and consultants [surgery, plastic Sx )   - Inpatient values/results (CBC, CMP)   - Social work assessments   - Current or proposed treatment plans   - Pharmacotherapy review   [x]care coordination  [x]discussion with the patient and with her HCP  [x]Physical Exam and/or review of systems   [x]Formulation of assessment and plan

## 2023-08-16 NOTE — PROGRESS NOTE ADULT - PROBLEM SELECTOR PROBLEM 1
Mass of skin of head

## 2023-08-16 NOTE — DISCHARGE NOTE NURSING/CASE MANAGEMENT/SOCIAL WORK - NSDCCRNAME_GEN_ALL_CORE_FT
As discussed please refer to Licensed Agency list provided to hire HHAs privately Out of pocket as needed to assist with Activities of daily living

## 2023-08-16 NOTE — PROGRESS NOTE ADULT - PROBLEM SELECTOR PROBLEM 3
DVT, lower extremity
Palliative care encounter

## 2023-08-16 NOTE — PROGRESS NOTE ADULT - SUBJECTIVE AND OBJECTIVE BOX
Ricky Shellkendall| PGY-1  Internal Medicine    OVERNIGHT EVENTS: no overnight events      SUBJECTIVE: Patient was examined at bedside this morning. Appeared comfortable. Overnight vitals and monitoring results were reviewed. Reporting no complaints. Denied chest pain, SOB, abdominal pain, vomiting, diarrhea, constipation.       MEDICATIONS  (STANDING):  artificial tears (preservative free) Ophthalmic Solution 1 Drop(s) Both EYES four times a day  ascorbic acid 500 milliGRAM(s) Oral daily  atenolol  Tablet 25 milliGRAM(s) Oral daily  chlorhexidine 4% Liquid 1 Application(s) Topical daily  cholecalciferol 1000 Unit(s) Oral daily  enoxaparin Injectable 40 milliGRAM(s) SubCutaneous every 24 hours  erythromycin   Ointment 1 Application(s) Left EYE at bedtime  latanoprost 0.005% Ophthalmic Solution 1 Drop(s) Both EYES at bedtime  multivitamin 1 Tablet(s) Oral daily  nystatin Powder 1 Application(s) Topical two times a day  ofloxacin 0.3% Solution 1 Drop(s) Left EYE four times a day  senna 2 Tablet(s) Oral at bedtime  trimethoprim   80 mG/sulfamethoxazole 400 mG 1 Tablet(s) Oral two times a day    MEDICATIONS  (PRN):  acetaminophen     Tablet .. 650 milliGRAM(s) Oral every 6 hours PRN Temp greater or equal to 38C (100.4F), Mild Pain (1 - 3)  aluminum hydroxide/magnesium hydroxide/simethicone Suspension 30 milliLiter(s) Oral every 4 hours PRN Dyspepsia  melatonin 3 milliGRAM(s) Oral at bedtime PRN Insomnia  ondansetron Injectable 4 milliGRAM(s) IV Push every 8 hours PRN Nausea and/or Vomiting        T(F): 97.5 (08-16-23 @ 04:46), Max: 98.6 (08-15-23 @ 21:23)  HR: 65 (08-16-23 @ 04:46) (65 - 72)  BP: 131/65 (08-16-23 @ 04:46) (111/56 - 138/71)  BP(mean): --  RR: 20 (08-16-23 @ 04:46) (18 - 20)  SpO2: 98% (08-16-23 @ 04:46) (97% - 98%)    PHYSICAL EXAM:     GENERAL: NAD, lying in bed comfortably  HEAD:  Atraumatic, Normocephalic  EYES: EOMI, PERRLA  ENT: Moist mucous membranes   NECK: Trachea midline  CHEST/LUNG: Clear to auscultation bilaterally; No rales, rhonchi, wheezing, or rubs. Unlabored respirations  HEART: Regular rate and rhythm; No murmurs, rubs, or gallops, normal S1/S2  ABDOMEN: Soft, nontender, nondistended  EXTREMITIES:  No clubbing, cyanosis, or edema  MSK: No gross deformities noted   Neurological:  A&Ox3, no focal deficits   SKIN: Fungating left supraorbital mass draining blood and purulent discharge.  PSYCH: Normal mood, affect     TELEMETRY:    LABS:                        11.1   6.71  )-----------( 368      ( 16 Aug 2023 06:30 )             36.8     08-16    135  |  105  |  17  ----------------------------<  105<H>  4.9   |  17<L>  |  1.24    Ca    10.0      16 Aug 2023 06:31  Phos  3.0     08-16  Mg     2.3     08-16              Creatinine Trend: 1.24<--, 1.22<--, 1.19<--, 1.18<--, 1.04<--, 0.86<--  I&O's Summary    15 Aug 2023 07:01  -  16 Aug 2023 07:00  --------------------------------------------------------  IN: 240 mL / OUT: 700 mL / NET: -460 mL      BNP    RADIOLOGY & ADDITIONAL STUDIES:

## 2023-08-16 NOTE — PROGRESS NOTE ADULT - SUBJECTIVE AND OBJECTIVE BOX
SURGERY PROGRESS NOTE    Interval events  - Plastics Consult > recommendation for resection under anesthesia with possible reconstruction versus just observation. Local excision complicated by bleeding risk.  - No acute events overnight.  - Vital signs stable, afebrile, on room air.  - Discharge planning in progress.      Vital Signs Last 24 Hrs  T(C): 36.4 (16 Aug 2023 04:46), Max: 37 (15 Aug 2023 21:23)  T(F): 97.5 (16 Aug 2023 04:46), Max: 98.6 (15 Aug 2023 21:23)  HR: 65 (16 Aug 2023 04:46) (65 - 72)  BP: 131/65 (16 Aug 2023 04:46) (111/56 - 138/71)  BP(mean): --  RR: 20 (16 Aug 2023 04:46) (18 - 20)  SpO2: 98% (16 Aug 2023 04:46) (97% - 98%)    Parameters below as of 16 Aug 2023 04:46  Patient On (Oxygen Delivery Method): room air        OBJECTIVE: T(C): 36.4 (08-16-23 @ 04:46), Max: 37 (08-15-23 @ 21:23)  HR: 65 (08-16-23 @ 04:46) (65 - 72)  BP: 131/65 (08-16-23 @ 04:46) (111/56 - 138/71)  RR: 20 (08-16-23 @ 04:46) (18 - 20)  SpO2: 98% (08-16-23 @ 04:46) (97% - 98%)  Wt(kg): --  I&O's Summary    15 Aug 2023 07:01  -  16 Aug 2023 07:00  --------------------------------------------------------  IN: 240 mL / OUT: 700 mL / NET: -460 mL      I&O's Detail    15 Aug 2023 07:01  -  16 Aug 2023 07:00  --------------------------------------------------------  IN:    Oral Fluid: 240 mL  Total IN: 240 mL    OUT:    Voided (mL): 700 mL  Total OUT: 700 mL    Total NET: -460 mL          MEDICATIONS  (STANDING):  artificial tears (preservative free) Ophthalmic Solution 1 Drop(s) Both EYES four times a day  ascorbic acid 500 milliGRAM(s) Oral daily  atenolol  Tablet 25 milliGRAM(s) Oral daily  chlorhexidine 4% Liquid 1 Application(s) Topical daily  cholecalciferol 1000 Unit(s) Oral daily  enoxaparin Injectable 40 milliGRAM(s) SubCutaneous every 24 hours  erythromycin   Ointment 1 Application(s) Left EYE at bedtime  latanoprost 0.005% Ophthalmic Solution 1 Drop(s) Both EYES at bedtime  multivitamin 1 Tablet(s) Oral daily  nystatin Powder 1 Application(s) Topical two times a day  ofloxacin 0.3% Solution 1 Drop(s) Left EYE four times a day  senna 2 Tablet(s) Oral at bedtime  trimethoprim   80 mG/sulfamethoxazole 400 mG 1 Tablet(s) Oral two times a day    MEDICATIONS  (PRN):  acetaminophen     Tablet .. 650 milliGRAM(s) Oral every 6 hours PRN Temp greater or equal to 38C (100.4F), Mild Pain (1 - 3)  aluminum hydroxide/magnesium hydroxide/simethicone Suspension 30 milliLiter(s) Oral every 4 hours PRN Dyspepsia  melatonin 3 milliGRAM(s) Oral at bedtime PRN Insomnia  ondansetron Injectable 4 milliGRAM(s) IV Push every 8 hours PRN Nausea and/or Vomiting      LABS:                        11.1   6.71  )-----------( 368      ( 16 Aug 2023 06:30 )             36.8     08-16    135  |  105  |  17  ----------------------------<  105<H>  4.9   |  17<L>  |  1.24    Ca    10.0      16 Aug 2023 06:31  Phos  3.0     08-16  Mg     2.3     08-16        Urinalysis Basic - ( 16 Aug 2023 06:31 )    Color: x / Appearance: x / SG: x / pH: x  Gluc: 105 mg/dL / Ketone: x  / Bili: x / Urobili: x   Blood: x / Protein: x / Nitrite: x   Leuk Esterase: x / RBC: x / WBC x   Sq Epi: x / Non Sq Epi: x / Bacteria: x        RADIOLOGY & ADDITIONAL STUDIES:    PHYSICAL EXAM  General: No acute distress, resting in bed  HEAD:  Atraumatic, Normocephalic. Exophytic mass over the left upper lid 7.5 x 7.0 cm in diameter, brown with stuck on appearance.        Assessment/Plan/Recs:  89y F pmh Basal cell Ca left cheek s/p mohs's ~4yr ago, DVT left leg on Coumadin, htn, hld, [place], macular degeneration p/w left facial mass. Pt has growing facial mass on left forehead  6 months. Her PCP was doing watchful waiting of mass. Mass has now grown to point that it is pushing on eye, and recently in past few days hs been associated w/ pustular drainage.  Patient now experiencing increased  pain/discomfort ( moderately relieved w/Tylenol), vision obstruction, which prompted pt and family to come to ED for eval. Pt has had limited mobility since getting COVID 6 months ago. CT scan shows 7.5 x 2.4 x 7.0 cm dermal mass in the LEFT forehead and periorbital region suspicious for a neoplasm.     Patient is interested in surgical excision that does not require general anesthesia. However, likely not a candidate for local anesthesia excision due to mass size, risk for bleeding, need for reconstruction.     - Recommend Radiation Oncology consult.  - Surgery following while inpatient.  - Care per primary team.    Surgery Red Team  [id number]

## 2023-08-16 NOTE — DISCHARGE NOTE NURSING/CASE MANAGEMENT/SOCIAL WORK - PATIENT PORTAL LINK FT
You can access the FollowMyHealth Patient Portal offered by [facility] by registering at the following website: [email or web address]. By joining [facility]’s FollowMyHealth portal, you will also be able to view your health information using other applications (apps) compatible with our system.

## 2023-08-16 NOTE — PROGRESS NOTE ADULT - PROBLEM SELECTOR PLAN 1
See GOC above.   Wound care.   Patient denied pain; however, if pain appears, and her HCP agrees, may consider Morphine 3-6mg PO q 4hrs PRN moderate to severe pain.   If prescribing Morphine, consider bowel regimen.

## 2023-08-16 NOTE — PROGRESS NOTE ADULT - PROBLEM SELECTOR PROBLEM 2
Bacterial conjunctivitis
Goals of care, counseling/discussion

## 2023-08-16 NOTE — PROGRESS NOTE ADULT - PROBLEM SELECTOR PLAN 2
Left eye with copious purulent drainage, c/f bacterial conjunctivitis      Plan:  -conjunctival culture: Few Providencia rettgeri, Few Staphylococcus aureus, Moderate Corynebacterium amycolatum   -optho following:             -Ofloxacin QID OS             -Erythromycin ointment QHS OS             -Artificial tears QID OU  -Latanoprost OS for increased IOP  -ID c/s: no change in medications

## 2023-08-16 NOTE — PROGRESS NOTE ADULT - SUBJECTIVE AND OBJECTIVE BOX
Date of Service:08-16-23 @ 12:00  Subjective and Objective: The patient was seen and examined. She denied any pain or any other complaints. She knew that a local Sx was not an option; however, when asked about more details regarding her illness she wanted me to talk to her daughter so she was able to discuss with me about her care as well as making decisions for her.   Reason for f/u:  Geriatrics and Palliative Medicine (GaP) Team was called for goals of care and advanced care planning.   Overnight events: No new events.       PERTINENT PM/SXH:   HTN (hypertension)    DVT (deep venous thrombosis)    Macular degeneration    HLD (hyperlipidemia)    Breast CA    Basal cell carcinoma      S/P lumpectomy of breast    S/P cataract extraction    S/P tonsillectomy    S/P colon resection      FAMILY HISTORY:  FH: breast cancer (Sibling)      Family Hx substance abuse [ ]yes [ ]no  ITEMS NOT CHECKED ARE NOT PRESENT    SOCIAL HISTORY:   Significant other/partner[ ]  Children[ ]  Shinto/Spirituality:  Substance hx:  [ ]   Tobacco hx:  [ ]   Alcohol hx: [ ]   Home Opioid hx:  [ ] I-Stop Reference No:  Living Situation: [ ]Home  [ ]Long term care  [ ]Rehab [ ]Other  As per care coordination notes: Prior  to admission, pt resided at home with her daughter (2 steps to enter) and was  able to perform minimal adl's independently but pt's daughter assists with a  majority of adl's. Pt is mostly confined to her motorized recliner chair as per  pt's daughter for the past 6 months. Pt unable to ambulate. Pt has a walker,  wheelchair and commode at home but has not used recently. Pt on coumadin at  home and has her INR checked at home QFriday as arranged by pt's PCP Dr. Abdulaziz Pérez.  ADVANCE DIRECTIVES:    DNR/MOLST  [ ]  Living Will  [ ]   DECISION MAKER(s):  [ x] Health Care Proxy(s)  [ ] Surrogate(s)  [ ] Guardian           Name(s): Phone Number(s): DaughterLizzie Jackson,[phone]    BASELINE (I)ADL(s) (prior to admission):  [place]: [ ]Total  [x ] Moderate [ ]Dependent    Allergies    No Known Allergies    Intolerances    MEDICATIONS  (STANDING):  artificial tears (preservative free) Ophthalmic Solution 1 Drop(s) Both EYES four times a day  ascorbic acid 500 milliGRAM(s) Oral daily  atenolol  Tablet 25 milliGRAM(s) Oral daily  chlorhexidine 4% Liquid 1 Application(s) Topical daily  cholecalciferol 1000 Unit(s) Oral daily  enoxaparin Injectable 40 milliGRAM(s) SubCutaneous every 24 hours  erythromycin   Ointment 1 Application(s) Left EYE at bedtime  latanoprost 0.005% Ophthalmic Solution 1 Drop(s) Both EYES at bedtime  multivitamin 1 Tablet(s) Oral daily  nystatin Powder 1 Application(s) Topical two times a day  ofloxacin 0.3% Solution 1 Drop(s) Left EYE four times a day  senna 2 Tablet(s) Oral at bedtime  trimethoprim   80 mG/sulfamethoxazole 400 mG 1 Tablet(s) Oral two times a day    MEDICATIONS  (PRN):  acetaminophen     Tablet .. 650 milliGRAM(s) Oral every 6 hours PRN Temp greater or equal to 38C (100.4F), Mild Pain (1 - 3)  aluminum hydroxide/magnesium hydroxide/simethicone Suspension 30 milliLiter(s) Oral every 4 hours PRN Dyspepsia  melatonin 3 milliGRAM(s) Oral at bedtime PRN Insomnia  ondansetron Injectable 4 milliGRAM(s) IV Push every 8 hours PRN Nausea and/or Vomiting    PRESENT SYMPTOMS: [ ]Unable to self-report  [ ] CPOT [ ] PAINADs [ ] RDOS  Source if other than patient:  [ ]Family   [ ]Team     Pain: [ ]yes [x ]no  QOL impact -   Location -                    Aggravating factors -  Quality -  Radiation -  Timing-  Severity (0-10 scale):  Minimal acceptable level (0-10 scale):     CPOT:    https://www.sccm.org/getattachment/uzr49m57-1r5h-2f3f-7w6d-8552d0724k0v/Critical-Care-Pain-Observation-Tool-(CPOT)    PAINAD Score: See PAINAD tool and score below     Dyspnea:                           [ ]Mild [ ]Moderate [ ]Severe    RDOS: See RDOS tool and score below   0 to 2  minimal or no respiratory distress   3  mild distress  4 to 6 moderate distress  >7 severe distress      Anxiety:                             [ ]Mild [ ]Moderate [ ]Severe  Fatigue:                             [ ]Mild [ ]Moderate [ ]Severe  Nausea:                             [ ]Mild [ ]Moderate [ ]Severe  Loss of appetite:              [ ]Mild [ ]Moderate [ ]Severe  Constipation:                    [ ]Mild [ ]Moderate [ ]Severe    PCSSQ[Palliative Care Spiritual Screening Question]   Severity (0-10):  Score of 4 or > indicate consideration of Chaplaincy referral.  Chaplaincy Referral: [ ] yes [ ] refused [ ] following [ ] Deferred   8/14 Palliative  visited while doing rounds. Pt was sleeping.  provided care by [facility] & silent prayer.  left a visiting card offering emotional & spiritual support to pt & family.   will remain available.  Caregiver [place]? : [ ] yes [ ] no [x ] Deferred [ ] Declined             Social work referral [ ] Patient & Family Centered Care Referral [ ]     Anticipatory Grief present?:  [ ] yes [ ] no  [x ] Deferred                  Social work referral [ ] Chaplaincy Referral [ ]    		  Other Symptoms:  [x ]All other review of systems negative but as per HPI     Palliative Performance Status Version 2:   See PPSv2 tool and score below          PHYSICAL EXAM:  Vital Signs Last 24 Hrs  T(C): 36.4 (16 Aug 2023 11:49), Max: 37 (15 Aug 2023 21:23)  T(F): 97.5 (16 Aug 2023 11:49), Max: 98.6 (15 Aug 2023 21:23)  HR: 61 (16 Aug 2023 11:49) (61 - 72)  BP: 112/89 (16 Aug 2023 11:49) (112/89 - 138/71)  BP(mean): --  RR: 20 (16 Aug 2023 11:49) (18 - 20)  SpO2: 98% (16 Aug 2023 11:49) (97% - 98%)    Parameters below as of 16 Aug 2023 11:49  Patient On (Oxygen Delivery Method): room air      GENERAL: [ ]Cachexia    [x ]Alert  [x ]Oriented x 2-3   [ ]Lethargic  [ ]Unarousable  x ]Verbal  [ ]Non-Verbal  Behavioral:   [ ] Anxiety  [ ] Delirium [ ] Agitation [ ] Other  HEENT:  [ ]Normal   [ ]Dry mouth   [ ]ET Tube/Trach  [ ]Oral lesions [x] Large exophytic fungating mass, protruding from her left frontal region and pressing on her left eyelid.   PULMONARY:   [x ]Clear [ ]Tachypnea  [ ]Audible excessive secretions   [ ]Rhonchi        [ ]Right [ ]Left [ ]Bilateral  [ ]Crackles        [ ]Right [ ]Left [ ]Bilateral  [ ]Wheezing     [ ]Right [ ]Left [ ]Bilateral  [ ]Diminished breath sounds [ ]right [ ]left [ ]bilateral  CARDIOVASCULAR:    [x ]Regular [ ]Irregular [ ]Tachy  [ ]Albaro [ ]Murmur [ ]Other  GASTROINTESTINAL:  [x ]Soft  [ ]Distended   [x ]+BS  [ x]Non tender [ ]Tender  [ ]Other [ ]PEG [ ]OGT/ NGT  Last BM: 8/16  GENITOURINARY:  [x ]Normal [ ] Incontinent   [ ]Oliguria/Anuria   [ ]Drew  MUSCULOSKELETAL:   [ ]Normal   [ x]Weakness  [ ]Bed/Wheelchair bound [ ]Edema  NEUROLOGIC:   [ ]No focal deficits  [ ]Cognitive impairment  [ ]Dysphagia [ ]Dysarthria [ ]Paresis [x ]Other: Basic understanding about her illness and narrow capacity to discuss about her Rx options. Hence, needing support from her daughter to secure appropriate understanding and appreciation about her illness and treating options.   SKIN:   [ ]Normal  [ ]Rash  [ ]Other  [ ]Pressure ulcer(s)       Present on admission [ ]y [ ]n  [x] Large frontal mass as described above.     CRITICAL CARE:  [ ] Shock Present  [ ]Septic [ ]Cardiogenic [ ]Neurologic [ ]Hypovolemic  [ ]  Vasopressors [ ]  Inotropes   [ ]Respiratory failure present [ ]Mechanical ventilation [ ]Non-invasive ventilatory support [ ]High flow    [ ]Acute  [ ]Chronic [ ]Hypoxic  [ ]Hypercarbic [ ]Other  [ ]Other organ failure     LABS:                                     11.1   6.71  )-----------( 368      ( 16 Aug 2023 06:30 )             36.8   08-16    135  |  105  |  17  ----------------------------<  105<H>  4.9   |  17<L>  |  1.24    Ca    10.0      16 Aug 2023 06:31  Phos  3.0     08-16  Mg     2.3     08-16    RADIOLOGY & ADDITIONAL STUDIES:  < from: CT Maxillofacial w/ IV Cont (08.10.23 @ 20:10) >    IMPRESSION:  Moderate periventricular white matter ischemia.    7.5 x 2.4   x 7.0 cm dermal mass in the LEFT forehead and periorbital region   suspicious for a neoplasm.      --- End of Report ---            LIU LOU MD; Attending Radiologist  This document has been electronically signed. Aug 10 2023  8:44PM    < end of copied text >    PROTEIN CALORIE MALNUTRITION PRESENT: [ ]mild [ ]moderate [ ]severe [ ]underweight [ ]morbid obesity  https://www.andeal.org/vault/2440/web/files/ONC/Table_Clinical%20Characteristics%20to%20Document%20Malnutrition-White%20JV%20et%20al%202012.pdf    Height (cm): 160 (08-11-23 @ 00:37)  Weight (kg): 75.7 (08-11-23 @ 00:37)  BMI (kg/m2): 29.6 (08-11-23 @ 00:37)    [ ]PPSV2 < or = to 30% [ ]significant weight loss  [ ]poor nutritional intake  [ ]anasarca[ ]Artificial Nutrition      Other REFERRALS:  [ ]Hospice  [ ]Child Life  [ ]Social Work  [ ]Case management [ ]Holistic Therapy     Goals of Care Document:

## 2023-08-16 NOTE — PROGRESS NOTE ADULT - PROVIDER SPECIALTY LIST ADULT
Surgery
Surgery
Internal Medicine
Ophthalmology
Surgery
Infectious Disease
Internal Medicine
Palliative Care
Internal Medicine

## 2023-08-16 NOTE — PROGRESS NOTE ADULT - CONVERSATION DETAILS
As indicated above, the patient wanted me to discuss with her daughter about her condition and plan of care. I called the patient's daughter that gave verbalized understanding about Plastic Surgery inputs ( there are "... only 2 options would be resection under general anesthesia with possible reconstruction for wound coverage or no treatment and just observation"). Her daughter was frustrated that there was not a less invasive way to treat the patient's fungating lesion; however, accepted the actual inputs. I indicated that, as per Plastic Sx, a local excision was not possible due to risk of bleeding. Anyway, her daughter accepted the actual situation and indicated that, at this time, the goal was for taking the patient home with home care, so she was able to get PT, nursing, and wound care. We discussed about hospice and home care. I indicated that hospice care was an insurance benefit that provides supportive and symptomatic care, mainly at home, for patient's that have goals towards quality over quantity of life. Hospice includes a visiting nurse, durable medical equipment, medications for symptom Rx, a 24/7 call line for acute medical issues, and inpatient hospice care for those patients with intractable symptoms. I indicated that home care was a service that provides temporary support (usually less than 4 weeks) for patients that have illnesses that limit their functionality or capacity to participate in their medical care (e.g., need for wound care or IV Abx). It usually includes a visiting nurse that will see the patient depending on his/her needs; however, with limited availability. If a crisis were to present, under home care, 911 will need to be called. I indicated that this kind of care may be better for patients that want to have prolongation of life as a priority. If this were to be the goals (quantity of life over quality of life), understanding the patient's condition, coming back and forth into the hospital may be required. However, her daughter indicated she did not think hospice was ideal for the patient, at least at this time. She was concerned that if the patient were to hear the word hospice that she was going to think she was dying and that she was not ready for that. She wanted to "uplift" the patient and that hospice was not going to do that. She did ask about the possibility about getting hospice at home and I indicated that was completely doable.  The same idea came up when trying to discuss about code status. Her daughter believe the patient was kind of anxious and that trying to approach a conversation about resuscitation or even asking about a living  was not going to be productive. Her believed was that her mother was the kind of person that liked to bring things up, rather than bringing things up to here; therefore, she wanted to wait on having such discussion for when the patient were to be in a different state.

## 2023-08-16 NOTE — PROGRESS NOTE ADULT - PROBLEM SELECTOR PLAN 3
History of left leg DVT on Coumadin, unclear when diagnosed  Home regimen on Coumadin: 4 mg x 5 days weekly, 2 mg x 2 days weekly    Plan:  -per PCP, coumadin can be d/c  -DVT studies ordered, f/u results for persistence  -INR elevated, continue to trend History of left leg DVT on Coumadin, unclear when diagnosed  Home regimen on Coumadin: 4 mg x 5 days weekly, 2 mg x 2 days weekly    Plan:  -per PCP, coumadin can be d/c  -DVT studies 8/13 showed no evidence at this time -> No AC   -INR elevated, continue to trend

## 2023-08-16 NOTE — PROGRESS NOTE ADULT - PROBLEM SELECTOR PLAN 3
Will sign off.         Blake Watson MD  Associate Chief Geriatrics and Palliative Care (GaP) [facility]   GaP Consult Service   , [employer] [facility]      Please contact me via Teams from Monday through Friday between 9am-5pm. If not answering, please call the palliative care pager [phone]    After 5pm and on weekends, please see the contact information below:    In the event of newly developing, evolving, or worsening symptoms, please contact the Palliative Medicine team via pager (if the patient is at [facility] #[phone] or if the patient is at [facility] #[phone]) The Geriatric and Palliative Medicine service has coverage 24 hours a day/ 7 days a week to provide medical recommendations regarding symptom management needs via telephone

## 2023-08-16 NOTE — PROGRESS NOTE ADULT - PROBLEM SELECTOR PLAN 1
CT Head and Maxillofacial 8/11/23: 7.5 x 2.4 x 7.0 cm dermal mass in the left forehead and periorbital region suspicious for a neoplasm    Plan:  -optho following:            -no MRI at this as pt is unable to tolerate it  -gen surg and palliative care c/s: consider excision of mass but not under general anesthesia  -f/u with gen surg if plastics vs ENT would be c/s for this possible procedure CT Head and Maxillofacial 8/11/23: 7.5 x 2.4 x 7.0 cm dermal mass in the left forehead and periorbital region suspicious for a neoplasm    Plan:  -optho following:            -no MRI at this as pt is unable to tolerate it  -GOC discussion coordinated between pall, surg, plastics, surg onc: no inpt excision of mass due to patient's age and hypervascularity of location  -if pt/family desires to have mass removed, will need to purse surg onc biopsy first prior to surgical removal (which would also be conducted outpatient)

## 2023-08-16 NOTE — PROGRESS NOTE ADULT - PROBLEM SELECTOR PROBLEM 5
Macular degeneration

## 2023-08-22 RX ORDER — ATENOLOL 25 MG/1
1 TABLET ORAL
Qty: 30 | Refills: 0
Start: 2023-08-22 | End: 2023-09-20

## 2023-11-13 PROCEDURE — 83735 ASSAY OF MAGNESIUM: CPT

## 2023-11-13 PROCEDURE — 87070 CULTURE OTHR SPECIMN AEROBIC: CPT

## 2023-11-13 PROCEDURE — 87186 SC STD MICRODIL/AGAR DIL: CPT

## 2023-11-13 PROCEDURE — 84100 ASSAY OF PHOSPHORUS: CPT

## 2023-11-13 PROCEDURE — 36415 COLL VENOUS BLD VENIPUNCTURE: CPT

## 2023-11-13 PROCEDURE — 97162 PT EVAL MOD COMPLEX 30 MIN: CPT

## 2023-11-13 PROCEDURE — 85025 COMPLETE CBC W/AUTO DIFF WBC: CPT

## 2023-11-13 PROCEDURE — 80053 COMPREHEN METABOLIC PANEL: CPT

## 2023-11-13 PROCEDURE — 93970 EXTREMITY STUDY: CPT

## 2023-11-13 PROCEDURE — 87641 MR-STAPH DNA AMP PROBE: CPT

## 2023-11-13 PROCEDURE — 85610 PROTHROMBIN TIME: CPT

## 2023-11-13 PROCEDURE — 85027 COMPLETE CBC AUTOMATED: CPT

## 2023-11-13 PROCEDURE — 80048 BASIC METABOLIC PNL TOTAL CA: CPT

## 2023-11-13 PROCEDURE — 99285 EMERGENCY DEPT VISIT HI MDM: CPT

## 2023-11-13 PROCEDURE — 87077 CULTURE AEROBIC IDENTIFY: CPT

## 2023-11-13 PROCEDURE — 70487 CT MAXILLOFACIAL W/DYE: CPT | Mod: MA

## 2023-11-13 PROCEDURE — 70460 CT HEAD/BRAIN W/DYE: CPT | Mod: MA

## 2023-11-13 PROCEDURE — 87640 STAPH A DNA AMP PROBE: CPT

## 2023-11-13 PROCEDURE — 85730 THROMBOPLASTIN TIME PARTIAL: CPT

## 2024-04-02 NOTE — DISCHARGE NOTE PROVIDER - NSRESEARCHGRANT_MLMHIDDEN_GEN_A_CORE
FUTURE VISIT INFORMATION        SURGERY INFORMATION:  Date: 4/3/24  [facility]  Surgeon:  Milton Wolf MD   Anesthesia Type:  General with block  Procedure: TRABECULECTOMY, USING MITOMYCIN C RIGHT   Consult: ov 3/26/24     RECORDS REQUESTED FROM:            Most recent EKG+ Tracin23- [facility]                  yes

## 2024-05-03 NOTE — ED PROVIDER NOTE - CONTACT TIME
Patient reports of hernia bothering for a week. Patient called her primary. Primary sent the patient to the ER. Patient reports of urinary frequency, burning, lower abdominal, lower back.     HX: IBS  
01-Apr-2021 20:45

## 2024-08-30 NOTE — H&P PST ADULT - ABILITY TO HEAR (WITH HEARING AID OR HEARING APPLIANCE IF NORMALLY USED):
ambulatory
Mildly to Moderately Impaired: difficulty hearing in some environments or speaker may need to increase volume or speak distinctly/hearing aids

## 2024-09-22 NOTE — DISCHARGE NOTE NURSING/CASE MANAGEMENT/SOCIAL WORK - NSTOBACCONEVERSMOKERY/N_GEN_A
Patient presents to ER with complaints of elevated BG and states she has been unable to control sugars.   Patient notes fatigue.     
No
